# Patient Record
Sex: MALE | Race: WHITE | NOT HISPANIC OR LATINO
[De-identification: names, ages, dates, MRNs, and addresses within clinical notes are randomized per-mention and may not be internally consistent; named-entity substitution may affect disease eponyms.]

---

## 2024-03-23 ENCOUNTER — TRANSCRIPTION ENCOUNTER (OUTPATIENT)
Age: 74
End: 2024-03-23

## 2024-03-24 ENCOUNTER — INPATIENT (INPATIENT)
Facility: HOSPITAL | Age: 74
LOS: 11 days | Discharge: NURSING, REHAB OR LONG-TERM CARE | DRG: 23 | End: 2024-04-05
Attending: PSYCHIATRY & NEUROLOGY | Admitting: NEUROLOGICAL SURGERY
Payer: COMMERCIAL

## 2024-03-24 VITALS
WEIGHT: 147.49 LBS | RESPIRATION RATE: 18 BRPM | HEART RATE: 92 BPM | OXYGEN SATURATION: 98 % | DIASTOLIC BLOOD PRESSURE: 54 MMHG | SYSTOLIC BLOOD PRESSURE: 158 MMHG

## 2024-03-24 DIAGNOSIS — I10 ESSENTIAL (PRIMARY) HYPERTENSION: ICD-10-CM

## 2024-03-24 DIAGNOSIS — E78.5 HYPERLIPIDEMIA, UNSPECIFIED: ICD-10-CM

## 2024-03-24 DIAGNOSIS — I63.9 CEREBRAL INFARCTION, UNSPECIFIED: ICD-10-CM

## 2024-03-24 LAB
ALBUMIN SERPL ELPH-MCNC: 3.8 G/DL — SIGNIFICANT CHANGE UP (ref 3.3–5)
ALBUMIN SERPL ELPH-MCNC: 4.4 G/DL — SIGNIFICANT CHANGE UP (ref 3.3–5)
ALP SERPL-CCNC: 111 U/L — SIGNIFICANT CHANGE UP (ref 40–120)
ALP SERPL-CCNC: 92 U/L — SIGNIFICANT CHANGE UP (ref 40–120)
ALT FLD-CCNC: 10 U/L — SIGNIFICANT CHANGE UP (ref 10–45)
ALT FLD-CCNC: 13 U/L — SIGNIFICANT CHANGE UP (ref 10–45)
ANION GAP SERPL CALC-SCNC: 13 MMOL/L — SIGNIFICANT CHANGE UP (ref 5–17)
ANION GAP SERPL CALC-SCNC: 9 MMOL/L — SIGNIFICANT CHANGE UP (ref 5–17)
APTT BLD: 24.3 SEC — LOW (ref 24.5–35.6)
APTT BLD: 24.9 SEC — SIGNIFICANT CHANGE UP (ref 24.5–35.6)
AST SERPL-CCNC: 17 U/L — SIGNIFICANT CHANGE UP (ref 10–40)
AST SERPL-CCNC: 22 U/L — SIGNIFICANT CHANGE UP (ref 10–40)
BASOPHILS # BLD AUTO: 0.07 K/UL — SIGNIFICANT CHANGE UP (ref 0–0.2)
BASOPHILS NFR BLD AUTO: 1.1 % — SIGNIFICANT CHANGE UP (ref 0–2)
BILIRUB SERPL-MCNC: 0.7 MG/DL — SIGNIFICANT CHANGE UP (ref 0.2–1.2)
BILIRUB SERPL-MCNC: 0.8 MG/DL — SIGNIFICANT CHANGE UP (ref 0.2–1.2)
BUN SERPL-MCNC: 13 MG/DL — SIGNIFICANT CHANGE UP (ref 7–23)
BUN SERPL-MCNC: 9 MG/DL — SIGNIFICANT CHANGE UP (ref 7–23)
CALCIUM SERPL-MCNC: 8.1 MG/DL — LOW (ref 8.4–10.5)
CALCIUM SERPL-MCNC: 9.4 MG/DL — SIGNIFICANT CHANGE UP (ref 8.4–10.5)
CHLORIDE SERPL-SCNC: 105 MMOL/L — SIGNIFICANT CHANGE UP (ref 96–108)
CHLORIDE SERPL-SCNC: 106 MMOL/L — SIGNIFICANT CHANGE UP (ref 96–108)
CO2 SERPL-SCNC: 22 MMOL/L — SIGNIFICANT CHANGE UP (ref 22–31)
CO2 SERPL-SCNC: 22 MMOL/L — SIGNIFICANT CHANGE UP (ref 22–31)
CREAT SERPL-MCNC: 0.65 MG/DL — SIGNIFICANT CHANGE UP (ref 0.5–1.3)
CREAT SERPL-MCNC: 0.79 MG/DL — SIGNIFICANT CHANGE UP (ref 0.5–1.3)
EGFR: 100 ML/MIN/1.73M2 — SIGNIFICANT CHANGE UP
EGFR: 94 ML/MIN/1.73M2 — SIGNIFICANT CHANGE UP
EOSINOPHIL # BLD AUTO: 0.13 K/UL — SIGNIFICANT CHANGE UP (ref 0–0.5)
EOSINOPHIL NFR BLD AUTO: 2.1 % — SIGNIFICANT CHANGE UP (ref 0–6)
GLUCOSE BLDC GLUCOMTR-MCNC: 95 MG/DL — SIGNIFICANT CHANGE UP (ref 70–99)
GLUCOSE SERPL-MCNC: 104 MG/DL — HIGH (ref 70–99)
GLUCOSE SERPL-MCNC: 120 MG/DL — HIGH (ref 70–99)
HCT VFR BLD CALC: 35.9 % — LOW (ref 39–50)
HCT VFR BLD CALC: 41.8 % — SIGNIFICANT CHANGE UP (ref 39–50)
HGB BLD-MCNC: 12.3 G/DL — LOW (ref 13–17)
HGB BLD-MCNC: 14.2 G/DL — SIGNIFICANT CHANGE UP (ref 13–17)
IMM GRANULOCYTES NFR BLD AUTO: 0.3 % — SIGNIFICANT CHANGE UP (ref 0–0.9)
INR BLD: 0.98 — SIGNIFICANT CHANGE UP (ref 0.85–1.18)
INR BLD: 1.01 — SIGNIFICANT CHANGE UP (ref 0.85–1.18)
LYMPHOCYTES # BLD AUTO: 0.7 K/UL — LOW (ref 1–3.3)
LYMPHOCYTES # BLD AUTO: 11.4 % — LOW (ref 13–44)
MAGNESIUM SERPL-MCNC: 1.9 MG/DL — SIGNIFICANT CHANGE UP (ref 1.6–2.6)
MCHC RBC-ENTMCNC: 33.5 PG — SIGNIFICANT CHANGE UP (ref 27–34)
MCHC RBC-ENTMCNC: 34 GM/DL — SIGNIFICANT CHANGE UP (ref 32–36)
MCHC RBC-ENTMCNC: 34 PG — SIGNIFICANT CHANGE UP (ref 27–34)
MCHC RBC-ENTMCNC: 34.3 GM/DL — SIGNIFICANT CHANGE UP (ref 32–36)
MCV RBC AUTO: 98.6 FL — SIGNIFICANT CHANGE UP (ref 80–100)
MCV RBC AUTO: 99.2 FL — SIGNIFICANT CHANGE UP (ref 80–100)
MONOCYTES # BLD AUTO: 0.48 K/UL — SIGNIFICANT CHANGE UP (ref 0–0.9)
MONOCYTES NFR BLD AUTO: 7.8 % — SIGNIFICANT CHANGE UP (ref 2–14)
NEUTROPHILS # BLD AUTO: 4.72 K/UL — SIGNIFICANT CHANGE UP (ref 1.8–7.4)
NEUTROPHILS NFR BLD AUTO: 77.3 % — HIGH (ref 43–77)
NRBC # BLD: 0 /100 WBCS — SIGNIFICANT CHANGE UP (ref 0–0)
NRBC # BLD: 0 /100 WBCS — SIGNIFICANT CHANGE UP (ref 0–0)
PHOSPHATE SERPL-MCNC: 4.1 MG/DL — SIGNIFICANT CHANGE UP (ref 2.5–4.5)
PLATELET # BLD AUTO: 147 K/UL — LOW (ref 150–400)
PLATELET # BLD AUTO: 159 K/UL — SIGNIFICANT CHANGE UP (ref 150–400)
POTASSIUM SERPL-MCNC: 4.1 MMOL/L — SIGNIFICANT CHANGE UP (ref 3.5–5.3)
POTASSIUM SERPL-MCNC: 4.6 MMOL/L — SIGNIFICANT CHANGE UP (ref 3.5–5.3)
POTASSIUM SERPL-SCNC: 4.1 MMOL/L — SIGNIFICANT CHANGE UP (ref 3.5–5.3)
POTASSIUM SERPL-SCNC: 4.6 MMOL/L — SIGNIFICANT CHANGE UP (ref 3.5–5.3)
PROT SERPL-MCNC: 5.6 G/DL — LOW (ref 6–8.3)
PROT SERPL-MCNC: 6.9 G/DL — SIGNIFICANT CHANGE UP (ref 6–8.3)
PROTHROM AB SERPL-ACNC: 11.2 SEC — SIGNIFICANT CHANGE UP (ref 9.5–13)
PROTHROM AB SERPL-ACNC: 11.5 SEC — SIGNIFICANT CHANGE UP (ref 9.5–13)
RBC # BLD: 3.62 M/UL — LOW (ref 4.2–5.8)
RBC # BLD: 4.24 M/UL — SIGNIFICANT CHANGE UP (ref 4.2–5.8)
RBC # FLD: 12.7 % — SIGNIFICANT CHANGE UP (ref 10.3–14.5)
RBC # FLD: 12.7 % — SIGNIFICANT CHANGE UP (ref 10.3–14.5)
SODIUM SERPL-SCNC: 136 MMOL/L — SIGNIFICANT CHANGE UP (ref 135–145)
SODIUM SERPL-SCNC: 141 MMOL/L — SIGNIFICANT CHANGE UP (ref 135–145)
WBC # BLD: 5.8 K/UL — SIGNIFICANT CHANGE UP (ref 3.8–10.5)
WBC # BLD: 6.12 K/UL — SIGNIFICANT CHANGE UP (ref 3.8–10.5)
WBC # FLD AUTO: 5.8 K/UL — SIGNIFICANT CHANGE UP (ref 3.8–10.5)
WBC # FLD AUTO: 6.12 K/UL — SIGNIFICANT CHANGE UP (ref 3.8–10.5)

## 2024-03-24 PROCEDURE — 99291 CRITICAL CARE FIRST HOUR: CPT

## 2024-03-24 PROCEDURE — 70450 CT HEAD/BRAIN W/O DYE: CPT | Mod: 26,MC,59

## 2024-03-24 PROCEDURE — 0042T: CPT | Mod: MC

## 2024-03-24 PROCEDURE — 70496 CT ANGIOGRAPHY HEAD: CPT | Mod: 26,MC

## 2024-03-24 PROCEDURE — 70498 CT ANGIOGRAPHY NECK: CPT | Mod: 26,MC

## 2024-03-24 RX ORDER — POLYETHYLENE GLYCOL 3350 17 G/17G
17 POWDER, FOR SOLUTION ORAL DAILY
Refills: 0 | Status: DISCONTINUED | OUTPATIENT
Start: 2024-03-24 | End: 2024-04-05

## 2024-03-24 RX ORDER — FINASTERIDE 5 MG/1
1 TABLET, FILM COATED ORAL
Refills: 0 | DISCHARGE

## 2024-03-24 RX ORDER — SODIUM CHLORIDE 9 MG/ML
10 INJECTION INTRAMUSCULAR; INTRAVENOUS; SUBCUTANEOUS ONCE
Refills: 0 | Status: COMPLETED | OUTPATIENT
Start: 2024-03-24 | End: 2024-03-24

## 2024-03-24 RX ORDER — HYDROMORPHONE HYDROCHLORIDE 2 MG/ML
0.25 INJECTION INTRAMUSCULAR; INTRAVENOUS; SUBCUTANEOUS ONCE
Refills: 0 | Status: DISCONTINUED | OUTPATIENT
Start: 2024-03-24 | End: 2024-03-24

## 2024-03-24 RX ORDER — TRAMADOL HYDROCHLORIDE 50 MG/1
25 TABLET ORAL EVERY 6 HOURS
Refills: 0 | Status: DISCONTINUED | OUTPATIENT
Start: 2024-03-24 | End: 2024-03-26

## 2024-03-24 RX ORDER — ONDANSETRON 8 MG/1
4 TABLET, FILM COATED ORAL EVERY 8 HOURS
Refills: 0 | Status: DISCONTINUED | OUTPATIENT
Start: 2024-03-24 | End: 2024-04-05

## 2024-03-24 RX ORDER — ACETAMINOPHEN 500 MG
1000 TABLET ORAL ONCE
Refills: 0 | Status: COMPLETED | OUTPATIENT
Start: 2024-03-24 | End: 2024-03-24

## 2024-03-24 RX ORDER — TENECTEPLASE 50 MG
17 KIT INTRAVENOUS ONCE
Refills: 0 | Status: COMPLETED | OUTPATIENT
Start: 2024-03-24 | End: 2024-03-24

## 2024-03-24 RX ORDER — INSULIN LISPRO 100/ML
VIAL (ML) SUBCUTANEOUS
Refills: 0 | Status: DISCONTINUED | OUTPATIENT
Start: 2024-03-24 | End: 2024-03-26

## 2024-03-24 RX ORDER — SODIUM CHLORIDE 9 MG/ML
1000 INJECTION INTRAMUSCULAR; INTRAVENOUS; SUBCUTANEOUS ONCE
Refills: 0 | Status: COMPLETED | OUTPATIENT
Start: 2024-03-24 | End: 2024-03-24

## 2024-03-24 RX ORDER — SODIUM CHLORIDE 9 MG/ML
1000 INJECTION INTRAMUSCULAR; INTRAVENOUS; SUBCUTANEOUS
Refills: 0 | Status: DISCONTINUED | OUTPATIENT
Start: 2024-03-24 | End: 2024-03-25

## 2024-03-24 RX ORDER — TAMSULOSIN HYDROCHLORIDE 0.4 MG/1
0.4 CAPSULE ORAL AT BEDTIME
Refills: 0 | Status: DISCONTINUED | OUTPATIENT
Start: 2024-03-24 | End: 2024-04-05

## 2024-03-24 RX ORDER — CHLORHEXIDINE GLUCONATE 213 G/1000ML
1 SOLUTION TOPICAL DAILY
Refills: 0 | Status: DISCONTINUED | OUTPATIENT
Start: 2024-03-24 | End: 2024-03-25

## 2024-03-24 RX ORDER — TAMSULOSIN HYDROCHLORIDE 0.4 MG/1
1 CAPSULE ORAL
Refills: 0 | DISCHARGE

## 2024-03-24 RX ORDER — ACETAMINOPHEN 500 MG
650 TABLET ORAL EVERY 6 HOURS
Refills: 0 | Status: DISCONTINUED | OUTPATIENT
Start: 2024-03-24 | End: 2024-04-05

## 2024-03-24 RX ORDER — SENNA PLUS 8.6 MG/1
2 TABLET ORAL AT BEDTIME
Refills: 0 | Status: DISCONTINUED | OUTPATIENT
Start: 2024-03-24 | End: 2024-03-31

## 2024-03-24 RX ORDER — FINASTERIDE 5 MG/1
5 TABLET, FILM COATED ORAL DAILY
Refills: 0 | Status: DISCONTINUED | OUTPATIENT
Start: 2024-03-24 | End: 2024-04-05

## 2024-03-24 RX ORDER — ATORVASTATIN CALCIUM 80 MG/1
80 TABLET, FILM COATED ORAL AT BEDTIME
Refills: 0 | Status: DISCONTINUED | OUTPATIENT
Start: 2024-03-24 | End: 2024-03-28

## 2024-03-24 RX ORDER — TRAMADOL HYDROCHLORIDE 50 MG/1
50 TABLET ORAL EVERY 6 HOURS
Refills: 0 | Status: DISCONTINUED | OUTPATIENT
Start: 2024-03-24 | End: 2024-03-26

## 2024-03-24 RX ADMIN — SODIUM CHLORIDE 1000 MILLILITER(S): 9 INJECTION INTRAMUSCULAR; INTRAVENOUS; SUBCUTANEOUS at 20:59

## 2024-03-24 RX ADMIN — ONDANSETRON 4 MILLIGRAM(S): 8 TABLET, FILM COATED ORAL at 22:21

## 2024-03-24 RX ADMIN — TENECTEPLASE 2448 MILLIGRAM(S): KIT at 08:46

## 2024-03-24 RX ADMIN — Medication 1000 MILLIGRAM(S): at 14:16

## 2024-03-24 RX ADMIN — HYDROMORPHONE HYDROCHLORIDE 0.25 MILLIGRAM(S): 2 INJECTION INTRAMUSCULAR; INTRAVENOUS; SUBCUTANEOUS at 16:00

## 2024-03-24 RX ADMIN — HYDROMORPHONE HYDROCHLORIDE 0.25 MILLIGRAM(S): 2 INJECTION INTRAMUSCULAR; INTRAVENOUS; SUBCUTANEOUS at 16:59

## 2024-03-24 RX ADMIN — ATORVASTATIN CALCIUM 80 MILLIGRAM(S): 80 TABLET, FILM COATED ORAL at 21:58

## 2024-03-24 RX ADMIN — TRAMADOL HYDROCHLORIDE 50 MILLIGRAM(S): 50 TABLET ORAL at 21:03

## 2024-03-24 RX ADMIN — HYDROMORPHONE HYDROCHLORIDE 0.25 MILLIGRAM(S): 2 INJECTION INTRAMUSCULAR; INTRAVENOUS; SUBCUTANEOUS at 17:30

## 2024-03-24 RX ADMIN — SENNA PLUS 2 TABLET(S): 8.6 TABLET ORAL at 21:58

## 2024-03-24 RX ADMIN — SODIUM CHLORIDE 10 MILLILITER(S): 9 INJECTION INTRAMUSCULAR; INTRAVENOUS; SUBCUTANEOUS at 08:45

## 2024-03-24 RX ADMIN — SODIUM CHLORIDE 10 MILLILITER(S): 9 INJECTION INTRAMUSCULAR; INTRAVENOUS; SUBCUTANEOUS at 08:46

## 2024-03-24 RX ADMIN — TAMSULOSIN HYDROCHLORIDE 0.4 MILLIGRAM(S): 0.4 CAPSULE ORAL at 21:58

## 2024-03-24 RX ADMIN — Medication 400 MILLIGRAM(S): at 13:28

## 2024-03-24 RX ADMIN — TRAMADOL HYDROCHLORIDE 50 MILLIGRAM(S): 50 TABLET ORAL at 20:59

## 2024-03-24 RX ADMIN — HYDROMORPHONE HYDROCHLORIDE 0.25 MILLIGRAM(S): 2 INJECTION INTRAMUSCULAR; INTRAVENOUS; SUBCUTANEOUS at 15:10

## 2024-03-24 NOTE — PROGRESS NOTE ADULT - SUBJECTIVE AND OBJECTIVE BOX
NEUROSURGERY POST OP NOTE:    POD# 0 S/P cerebral angiogram with thrombectomy of right M1/M2, TICI 0 to 2B    S: Patient reports headache. Denies groin pain, numbness, tingling, nausea, vomiting, chest pain, palpitations, shortness of breath, vision changes.     T(C): 36.5 (03-24-24 @ 14:08), Max: 36.5 (03-24-24 @ 14:08)  HR: 73 (03-24-24 @ 14:00) (71 - 94)  BP: 120/73 (03-24-24 @ 14:00) (115/74 - 158/54)  RR: 22 (03-24-24 @ 14:00) (11 - 22)  SpO2: 100% (03-24-24 @ 14:00) (98% - 100%)    acetaminophen     Tablet .. 650 milliGRAM(s) Oral every 6 hours PRN  atorvastatin 80 milliGRAM(s) Oral at bedtime  chlorhexidine 2% Cloths 1 Application(s) Topical daily  polyethylene glycol 3350 17 Gram(s) Oral daily  senna 2 Tablet(s) Oral at bedtime  sodium chloride 0.9%. 1000 milliLiter(s) IV Continuous <Continuous>    RADIOLOGY:     Exam:  Constitutional: 74 y/o maleawake, alert in no acute distress.  Eyes: Sclera anicteric, conjunctiva noninjected.   ENMT: Oropharyngeal mucosa moist, pink. Tongue midline.    Respiratory: Chest rise symmetric, unlabored breathing. + NC  Cardiovascular: Regular rate and rhythm.   Gastrointestinal: Soft, nontender, nondistended.   Vascular: Extremities warm, no ulcers, no discoloration of skin. DP pulses 1+ b/l (easily to doppler), PT 2+ b/l   Neurological: AA&O x 2. L facial droop, mild left neglect, otherwise CN II-XII grossly intact. SANTANA x 4, RUE 5/5, RLE deferred d/t groin puncture. LUE/LLE 4/5. Sensation intact to light touch throughout. No pronator drift, no dysmetria.  Skin: Warm, dry, no erythema.  Incision: Right groin puncture site c/d/i with gauze, safeguard deflated.     Assessment:   74 y/o male with h/o HTN, BPH, HLD, mitral valve repair presents to ED with left sided weakness, L facial droop, and L gaze preference, s/p TNK (3/24/24). Stroke code imaging demonstrates right M1 occlusion, now s/p thrombectomy R M1/2, TICI 0 to 2B (3/24/24).     NEURO  - Neuro/vitals q 1   - CTH 3/24/24 9 AM   - Bedrest x 8 hrs   - Pain control   - Stroke/neuro following, stroke core measures ordered     CARDIO  - -160   - Echo with bubble   - HTN: hold home Lisinopril 5 mg     PULM  - RA, encourage IS    GI   - ADAT after 3 PM   - Bowel regimen     RENAL   - NS @ 75   - Voiding     ENDO   - ISS  - Follow up A1c, TSH, Lipid panel   - HLD: Increase home losartan to 80 mg QHS i/s/o stroke     HEME   - SCDs, hold SQL in setting of TNK    ID   - afebrile     DISPO  - ICU status, full code   - Hold PT/OT     D/W Dr. Rubio and Dr. Cast

## 2024-03-24 NOTE — BRIEF OPERATIVE NOTE - NSICDXBRIEFPREOP_GEN_ALL_CORE_FT
PRE-OP DIAGNOSIS:  CVA (cerebrovascular accident) 24-Mar-2024 12:40:12 Right MCA Occlusion TICI 0 Sonny Suresh

## 2024-03-24 NOTE — ED ADULT TRIAGE NOTE - CHIEF COMPLAINT QUOTE
Pt to ED notification for slurred speech and left sided weakness. Notification called and stroke code activated. Last known well 7:45.

## 2024-03-24 NOTE — H&P ADULT - NSICDXPASTMEDICALHX_GEN_ALL_CORE_FT
PAST MEDICAL HISTORY:  CVA (cerebrovascular accident)     History of BPH     Hyperlipidemia     Hypertension

## 2024-03-24 NOTE — H&P ADULT - NSHPPHYSICALEXAM_GEN_ALL_CORE
Exam  General: No acute distress, relatively compfortable and cooperative with exam  HEENT: NC/AT, PERR, neck supple, no JVD  Neuro:  -Mental status: Awake, alert, oriented to person, age, and month. Speech is fluent with intact naming,  Follows commands. Attention/concentration intact.   -Cranial nerves:   II: No blink to threat of left visual field  III, IV, VI: Right gaze preference unable to overcome. Pupils equally round and reactive to light  VII: Left facial droop  Motor: Normal bulk and tone. Right side 5/5. Left arm 0/5, Left leg able to lift against gravity but immediately falls, 3/5  Sensation: Left hemineglect and sensation loss  Coordination: No dysmetria on finger-to-nose of right side   CVS: S1-S@, No RRR  Lungs: Grossly CTA  Abd: Soft, ND  Ext: No C/C/E,   Pulses: B/L DP 2+  Skin: No rashes, no lesions noted

## 2024-03-24 NOTE — ED PROVIDER NOTE - OBJECTIVE STATEMENT
73 year old M w h/o HTN presenting with L sided paralysis, R gaze preference, dysarthria, last known well around 7:30 AM. Pt went for a run this AM, came home and felt off in bathroom, leaned towards glass door around 7:30 AM. Slightly hit his head on glass door of shower. Mild headache. Not on AC. ROS as above.

## 2024-03-24 NOTE — H&P ADULT - NSHPLABSRESULTS_GEN_ALL_CORE
14.2   6.12  )-----------( 159      ( 24 Mar 2024 08:22 )             41.8   PT/INR - ( 24 Mar 2024 08:22 )   PT: 11.2 sec;   INR: 0.98          PTT - ( 24 Mar 2024 08:22 )  PTT:24.3 sec  03-24-24 @ 08:22    141  |  106  |  13             --------------------------< 120<H>     4.6  |  22  | 0.79                      Calcium: 9.4 -  AST: 22    ALT: 13  AlkPhos: 111  Protein: 6.9  Albumin: 4.4  TBili: 0.7  < from: CT Angio Neck Stroke Protocol w/ IV Cont (03.24.24 @ 08:55) >    IMPRESSION:    CT PERFUSION: Small core infarct, large ischemic penumbra in the right   MCA territory.    CTA COW: Abrupt right M1 occlusion with M2 reconstitution.    CTA NECK: Patent, ECAs, ICAs, no  hemodynamically significant stenosis at    ICA origins by NASCET criteria.  Bilateral vertebral arteries are patent without flow limiting stenosis.    --- End of Report ---    ESME RODRIGUEZ MD; Attending Radiologist  This document has been electronically signed. Mar 24 2024  9:09AM    < end of copied text >

## 2024-03-24 NOTE — H&P ADULT - PROBLEM SELECTOR PLAN 1
Mechanical Thrombectomy using Penumbra aspiration and stent retriever.  Admit to NSICU  Neuro, Vitals, puncture site and pulses checks  SBP less than 160  Bed rest for no less than 8 hrs  Repeat non contrast HCT 24 hrs post TNK, or sooner if MS change  HOB Flat x 4 hrs, slow Up to 15 degrees if needed  NPO  Stroke core measures

## 2024-03-24 NOTE — ED ADULT NURSE NOTE - NSFALLRISKINTERV_ED_ALL_ED
Assistance OOB with selected safe patient handling equipment if applicable/Assistance with ambulation/Communicate fall risk and risk factors to all staff, patient, and family/Monitor gait and stability/Provide visual cue: yellow wristband, yellow gown, etc/Reinforce activity limits and safety measures with patient and family/Call bell, personal items and telephone in reach/Instruct patient to call for assistance before getting out of bed/chair/stretcher/Non-slip footwear applied when patient is off stretcher/[place] to call system/Physically safe environment - no spills, clutter or unnecessary equipment/Purposeful Proactive Rounding/Room/bathroom lighting operational, light cord in reach

## 2024-03-24 NOTE — ED PROVIDER NOTE - PHYSICAL EXAMINATION
general: Well appearing, in no acute distress  HEENT: Normocephalic, atraumatic, extraocular movements intact  CV: Regular rate  Pulm: No respiratory distress, no tachypnea  Abd: Flat, no gross distension  Ext: warm and well perfused  Skin: No gross rashes or lesions  Neuro: Alert and oriented,  left-sided paralysis, left facial droop, right gaze press preference, dysarthria

## 2024-03-24 NOTE — PROGRESS NOTE ADULT - SUBJECTIVE AND OBJECTIVE BOX
NSCU ATTENDING -- ADDITIONAL PROGRESS NOTE    Nighttime rounds were performed    74 y/o M with HTN, BPH, HLD, coming in with left sided weakness. Pt states that he woke up at 5 am and went for a run. He came back and went to shower. He said he slipped and hit his head on the shower door. Wife states that he heard a thud from the shower around 7:45AM. She went in and noticed his left side was weak and his head was against the shower door. He had not fallen to the ground. She called her daughter who came by, and they also called EMS. Pt states that he slipped and fell, does not think he has any actual weakness. He wonders how his dog is doing. 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 NIHSS 17. Pt states he has a headache, 9/10 but says it isn't that bad and does not need medication for the headache. Case discussed with Dr. Narinder Sherwood who discussed with Dr. Schreiber prior to urgent intervention. Risk and benefits of tenecteplase were discussed with patient/family member including risk of symptomatic ICH/death. Decision was made that benefits outweighed risks and tenecteplase was administered. Thrombectomy was discussed between Neurology and Neuro IR attendings and decision was made to proceed with thrombectomy. Tenecteplase given at 8:46AM.  Pt was seen about 30 minutes after TNK was given, NIHSS improved to 15. Headache similar to how it was prior to tenecteplase administration.  (24 Mar 2024 12:17)      ICU Vital Signs Last 24 Hrs  T(C): 37.7 (24 Mar 2024 18:11), Max: 37.7 (24 Mar 2024 18:11)  T(F): 99.9 (24 Mar 2024 18:11), Max: 99.9 (24 Mar 2024 18:11)  HR: 64 (24 Mar 2024 18:00) (64 - 94)  BP: 116/70 (24 Mar 2024 18:00) (101/63 - 158/54)  BP(mean): 88 (24 Mar 2024 18:00) (76 - 90)  RR: 18 (24 Mar 2024 18:00) (11 - 22)  SpO2: 95% (24 Mar 2024 18:00) (95% - 100%)      PHYSICAL EXAMINATION  NEUROLOGIC EXAMINATION:   Patient is lethargic, easily arousable, oriented x2, dysarthric, pupils 2-3mm equal and briskly reactive to light, no gaze preference  Power:  RUE, RLE 5/5, LUE, LLE 4/5  GENERAL: Calm  EENT: Anicteric  CARDIOVASCULAR: S1/S2, RRR  PULMONARY: Clear to auscultation bilaterally  ABDOMEN: Soft, nontender with normoactive bowel sounds  EXTREMITIES: No edema  SKIN: No rash      MEDICATIONS:  acetaminophen     Tablet .. 650 milliGRAM(s) Oral every 6 hours PRN  atorvastatin 80 milliGRAM(s) Oral at bedtime  chlorhexidine 2% Cloths 1 Application(s) Topical daily  finasteride 5 milliGRAM(s) Oral daily  insulin lispro (ADMELOG) corrective regimen sliding scale   SubCutaneous Before meals and at bedtime  polyethylene glycol 3350 17 Gram(s) Oral daily  senna 2 Tablet(s) Oral at bedtime  sodium chloride 0.9%. 1000 milliLiter(s) (75 mL/Hr) IV Continuous <Continuous>  tamsulosin 0.4 milliGRAM(s) Oral at bedtime  traMADol 25 milliGRAM(s) Oral every 6 hours PRN  traMADol 50 milliGRAM(s) Oral every 6 hours PRN      LABS:                    12.3   5.80  )-----------( 147      ( 24 Mar 2024 16:28 )             35.9     03-24    136  |  105  |  9   ----------------------------<  104<H>  4.1   |  22  |  0.65    Ca    8.1<L>      24 Mar 2024 16:28  Phos  4.1     03-24  Mg     1.9     03-24    TPro  5.6<L>  /  Alb  3.8  /  TBili  0.8  /  DBili  x   /  AST  17  /  ALT  10  /  AlkPhos  92  03-24    LIVER FUNCTIONS - ( 24 Mar 2024 16:28 )  Alb: 3.8 g/dL / Pro: 5.6 g/dL / ALK PHOS: 92 U/L / ALT: 10 U/L / AST: 17 U/L / GGT: x               ASSESSMENT:  74 y/o M with:  Acute CVA R M1 occlusion, s/p TNK, s/p thrombectomy (03/24/2024, Dr. Sage)  Hypertension dyslipidemia  BPH    PLAN:   Neurochecks Q1h, PRN pain meds with acetaminophen  Hold antiplatelets, anticoagulation 24 hours post TNK  CT head 24 hours post TNK  Stroke core measures  Stroke neurology consult  Room air, incentive spirometry  SBP goal 120-160mm Hg, echo with bubble study  Blood sugar goals 140-180 mg/dL, continue insulin sliding scale  Bowel regimen  LBM:  Advance as tolerated after 6 hours post-TNK  IVF while NPO. Continue tamsulosin, finasteride  Hb stable  VTE Prophylaxis: SCDs, no DVT chemoprophylaxis for now as patient is high risk for bleed (post-TNK)  Afebrile, no leukocytosis    Additional 45 minutes of critical care time.     NSCU ATTENDING -- ADDITIONAL PROGRESS NOTE    Nighttime rounds were performed    74 y/o M with HTN, BPH, HLD, coming in with left sided weakness. Pt states that he woke up at 5 am and went for a run. He came back and went to shower. He said he slipped and hit his head on the shower door. Wife states that he heard a thud from the shower around 7:45AM. She went in and noticed his left side was weak and his head was against the shower door. He had not fallen to the ground. She called her daughter who came by, and they also called EMS. Pt states that he slipped and fell, does not think he has any actual weakness. He wonders how his dog is doing. 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 NIHSS 17. Pt states he has a headache, 9/10 but says it isn't that bad and does not need medication for the headache. Case discussed with Dr. Narinder Sherwood who discussed with Dr. Schreiber prior to urgent intervention. Risk and benefits of tenecteplase were discussed with patient/family member including risk of symptomatic ICH/death. Decision was made that benefits outweighed risks and tenecteplase was administered. Thrombectomy was discussed between Neurology and Neuro IR attendings and decision was made to proceed with thrombectomy. Tenecteplase given at 8:46AM.  Pt was seen about 30 minutes after TNK was given, NIHSS improved to 15. Headache similar to how it was prior to tenecteplase administration.  (24 Mar 2024 12:17)      ICU Vital Signs Last 24 Hrs  T(C): 37.7 (24 Mar 2024 18:11), Max: 37.7 (24 Mar 2024 18:11)  T(F): 99.9 (24 Mar 2024 18:11), Max: 99.9 (24 Mar 2024 18:11)  HR: 64 (24 Mar 2024 18:00) (64 - 94)  BP: 116/70 (24 Mar 2024 18:00) (101/63 - 158/54)  BP(mean): 88 (24 Mar 2024 18:00) (76 - 90)  RR: 18 (24 Mar 2024 18:00) (11 - 22)  SpO2: 95% (24 Mar 2024 18:00) (95% - 100%)      PHYSICAL EXAMINATION  NEUROLOGIC EXAMINATION:   Patient is lethargic, easily arousable, oriented x2, dysarthric, pupils 2-3mm equal and briskly reactive to light, no gaze preference  Power:  RUE, RLE 5/5, LUE, LLE 4/5  GENERAL: Calm  EENT: Anicteric  CARDIOVASCULAR: S1/S2, RRR  PULMONARY: Clear to auscultation bilaterally  ABDOMEN: Soft, nontender with normoactive bowel sounds  EXTREMITIES: No edema  SKIN: No rash      MEDICATIONS:  acetaminophen     Tablet .. 650 milliGRAM(s) Oral every 6 hours PRN  atorvastatin 80 milliGRAM(s) Oral at bedtime  chlorhexidine 2% Cloths 1 Application(s) Topical daily  finasteride 5 milliGRAM(s) Oral daily  insulin lispro (ADMELOG) corrective regimen sliding scale   SubCutaneous Before meals and at bedtime  polyethylene glycol 3350 17 Gram(s) Oral daily  senna 2 Tablet(s) Oral at bedtime  sodium chloride 0.9%. 1000 milliLiter(s) (75 mL/Hr) IV Continuous <Continuous>  tamsulosin 0.4 milliGRAM(s) Oral at bedtime  traMADol 25 milliGRAM(s) Oral every 6 hours PRN  traMADol 50 milliGRAM(s) Oral every 6 hours PRN      LABS:                    12.3   5.80  )-----------( 147      ( 24 Mar 2024 16:28 )             35.9     03-24    136  |  105  |  9   ----------------------------<  104<H>  4.1   |  22  |  0.65    Ca    8.1<L>      24 Mar 2024 16:28  Phos  4.1     03-24  Mg     1.9     03-24    TPro  5.6<L>  /  Alb  3.8  /  TBili  0.8  /  DBili  x   /  AST  17  /  ALT  10  /  AlkPhos  92  03-24    LIVER FUNCTIONS - ( 24 Mar 2024 16:28 )  Alb: 3.8 g/dL / Pro: 5.6 g/dL / ALK PHOS: 92 U/L / ALT: 10 U/L / AST: 17 U/L / GGT: x               ASSESSMENT:  74 y/o M with:  Acute CVA R M1 occlusion, s/p TNK, s/p thrombectomy (03/24/2024, Dr. Sage)  Hypertension dyslipidemia  BPH    PLAN:   Neurochecks Q1h, PRN pain meds with acetaminophen  Hold antiplatelets, anticoagulation 24 hours post TNK  CT head 24 hours post TNK  Stroke core measures  Stroke neurology consult  Room air, incentive spirometry  SBP goal 120-160mm Hg, TTE with bubble study  Blood sugar goals 140-180 mg/dL, continue insulin sliding scale  Bowel regimen  LBM: awaiting  Advance as tolerated after 6 hours post-TNK  IVF while NPO. Continue tamsulosin, finasteride  Hb stable  VTE Prophylaxis: SCDs, no DVT chemoprophylaxis for now as patient is high risk for bleed (post-TNK)  Afebrile, no leukocytosis    Additional 45 minutes of critical care time.

## 2024-03-24 NOTE — PROGRESS NOTE ADULT - SUBJECTIVE AND OBJECTIVE BOX
=================================  NEUROCRITICAL CARE ATTENDING NOTE  =================================    CARO CHAVARRIA   MRN-[id number]  Summary:  73y/M with HTN, BPH, HLD, coming in with left sided weakness. Pt states that he woke up at 5 am and went for a run. He came back and went to shower. He said he slipped and hit his head on the shower door. Wife states that he heard a thud from the shower around 7:45AM. She went in and noticed his left side was weak and his head was against the shower door. He had not fallen to the ground. She called her daughter who came by, and they also called EMS. Pt states that he slipped and fell, does not think he has any actual weakness. He wonders how his dog is doing. 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 NIHSS 17. Pt states he has a headache, 9/10 but says it isn't that bad and does not need medication for the headache. Case discussed with Dr. Narinder Sherwood who discussed with Dr. Schreiber prior to urgent intervention. Risk and benefits of tenecteplase were discussed with patient/family member including risk of symptomatic ICH/death. Decision was made that benefits outweighed risks and tenecteplase was administered. Thrombectomy was discussed between Neurology and Neuro IR Attendings and decision was made to proceed with thrombectomy. Tenecteplase given at 8:46AM.  Pt was seen about 30 minutes after TNK was given, NIHSS improved to 15. Headache similar to how it was prior to tenecteplase administration.  (24 Mar 2024 12:17)    COURSE IN THE HOSPITAL:  03/24 admitted to [facility], s/p TNK at ~9 a.m., s/p thrombectomy 6 passes, TICI 0 to 2B    Past Medical History: Hypertension History of BPH CVA (cerebrovascular accident) Hyperlipidemia   Allergies:  Allergy Status Unknown  Home meds:   ·	atorvastatin 40 mg oral tablet: 1 tab(s) orally once a day  ·	finasteride 5 mg oral tablet: 1 tab(s) orally once a day  ·	Flomax 0.4 mg oral capsule: 1 cap(s) orally once a day  ·	lisinopril 5 mg oral tablet: 1 tab(s) orally    PHYSICAL EXAMINATION  T(C): 36.5 (03-24 @ 14:08), Max: 36.5 (03-24 @ 14:08) HR: 81 (03-24 @ 14:45) (71 - 94) BP: 118/74 (03-24 @ 14:45) (115/74 - 158/54) RR: 16 (03-24 @ 14:45) (11 - 22) SpO2: 99% (03-24 @ 14:45) (98% - 100%)  NEUROLOGIC EXAMINATION:  Patient is lethargic, easily rousable, oriented x2, dysarthric, pupils 2-3mm equal and briskly reactive to light, no gaze preference, moving R UE/LE with good strength L UE/LE 4/5  GENERAL: not intubated, not in cardiorespiratory distress  EENT:  anicteric  CARDIOVASCULAR: (+) S1 S2, normal rate and regular rhythm  PULMONARY: clear to auscultation bilaterally  ABDOMEN: soft, nontender with normoactive bowel sounds  EXTREMITIES: no edema  SKIN: no rash    LABS:  CAPILLARY BLOOD GLUCOSE 106 (24 Mar 2024 08:48)               14.2   6.12  )-----------( 159      ( 24 Mar 2024 08:22 )             41.8     141  |  106  |  13  ----------------------------<  120<H>  4.6   |  22  |  0.79    Ca    9.4      24 Mar 2024 08:22    TPro  6.9  /  Alb  4.4  /  TBili  0.7  /  DBili  x   /  AST  22  /  ALT  13  /  AlkPhos  111  03-24    Bacteriology:  CSF studies:  EEG:  Neuroimaging:  Other imaging:    MEDICATIONS: 03-24    ·	polyethylene glycol 3350 17 Oral daily  ·	senna 2 Oral at bedtime  ·	atorvastatin 80 Oral at bedtime  ·	finasteride 5 Oral daily  ·	tamsulosin 0.4 Oral at bedtime  ·	acetaminophen     Tablet .. 650 Oral every 6 hours PRN    IV FLUIDS:  DRIPS:  DIET:  Lines:  Drains:    Wounds:    CODE STATUS:  Full Code                       GOALS OF CARE:  aggressive                      DISPOSITION:  ICU

## 2024-03-24 NOTE — PROGRESS NOTE ADULT - ASSESSMENT
73y/M with  acute CVA R M1 occlusion, s/p TNK, s/p thrombectomy (03/24/2024, Dr. Sage)  Hypertension dyslipidemia  h/o BPH    PLAN:   NEURO: neurochecks q1h, PRN pain meds with acetaminophen  stroke core measures  stroke team consulted  CT at 9 a.m. tomorrow  groin checks  REHAB:  physical therapy evaluation and management    EARLY MOB:  bedrest    PULM:  Room air, incentive spirometry  CARDIO:  SBP goal 120-160mm Hg, echo with bubble study  ENDO:  Blood sugar goals 140-180 mg/dL, continue insulin sliding scale  GI:  bowel regimen  DIET: advance as tolerated after 6 hours post-TNK  RENAL:  IVF while NPO, continue tamsulosin, finasteride  HEM/ONC: Hb stable  VTE Prophylaxis: SCDs, no DVT chemoprophylaxis for now as patient is high risk for bleed (post-TNK)  ID: afebrile, no leukocytosis  Social: daughter / son / wife updated, at bedside    Active issues:  What's keeping patient in the ICU? close neurochecks, groin checks  What is this patient's dispo plan? stepdown to stroke service once neuro stable    ATTENDING ATTESTATION:  I was physically present for the key portions of the evaluation and management (E/M) service provided.  I agree with the above history, physical and plan, which I have reviewed and edited where appropriate.    Patient at high risk for neurological deterioration or death due to:  ICU delirium, aspiration PNA, DVT / PE.  Critical care time:  I have personally provided 60 minutes of critical care time, excluding time spent on separate procedures.      Plan discussed with RN, house staff.

## 2024-03-24 NOTE — ED ADULT NURSE NOTE - OBJECTIVE STATEMENT
73 y.o. male BIBA stroke notification via EMS [id number]. Pt woke up with normal routine at approx 0530 and went for a 10 mile run. Patient went into shower around 0735 and at 0745 wife heard a loud noise in the shower and found him on the ground leaning against the shower door with slurred speech and L side deficits. On arrival patient brought directly to CT scan. arrived on 15L O2 via NRB per EMS "That is our protocol" patient not hypoxic- mentating well O2 sat 100% on RA (o2 discontinued on arrival). c/o headache per wife "forehead was red" possible head strike when fell. Denies CP, SOB, pain other than headache. patient a/ox4, speech slurred. JAIMEE. see stroke flowsheet for full NIHSS. Wife and daughter at bedside updated on plan of care. 73 y.o. male BIBA stroke notification via EMS [id number]. Pt woke up with normal routine at approx 0530 and went for a 10 mile run. Patient went into shower around 0735 and at 0745 wife heard a loud noise in the shower and found him on the ground leaning against the shower door with slurred speech and L side deficits. On arrival patient brought directly to CT scan. arrived on 15L O2 via NRB per EMS "That is our protocol" patient not hypoxic- mentating well O2 sat 100% on RA (o2 discontinued on arrival). c/o headache per wife "forehead was red" possible head strike when fell. Denies CP, SOB, pain other than headache. patient a/ox4, speech slurred, L side deficits, R gaze preference, mild dysarthria present on arrival. JAIMEE. see stroke flowsheet for full NIHSS. Wife and daughter at bedside updated on plan of care.

## 2024-03-24 NOTE — ED PROVIDER NOTE - CLINICAL SUMMARY MEDICAL DECISION MAKING FREE TEXT BOX
73-year-old male presenting with left-sided paralysis, left facial droop, right gaze preference, dysarthria, NIH stroke scale of 17, stroke code activated, CT head negative for intracranial bleed, received TN K by stroke team, will admit to neuro ICU for thrombectomy.

## 2024-03-24 NOTE — BRIEF OPERATIVE NOTE - NSICDXBRIEFPOSTOP_GEN_ALL_CORE_FT
POST-OP DIAGNOSIS:  Cerebrovascular accident (CVA) 24-Mar-2024 12:41:07 Right MCA Mostly recanalized TICI 2B Sonny Suresh

## 2024-03-24 NOTE — ED PROVIDER NOTE - NS ED ROS FT
Fever greater than (need to indicate Fahrenheit or Celsius)/Increased irritability or sluggishness/Unable to urinate/Pain not relieved by Medications/Inability to tolerate liquids or foods/Nausea and vomiting that does not stop/Bleeding that does not stop Constitutional: No fever or chills  Eyes: No discharge or drainage  Ears, Nose, Mouth, Throat: No nasal discharge, no sore throat  Cardiovascular: No chest pain, no palpitations  Respiratory: No shortness of breath, no cough  Gastrointestinal: No nausea or vomiting, no abdominal pain, no diarrhea or constipation  Musculoskeletal: No joint pain, no swelling  Skin: No rashes or lesions  Neurological: No numbness, + weakness, no tingling, + headache

## 2024-03-24 NOTE — PRE-ANESTHESIA EVALUATION ADULT - NSANTHADDINFOFT_GEN_ALL_CORE
ED Provider Note [Charted Location: [facility] ED] [Authored: 24-Mar-2024 08:57]- for Visit: [id number], Complete, Entered, Signed in Full, Available to Patient    HISTORY OF PRESENT ILLNESS:    International Travel:  International Travel within 21 days? No.(1)     Child Abuse Assessment (patients less than 13 yrs):  Chief Complaint: slurred speech.    · Chief Complaint: The patient is a 73y Male complaining of slurred speech.  · HPI Objective Statement: 73 year old M w h/o HTN presenting with L sided paralysis, R gaze preference, dysarthria, last known well around 7:30 AM. Pt went for a run this AM, came home and felt off in bathroom, leaned towards glass door around 7:30 AM. Slightly hit his head on glass door of shower. Mild headache. Not on AC. ROS as above.    HIV:    HIV Test Questions:  · In accordance with [place] State law, we offer every patient who comes to our ED an HIV test. Would you like to be tested today?	Opt out    PAST MEDICAL/SURGICAL/FAMILY/SOCIAL HISTORY:    Tobacco Usage:  · Tobacco Usage	Unknown if ever smoked    ALLERGIES AND HOME MEDICATIONS:   Allergies:        Allergies:  	Allergy Status Unknown:     Home Medications:   * Outpatient Medication Status not yet specified  REVIEW OF SYSTEMS:    Review of Systems:  · Review of Systems: Constitutional: No fever or chills  	Eyes: No discharge or drainage  	Ears, Nose, Mouth, Throat: No nasal discharge, no sore throat  	Cardiovascular: No chest pain, no palpitations  	Respiratory: No shortness of breath, no cough  	Gastrointestinal: No nausea or vomiting, no abdominal pain, no diarrhea or constipation  	Musculoskeletal: No joint pain, no swelling  	Skin: No rashes or lesions  Neurological: No numbness, + weakness, no tingling, + headache    PHYSICAL EXAM:   · Physical Examination: general: Well appearing, in no acute distress  	HEENT: Normocephalic, atraumatic, extraocular movements intact  	CV: Regular rate  	Pulm: No respiratory distress, no tachypnea  	Abd: Flat, no gross distension  	Ext: warm and well perfused  	Skin: No gross rashes or lesions  Neuro: Alert and oriented,  left-sided paralysis, left facial droop, right gaze press preference, dysarthria    CURRENT ORDERS/:   · 	Diet, NPO (ED use only), 24-Mar-2024, Active, Standard  · 	Activity - Bedrest, 24-Mar-2024, Active, Standard  · 	Aspiration Precautions,   Time/Priority:  Routine, 24-Mar-2024, Active, Standard  · 	Blood Glucose Point Of Care Testing,   Frequency:  once, 24-Mar-2024, Active, Standard  · 	Cardiac Monitor Bedside,   Time/Priority:  STAT, 24-Mar-2024, Active, Standard  · 	Fall Risk Protocol,   Time/Priority:  STAT  	  Additional Instructions:  Follow fall risk protocol, 24-Mar-2024, Active, Standard  · 	IV Insert,   Time/Priority:  STAT  	  Additional Instructions:  Peripheral Line: 1, 24-Mar-2024, Active, Standard  · 	National Institutes of Health Stroke Scale Score,   Time/Priority:  Routine  	  Additional Instructions:  Type score here ___, 24-Mar-2024, Active, Standard  · 	Pulse Oximetry,   Frequency: <Continuous>  	  Additional Instructions:  Notify Provider if oxygen saturation is LESS THAN 92%, 24-Mar-2024, Active, Standard  · 	Seizure Precautions,   Time/Priority:  Routine, 24-Mar-2024, Active, Standard  · 	Vital Signs,   Frequency:  every 1 hour, 24-Mar-2024, Active, Standard  · 	Activity - Bedrest Strict,   Additional Instructions:  Strict bed rest for 12 hours post thrombolytic bolus, 24-Mar-2024, Active, Standard  · 	Assess Neurological Status,   Type of Neuro Check:  Stroke  	  Frequency:  See Frequency Below  	  Additional Instructions:  A full NIHSS, pupillary exam, and atypical stroke symptoms (if applicable) should be documented every 15 minutes for two hours, then every 30 minutes for 6 hours and then every hour for the next 16 hours post - thrombolytic administration., 24-Mar-2024, Active, Standard  · 	Assess For,   Additional Instructions:  Hematoma formation when using automatic blood pressure monitoring and pneumatic compression stockings, 24-Mar-2024, Active, Standard  · 	Cardiac Monitor INCLUDING Off Unit Tests,   Time/Priority:  STAT  	  Additional Instructions:  for the first 24 hours post - thrombolytic administration, 24-Mar-2024, Active, Standard  · 	NO Blood Draw,   Additional Instructions:  No blood draw for 2 hours post treatment.  Acceptable with adequate site pressure (may be performed earlier if clinically indicated), 24-Mar-2024, Active, Standard  · 	NO Nasogastric Tube,   Additional Instructions:  No Nasogastric Tube for 24 hours, 24-Mar-2024, Active, Standard  · 	NO Urethral Catheter,   Additional Instructions:  Do not insert indwelling urinary catheter for 2 hours post-thrombolytic infusion, unless clinically indicated, 24-Mar-2024, Active, Standard  · 	Notify Provider For,   Additional Instructions:  Systolic blood pressure GREATER THAN 180mmHg: Diastolic Blood Pressure GREATER THAN 105 mmHg   	Heart Rate GREATER THAN 10, Heart Rate LESS THAN 55, and Respiratory Rate GREATER THAN 25   	Neurological decline or if patient complains headache,   	Bleeding from access sites and hematoma formation, 24-Mar-2024, Active, Standard  · 	Provider to RN,     No antiplatelet or anticoagulant medications for 24 hours AFTER thrombolytic administration, 24-Mar-2024, Active, Standard  · 	Vital Signs,   Frequency:  See Frequency Below  	  Additional Instructions:  Measure blood pressure, heart rate, respiratory rate and pulse oximetry every 15 minutes for two hours, then every 30 minutes for 6 hours and then every hour for the next 16 hours post - thrombolytic administration, 24-Mar-2024, Active, Standard    RESULTS:    Wet Read:  There are no Wet Read(s) to document.    DISPOSITION:    Care Plan - Instructions:  Principal Discharge DX:	Stroke.     Impression:  1.     Principal Discharge Dx Stroke.     Medical Decision Making:  · The following orders were submitted:	Imaging Studies  · Clinical Summary  (MDM): Summarize the clinical encounter	73-year-old male presenting with left-sided paralysis, left facial droop, right gaze preference, dysarthria, NIH stroke scale of 17, stroke code activated, CT head negative for intracranial bleed, received TN K by stroke team, will admit to neuro ICU for thrombectomy.     Disposition:  Disposition: ADMIT.     Division: [facility].     Admitting Service: NEURO.     Bed/Unit Type: ICU.     Palliative Care: NONE.     Isolation: None.     Special Consideration (Choose all that apply): None.     SEPSIS – was this patient treated for sepsis? No.     This patient is expected to require at least two days of inpatient care: Yes.     Present on Admission - Central Line: No.     Present on Admission - Urethral Catheter: No.     Present on Admission - Pressure Ulcer: No.     Case plan discussed with Inpatient Resident/ACP on: 24-Mar-2024 09:01.     Inpatient Resident/ACP sign out complete, Patient endorsed to: stroke pa.    Accepting Physician:   Provider Role	Provider Name  · Accepting Physician	Raudel Rubio    Prescriptions:   * Outpatient Medication Status not yet specified  ATTESTATION STATEMENT:    Attestations Statements:  Attending Statement: I have personally provided the amount of critical care time documented below excluding time spent on separate procedures.     Critical Care Time Spent (min) Must be 30 or more minutes to qualify: 35.     Attending Contribution to Care: see note.    PROVIDER CARE INITIATION:   · Care Initiated by:	Henrry Wheeler(Attending)  · Provider Care Initiated at:	24-Mar-2024 08:21      Electronic Signatures:  Henrry Wheeler)  (Signed 24-Mar-2024 09:04)  	Authored: HISTORY OF PRESENT ILLNESS, HIV, PAST MEDICAL/SURGICAL/FAMILY/SOCIAL HISTORY, ALLERGIES AND HOME MEDICATIONS, REVIEW OF SYSTEMS, PHYSICAL EXAM, CURRENT ORDERS/, RESULTS, DISPOSITION, ATTESTATION STATEMENT, STROKE, PROVIDER CARE INITIATION      Last Updated: 24-Mar-2024 09:04 by Henrry Wheeler)    References:  1.  Data Referenced From "ED ADULT Triage Note" 24-Mar-2024 08:20

## 2024-03-24 NOTE — BRIEF OPERATIVE NOTE - OPERATION/FINDINGS
Patient was brought emergently to neuroangiography suite, was placed on standard anesthesia monitors, sedated, intubated and under general anesthesia, using an 8 fr sheath right CFA.  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  mechanical thrombectomy using aspiration x3 with no recanalization, followed by stent tri ever/aspirtation combination x2 passes for near complete recanalization.    It was noted that an early frontal M2 branch was also occluded, the vessel was microcatheterized and stent tri ever deployed and pulled back with concomitant aspiration   performed for a final TICI of 2B from TICI 0  Full report to follow.  Patient tolerated procedure well, no new neurological deficit, hemodynamically stable.  Right _____/vasc access site: Closed with:_________ and manual compression, hemostasis achieved, no hematoma.  Patient was transferred to Cath lab recovery area and will go home later today.  Above d/w:   Patient was brought emergently to neuroangiography suite, was placed on standard anesthesia monitors, sedated, intubated and under general anesthesia, using an 8 fr sheath right CFA.  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  mechanical thrombectomy using aspiration x3 with no recanalization, followed by stent tri ever/aspirtation combination x2 passes for near complete recanalization.    It was noted that an early frontal M2 branch was also occluded, the vessel was microcatheterized and stent tri ever deployed and pulled back with concomitant aspiration   performed for a final TICI of 2B from TICI 0  Full report to follow.  Patient tolerated procedure well, no new neurological deficit, hemodynamically stable.  Right groin/vasc access site: Closed with: 8 fr angioseal and manual compression, hemostasis achieved, no hematoma.  An X-Per CT was performed and reviewed by Dr. Rubio and Dr. Gomez, there is no large territorial infarct or heme.  Patient was extubated and transferred to NSICU  Above d/w: Dr. Rubio

## 2024-03-24 NOTE — BRIEF OPERATIVE NOTE - SECOND ASSIST PARTICIPATION DETAILS - (COMPONENTS OF THE PROCEDURE THAT ASSISTANT PARTICIPATED IN)
I acted within this role throughout the entirety of the procedure performed by the primary surgeon 9

## 2024-03-24 NOTE — CONSULT NOTE ADULT - SUBJECTIVE AND OBJECTIVE BOX
**STROKE CODE CONSULT NOTE**    Last known well time/Time of onset of symptoms:    HPI: 73y Male with PMHx of     T(C): --  HR: 88 (03-24-24 @ 09:16) (87 - 92)  BP: 149/75 (03-24-24 @ 09:16) (134/77 - 158/54)  RR: 18 (03-24-24 @ 09:16) (16 - 18)  SpO2: 99% (03-24-24 @ 09:16) (98% - 100%)    PAST MEDICAL & SURGICAL HISTORY:      FAMILY HISTORY:      SOCIAL HISTORY:   Patient lives with *** at ***.   Smoking status:  Drinking:  Drug Use:     ROS: ***  Constitutional: No fever, weight loss or fatigue  Eyes: No eye pain, visual disturbances, or discharge  ENMT:  No difficulty hearing, tinnitus; No sinus or throat pain  Neck: No pain or stiffness  Respiratory: No cough, wheezing, chills or hemoptysis  Cardiovascular: No chest pain, palpitations, shortness of breath, or leg swelling  Gastrointestinal: No abdominal pain. No nausea, vomiting or hematemesis; No diarrhea or constipation. Nohematochezia.  Genitourinary: No dysuria, frequency, hematuria or incontinence  Neurological: As per HPI  Skin: No itching, burning, rashes or lesions   Endocrine: No heat or cold intolerance; No hair loss  Musculoskeletal: No joint pain or swelling; No muscle, back or extremity pain  Heme/Lymph: No easy bruising or bleeding gums    MEDICATIONS  (STANDING):    MEDICATIONS  (PRN):    Allergies    Allergy Status Unknown    Intolerances      Vital Signs Last 24 Hrs  T(C): --  T(F): --  HR: 88 (24 Mar 2024 09:16) (87 - 92)  BP: 149/75 (24 Mar 2024 09:16) (134/77 - 158/54)  BP(mean): --  RR: 18 (24 Mar 2024 09:16) (16 - 18)  SpO2: 99% (24 Mar 2024 09:16) (98% - 100%)    Parameters below as of 24 Mar 2024 09:16  Patient On (Oxygen Delivery Method): room air        Physical exam:  Constitutional: No acute distress, conversant  Eyes: Anicteric sclerae, moist conjunctivae, see below for CNs  Neck: trachea midline    Neurologic:  -Mental status: Awake, alert, oriented to person, age, and month. Speech is fluent with intact naming, repetition, and comprehension, moderate dysarthria. Recent and remote memory intact. Follows commands. Attention/concentration intact. Fund of knowledge appropriate.  -Cranial nerves:   II: No blink to threat of left visual field  III, IV, VI: Right gaze preference unable to overcome. Pupils equally round and reactive to light  VII: Left facial droop  Motor: Normal bulk and tone. Right side 5/5. Left arm 0/5, Left leg able to lift against gravity but immediately falls, 3/5  Sensation: Left hemineglect and sensation loss  Coordination: No dysmetria on finger-to-nose of right side     NIHSS: 17    Fingerstick Blood Glucose: CAPILLARY BLOOD GLUCOSE  106 (24 Mar 2024 08:48)      POCT Blood Glucose.: 106 mg/dL (24 Mar 2024 08:21)    LABS:                        14.2   6.12  )-----------( 159      ( 24 Mar 2024 08:22 )             41.8     03-24    141  |  106  |  13  ----------------------------<  120<H>  4.6   |  22  |  0.79    Ca    9.4      24 Mar 2024 08:22    TPro  6.9  /  Alb  4.4  /  TBili  0.7  /  DBili  x   /  AST  22  /  ALT  13  /  AlkPhos  111  03-24    PT/INR - ( 24 Mar 2024 08:22 )   PT: 11.2 sec;   INR: 0.98          PTT - ( 24 Mar 2024 08:22 )  PTT:24.3 sec      Urinalysis Basic - ( 24 Mar 2024 08:22 )    Color: x / Appearance: x / SG: x / pH: x  Gluc: 120 mg/dL / Ketone: x  / Bili: x / Urobili: x   Blood: x / Protein: x / Nitrite: x   Leuk Esterase: x / RBC: x / WBC x   Sq Epi: x / Non Sq Epi: x / Bacteria: x        RADIOLOGY & ADDITIONAL STUDIES:      -----------------------------------------------------------------------------------------------------------------  IV-thrombolytic (Y/N):    ***                              Bolus time:    Tenecteplase Dose Verification w/ RN:  Reason thrombolytic not given: ***    **STROKE CODE CONSULT NOTE**    Last known well time/Time of onset of symptoms:    HPI: 73y Male with PMHx of HTN, BPH, HLD, coming in with left sided weakness. Pt states that he woke up at 5 am and went for a run. He came back and went to shower. He said he slipped and hit his head on the shower door.      T(C): --  HR: 88 (03-24-24 @ 09:16) (87 - 92)  BP: 149/75 (03-24-24 @ 09:16) (134/77 - 158/54)  RR: 18 (03-24-24 @ 09:16) (16 - 18)  SpO2: 99% (03-24-24 @ 09:16) (98% - 100%)    PAST MEDICAL & SURGICAL HISTORY:      FAMILY HISTORY:      SOCIAL HISTORY:   Patient lives with *** at ***.   Smoking status:  Drinking:  Drug Use:     ROS: ***  Constitutional: No fever, weight loss or fatigue  Eyes: No eye pain, visual disturbances, or discharge  ENMT:  No difficulty hearing, tinnitus; No sinus or throat pain  Neck: No pain or stiffness  Respiratory: No cough, wheezing, chills or hemoptysis  Cardiovascular: No chest pain, palpitations, shortness of breath, or leg swelling  Gastrointestinal: No abdominal pain. No nausea, vomiting or hematemesis; No diarrhea or constipation. Nohematochezia.  Genitourinary: No dysuria, frequency, hematuria or incontinence  Neurological: As per HPI  Skin: No itching, burning, rashes or lesions   Endocrine: No heat or cold intolerance; No hair loss  Musculoskeletal: No joint pain or swelling; No muscle, back or extremity pain  Heme/Lymph: No easy bruising or bleeding gums    MEDICATIONS  (STANDING):    MEDICATIONS  (PRN):    Allergies    Allergy Status Unknown    Intolerances      Vital Signs Last 24 Hrs  T(C): --  T(F): --  HR: 88 (24 Mar 2024 09:16) (87 - 92)  BP: 149/75 (24 Mar 2024 09:16) (134/77 - 158/54)  BP(mean): --  RR: 18 (24 Mar 2024 09:16) (16 - 18)  SpO2: 99% (24 Mar 2024 09:16) (98% - 100%)    Parameters below as of 24 Mar 2024 09:16  Patient On (Oxygen Delivery Method): room air        Physical exam:  Constitutional: No acute distress, conversant  Eyes: Anicteric sclerae, moist conjunctivae, see below for CNs  Neck: trachea midline    Neurologic:  -Mental status: Awake, alert, oriented to person, age, and month. Speech is fluent with intact naming, repetition, and comprehension, moderate dysarthria. Recent and remote memory intact. Follows commands. Attention/concentration intact. Fund of knowledge appropriate.  -Cranial nerves:   II: No blink to threat of left visual field  III, IV, VI: Right gaze preference unable to overcome. Pupils equally round and reactive to light  VII: Left facial droop  Motor: Normal bulk and tone. Right side 5/5. Left arm 0/5, Left leg able to lift against gravity but immediately falls, 3/5  Sensation: Left hemineglect and sensation loss  Coordination: No dysmetria on finger-to-nose of right side     NIHSS: 17    Fingerstick Blood Glucose: CAPILLARY BLOOD GLUCOSE  106 (24 Mar 2024 08:48)      POCT Blood Glucose.: 106 mg/dL (24 Mar 2024 08:21)    LABS:                        14.2   6.12  )-----------( 159      ( 24 Mar 2024 08:22 )             41.8     03-24    141  |  106  |  13  ----------------------------<  120<H>  4.6   |  22  |  0.79    Ca    9.4      24 Mar 2024 08:22    TPro  6.9  /  Alb  4.4  /  TBili  0.7  /  DBili  x   /  AST  22  /  ALT  13  /  AlkPhos  111  03-24    PT/INR - ( 24 Mar 2024 08:22 )   PT: 11.2 sec;   INR: 0.98          PTT - ( 24 Mar 2024 08:22 )  PTT:24.3 sec      Urinalysis Basic - ( 24 Mar 2024 08:22 )    Color: x / Appearance: x / SG: x / pH: x  Gluc: 120 mg/dL / Ketone: x  / Bili: x / Urobili: x   Blood: x / Protein: x / Nitrite: x   Leuk Esterase: x / RBC: x / WBC x   Sq Epi: x / Non Sq Epi: x / Bacteria: x        RADIOLOGY & ADDITIONAL STUDIES:      -----------------------------------------------------------------------------------------------------------------  IV-thrombolytic (Y/N):    ***                              Bolus time:    Tenecteplase Dose Verification w/ RN:  Reason thrombolytic not given: ***    **STROKE CODE CONSULT NOTE**    Last known well time/Time of onset of symptoms: 7:45AM    HPI: 73y Male with PMHx of HTN, BPH, HLD, coming in with left sided weakness. Pt states that he woke up at 5 am and went for a run. He came back and went to shower. He said he slipped and hit his head on the shower door.  Wife states that he heard a thud from the shower around 7:45AM. She went in and noticed his left side was weak and his head was against the shower door. He had not fallen to the ground. She called her daughter who came by, and they also called EMS.  Pt states that he slipped and fell, does not think he has any actual weakness. He wonders how his dog is doing. 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 NIHSS 17.     Case discussed with Dr. Narinder Sherwood who discussed with Dr. Schreiber prior to urgent intervention. Risk and benefits of tenecteplase were discussed with patient/family member including risk of symptomatic ICH/death. Decision was made that benefits outweighed risks and tenecteplase was administered. Thrombectomy was discussed between Neurology and Neuro IR Attendings and decision was made to proceed with thrombectomy.    Pt was seen about 30 minutes after TNK was given, NIHSS improved to 15.      T(C): --  HR: 88 (03-24-24 @ 09:16) (87 - 92)  BP: 149/75 (03-24-24 @ 09:16) (134/77 - 158/54)  RR: 18 (03-24-24 @ 09:16) (16 - 18)  SpO2: 99% (03-24-24 @ 09:16) (98% - 100%)    PAST MEDICAL & SURGICAL HISTORY:      FAMILY HISTORY:      SOCIAL HISTORY:   Patient lives with *** at ***.   Smoking status:  Drinking:  Drug Use:     ROS: ***  Constitutional: No fever, weight loss or fatigue  Eyes: No eye pain, visual disturbances, or discharge  ENMT:  No difficulty hearing, tinnitus; No sinus or throat pain  Neck: No pain or stiffness  Respiratory: No cough, wheezing, chills or hemoptysis  Cardiovascular: No chest pain, palpitations, shortness of breath, or leg swelling  Gastrointestinal: No abdominal pain. No nausea, vomiting or hematemesis; No diarrhea or constipation. Nohematochezia.  Genitourinary: No dysuria, frequency, hematuria or incontinence  Neurological: As per HPI  Skin: No itching, burning, rashes or lesions   Endocrine: No heat or cold intolerance; No hair loss  Musculoskeletal: No joint pain or swelling; No muscle, back or extremity pain  Heme/Lymph: No easy bruising or bleeding gums    MEDICATIONS  (STANDING):    MEDICATIONS  (PRN):    Allergies    Allergy Status Unknown    Intolerances      Vital Signs Last 24 Hrs  T(C): --  T(F): --  HR: 88 (24 Mar 2024 09:16) (87 - 92)  BP: 149/75 (24 Mar 2024 09:16) (134/77 - 158/54)  BP(mean): --  RR: 18 (24 Mar 2024 09:16) (16 - 18)  SpO2: 99% (24 Mar 2024 09:16) (98% - 100%)    Parameters below as of 24 Mar 2024 09:16  Patient On (Oxygen Delivery Method): room air        Physical exam:  Constitutional: No acute distress, conversant  Eyes: Anicteric sclerae, moist conjunctivae, see below for CNs  Neck: trachea midline    Neurologic:  -Mental status: Awake, alert, oriented to person, age, and month. Speech is fluent with intact naming, repetition, and comprehension, moderate dysarthria. Recent and remote memory intact. Follows commands. Attention/concentration intact. Fund of knowledge appropriate.  -Cranial nerves:   II: No blink to threat of left visual field  III, IV, VI: Right gaze preference unable to overcome. Pupils equally round and reactive to light  VII: Left facial droop  Motor: Normal bulk and tone. Right side 5/5. Left arm 0/5, Left leg able to lift against gravity but immediately falls, 3/5  Sensation: Left hemineglect and sensation loss  Coordination: No dysmetria on finger-to-nose of right side     NIHSS: 17    Fingerstick Blood Glucose: CAPILLARY BLOOD GLUCOSE  106 (24 Mar 2024 08:48)      POCT Blood Glucose.: 106 mg/dL (24 Mar 2024 08:21)    LABS:                        14.2   6.12  )-----------( 159      ( 24 Mar 2024 08:22 )             41.8     03-24    141  |  106  |  13  ----------------------------<  120<H>  4.6   |  22  |  0.79    Ca    9.4      24 Mar 2024 08:22    TPro  6.9  /  Alb  4.4  /  TBili  0.7  /  DBili  x   /  AST  22  /  ALT  13  /  AlkPhos  111  03-24    PT/INR - ( 24 Mar 2024 08:22 )   PT: 11.2 sec;   INR: 0.98          PTT - ( 24 Mar 2024 08:22 )  PTT:24.3 sec      Urinalysis Basic - ( 24 Mar 2024 08:22 )    Color: x / Appearance: x / SG: x / pH: x  Gluc: 120 mg/dL / Ketone: x  / Bili: x / Urobili: x   Blood: x / Protein: x / Nitrite: x   Leuk Esterase: x / RBC: x / WBC x   Sq Epi: x / Non Sq Epi: x / Bacteria: x        RADIOLOGY & ADDITIONAL STUDIES:      -----------------------------------------------------------------------------------------------------------------  IV-thrombolytic (Y/N):    ***                              Bolus time:    Tenecteplase Dose Verification w/ RN:  Reason thrombolytic not given: ***    **STROKE CODE CONSULT NOTE**    Last known well time/Time of onset of symptoms: 7:45AM    HPI: 73y Male with PMHx of HTN, BPH, HLD, coming in with left sided weakness. Pt states that he woke up at 5 am and went for a run. He came back and went to shower. He said he slipped and hit his head on the shower door.  Wife states that he heard a thud from the shower around 7:45AM. She went in and noticed his left side was weak and his head was against the shower door. He had not fallen to the ground. She called her daughter who came by, and they also called EMS.  Pt states that he slipped and fell, does not think he has any actual weakness. He wonders how his dog is doing. 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 NIHSS 17. Pt states he has a headache, 9/10 but says it isn't that bad and does not need medication for the headache.    Case discussed with Dr. Narinder Sherwood who discussed with Dr. Schreiber prior to urgent intervention. Risk and benefits of tenecteplase were discussed with patient/family member including risk of symptomatic ICH/death. Decision was made that benefits outweighed risks and tenecteplase was administered. Thrombectomy was discussed between Neurology and Neuro IR Attendings and decision was made to proceed with thrombectomy. Tenecteplase given at 8:46AM.     Pt was seen about 30 minutes after TNK was given, NIHSS improved to 15. Headache similar to how it was prior to tenecteplase administration.       T(C): --  HR: 88 (03-24-24 @ 09:16) (87 - 92)  BP: 149/75 (03-24-24 @ 09:16) (134/77 - 158/54)  RR: 18 (03-24-24 @ 09:16) (16 - 18)  SpO2: 99% (03-24-24 @ 09:16) (98% - 100%)    PAST MEDICAL & SURGICAL HISTORY:      FAMILY HISTORY:      SOCIAL HISTORY:   Patient lives with wife    ROS:   Constitutional: No fever, weight loss or fatigue  Eyes: No eye pain, visual disturbances, or discharge  ENMT:  No difficulty hearing, tinnitus; No sinus or throat pain  Neck: No pain or stiffness  Respiratory: No cough, wheezing, chills or hemoptysis  Cardiovascular: No chest pain, palpitations, shortness of breath.  Gastrointestinal: No abdominal pain. No nausea, vomiting or hematemesis; No diarrhea or constipation. Nohematochezia.  Genitourinary: No dysuria, frequency, hematuria or incontinence  Neurological: As per HPI    MEDICATIONS  (STANDING):    MEDICATIONS  (PRN):    Allergies    Allergy Status Unknown    Intolerances      Vital Signs Last 24 Hrs  T(C): --  T(F): --  HR: 88 (24 Mar 2024 09:16) (87 - 92)  BP: 149/75 (24 Mar 2024 09:16) (134/77 - 158/54)  BP(mean): --  RR: 18 (24 Mar 2024 09:16) (16 - 18)  SpO2: 99% (24 Mar 2024 09:16) (98% - 100%)    Parameters below as of 24 Mar 2024 09:16  Patient On (Oxygen Delivery Method): room air        Physical exam:  Constitutional: No acute distress, conversant  Eyes: Anicteric sclerae, moist conjunctivae, see below for CNs  Neck: trachea midline    Neurologic:  -Mental status: Awake, alert, oriented to person, age, and month. Speech is fluent with intact naming, repetition, and comprehension, moderate dysarthria. Recent and remote memory intact. Follows commands. Attention/concentration intact. Fund of knowledge appropriate.  -Cranial nerves:   II: No blink to threat of left visual field  III, IV, VI: Right gaze preference unable to overcome. Pupils equally round and reactive to light  VII: Left facial droop  Motor: Normal bulk and tone. Right side 5/5. Left arm 0/5, Left leg able to lift against gravity but immediately falls, 3/5  Sensation: Left hemineglect and sensation loss  Coordination: No dysmetria on finger-to-nose of right side     NIHSS: 17    Fingerstick Blood Glucose: CAPILLARY BLOOD GLUCOSE  106 (24 Mar 2024 08:48)      POCT Blood Glucose.: 106 mg/dL (24 Mar 2024 08:21)    LABS:                        14.2   6.12  )-----------( 159      ( 24 Mar 2024 08:22 )             41.8     03-24    141  |  106  |  13  ----------------------------<  120<H>  4.6   |  22  |  0.79    Ca    9.4      24 Mar 2024 08:22    TPro  6.9  /  Alb  4.4  /  TBili  0.7  /  DBili  x   /  AST  22  /  ALT  13  /  AlkPhos  111  03-24    PT/INR - ( 24 Mar 2024 08:22 )   PT: 11.2 sec;   INR: 0.98          PTT - ( 24 Mar 2024 08:22 )  PTT:24.3 sec      Urinalysis Basic - ( 24 Mar 2024 08:22 )    Color: x / Appearance: x / SG: x / pH: x  Gluc: 120 mg/dL / Ketone: x  / Bili: x / Urobili: x   Blood: x / Protein: x / Nitrite: x   Leuk Esterase: x / RBC: x / WBC x   Sq Epi: x / Non Sq Epi: x / Bacteria: x        RADIOLOGY & ADDITIONAL STUDIES:      -----------------------------------------------------------------------------------------------------------------  IV-thrombolytic (Y/N):    ***                              Bolus time:    Tenecteplase Dose Verification w/ RN:  Reason thrombolytic not given: ***    **STROKE CODE CONSULT NOTE**    Last known well time/Time of onset of symptoms: 7:45AM    HPI: 73y Male with PMHx of HTN, BPH, HLD, coming in with left sided weakness. Pt states that he woke up at 5 am and went for a run. He came back and went to shower. He said he slipped and hit his head on the shower door.  Wife states that he heard a thud from the shower around 7:45AM. She went in and noticed his left side was weak and his head was against the shower door. He had not fallen to the ground. She called her daughter who came by, and they also called EMS.  Pt states that he slipped and fell, does not think he has any actual weakness. He wonders how his dog is doing. 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 NIHSS 17. Pt states he has a headache, 9/10 but says it isn't that bad and does not need medication for the headache.    Case discussed with Dr. Narinder Sherwodo who discussed with Dr. Schreiber prior to urgent intervention. Risk and benefits of tenecteplase were discussed with patient/family member including risk of symptomatic ICH/death. Decision was made that benefits outweighed risks and tenecteplase was administered. Thrombectomy was discussed between Neurology and Neuro IR Attendings and decision was made to proceed with thrombectomy. Tenecteplase given at 8:46AM.     Pt was seen about 30 minutes after TNK was given, NIHSS improved to 15. Headache similar to how it was prior to tenecteplase administration.       T(C): --  HR: 88 (03-24-24 @ 09:16) (87 - 92)  BP: 149/75 (03-24-24 @ 09:16) (134/77 - 158/54)  RR: 18 (03-24-24 @ 09:16) (16 - 18)  SpO2: 99% (03-24-24 @ 09:16) (98% - 100%)    PAST MEDICAL & SURGICAL HISTORY:      FAMILY HISTORY:      SOCIAL HISTORY:   Patient lives with wife    ROS:   Constitutional: No fever, weight loss or fatigue  Eyes: No eye pain, visual disturbances, or discharge  ENMT:  No difficulty hearing, tinnitus; No sinus or throat pain  Neck: No pain or stiffness  Respiratory: No cough, wheezing, chills or hemoptysis  Cardiovascular: No chest pain, palpitations, shortness of breath.  Gastrointestinal: No abdominal pain. No nausea, vomiting or hematemesis; No diarrhea or constipation. Nohematochezia.  Genitourinary: No dysuria, frequency, hematuria or incontinence  Neurological: As per HPI    MEDICATIONS  (STANDING):    MEDICATIONS  (PRN):    Allergies    Allergy Status Unknown    Intolerances      Vital Signs Last 24 Hrs  T(C): --  T(F): --  HR: 88 (24 Mar 2024 09:16) (87 - 92)  BP: 149/75 (24 Mar 2024 09:16) (134/77 - 158/54)  BP(mean): --  RR: 18 (24 Mar 2024 09:16) (16 - 18)  SpO2: 99% (24 Mar 2024 09:16) (98% - 100%)    Parameters below as of 24 Mar 2024 09:16  Patient On (Oxygen Delivery Method): room air        Physical exam:  Constitutional: No acute distress, conversant  Eyes: Anicteric sclerae, moist conjunctivae, see below for CNs  Neck: trachea midline    Neurologic:  -Mental status: Awake, alert, oriented to person, age, and month. Speech is fluent with intact naming, repetition, and comprehension, moderate dysarthria. Recent and remote memory intact. Follows commands. Attention/concentration intact. Fund of knowledge appropriate.  -Cranial nerves:   II: No blink to threat of left visual field  III, IV, VI: Right gaze preference unable to overcome. Pupils equally round and reactive to light  VII: Left facial droop  Motor: Normal bulk and tone. Right side 5/5. Left arm 0/5, Left leg able to lift against gravity but immediately falls, 3/5  Sensation: Left hemineglect and sensation loss  Coordination: No dysmetria on finger-to-nose of right side     NIHSS: 17    Fingerstick Blood Glucose: CAPILLARY BLOOD GLUCOSE  106 (24 Mar 2024 08:48)      POCT Blood Glucose.: 106 mg/dL (24 Mar 2024 08:21)    LABS:                        14.2   6.12  )-----------( 159      ( 24 Mar 2024 08:22 )             41.8     03-24    141  |  106  |  13  ----------------------------<  120<H>  4.6   |  22  |  0.79    Ca    9.4      24 Mar 2024 08:22    TPro  6.9  /  Alb  4.4  /  TBili  0.7  /  DBili  x   /  AST  22  /  ALT  13  /  AlkPhos  111  03-24    PT/INR - ( 24 Mar 2024 08:22 )   PT: 11.2 sec;   INR: 0.98          PTT - ( 24 Mar 2024 08:22 )  PTT:24.3 sec      Urinalysis Basic - ( 24 Mar 2024 08:22 )    Color: x / Appearance: x / SG: x / pH: x  Gluc: 120 mg/dL / Ketone: x  / Bili: x / Urobili: x   Blood: x / Protein: x / Nitrite: x   Leuk Esterase: x / RBC: x / WBC x   Sq Epi: x / Non Sq Epi: x / Bacteria: x        RADIOLOGY & ADDITIONAL STUDIES:    < from: CT Brain Stroke Protocol (03.24.24 @ 08:30) >  IMPRESSION:  Motion limited study.  No gross acute intracranial hemorrhage or mass effect.  Hyperdense right M1 MCA, concerning for thrombosis.    < end of copied text >      < from: CT Angio Neck Stroke Protocol w/ IV Cont (03.24.24 @ 08:55) >  IMPRESSION:    CT PERFUSION: Small core infarct, large ischemic penumbra in the right   MCA territory.    CTA COW: Abrupt right M1 occlusion with M2 reconstitution.    CTA NECK: Patent, ECAs, ICAs, no  hemodynamically significant stenosis at    ICA origins by NASCET criteria.  Bilateral vertebral arteries are patent without flow limiting stenosis.    < end of copied text >    -----------------------------------------------------------------------------------------------------------------  IV-thrombolytic (Y/N):    Y                              Bolus time:  8:46AM

## 2024-03-24 NOTE — H&P ADULT - ASSESSMENT
74 M with acute right M1 occlusion NIHSS 17, s/p IV TNK with minimal improvement to NIHSS 15, deemed candidate for mechanical thrombectomy, consent obtained and in the chart.  Plan top proceed with ELLEN.  Admit to NSICU post ELLEN

## 2024-03-24 NOTE — H&P ADULT - HISTORY OF PRESENT ILLNESS
HPI: 73y Male with PMHx of HTN, BPH, HLD, coming in with left sided weakness. Pt states that he woke up at 5 am and went for a run. He came back and went to shower. He said he slipped and hit his head on the shower door.  Wife states that he heard a thud from the shower around 7:45AM. She went in and noticed his left side was weak and his head was against the shower door. He had not fallen to the ground. She called her daughter who came by, and they also called EMS.  Pt states that he slipped and fell, does not think he has any actual weakness. He wonders how his dog is doing. 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 NIHSS 17. Pt states he has a headache, 9/10 but says it isn't that bad and does not need medication for the headache.    Case discussed with Dr. Narinder Sherwood who discussed with Dr. Schreiber prior to urgent intervention. Risk and benefits of tenecteplase were discussed with patient/family member including risk of symptomatic ICH/death. Decision was made that benefits outweighed risks and tenecteplase was administered. Thrombectomy was discussed between Neurology and Neuro IR Attendings and decision was made to proceed with thrombectomy. Tenecteplase given at 8:46AM.     Pt was seen about 30 minutes after TNK was given, NIHSS improved to 15. Headache similar to how it was prior to tenecteplase administration.

## 2024-03-25 LAB
A1C WITH ESTIMATED AVERAGE GLUCOSE RESULT: 5.5 % — SIGNIFICANT CHANGE UP (ref 4–5.6)
ANION GAP SERPL CALC-SCNC: 9 MMOL/L — SIGNIFICANT CHANGE UP (ref 5–17)
BUN SERPL-MCNC: 8 MG/DL — SIGNIFICANT CHANGE UP (ref 7–23)
CALCIUM SERPL-MCNC: 8.6 MG/DL — SIGNIFICANT CHANGE UP (ref 8.4–10.5)
CHLORIDE SERPL-SCNC: 106 MMOL/L — SIGNIFICANT CHANGE UP (ref 96–108)
CHOLEST SERPL-MCNC: 137 MG/DL — SIGNIFICANT CHANGE UP
CO2 SERPL-SCNC: 22 MMOL/L — SIGNIFICANT CHANGE UP (ref 22–31)
CREAT SERPL-MCNC: 0.69 MG/DL — SIGNIFICANT CHANGE UP (ref 0.5–1.3)
EGFR: 98 ML/MIN/1.73M2 — SIGNIFICANT CHANGE UP
ESTIMATED AVERAGE GLUCOSE: 111 MG/DL — SIGNIFICANT CHANGE UP (ref 68–114)
GLUCOSE BLDC GLUCOMTR-MCNC: 114 MG/DL — HIGH (ref 70–99)
GLUCOSE BLDC GLUCOMTR-MCNC: 72 MG/DL — SIGNIFICANT CHANGE UP (ref 70–99)
GLUCOSE BLDC GLUCOMTR-MCNC: 77 MG/DL — SIGNIFICANT CHANGE UP (ref 70–99)
GLUCOSE BLDC GLUCOMTR-MCNC: 92 MG/DL — SIGNIFICANT CHANGE UP (ref 70–99)
GLUCOSE SERPL-MCNC: 105 MG/DL — HIGH (ref 70–99)
HCT VFR BLD CALC: 34 % — LOW (ref 39–50)
HCV AB S/CO SERPL IA: 0.04 S/CO — SIGNIFICANT CHANGE UP
HCV AB SERPL-IMP: SIGNIFICANT CHANGE UP
HDLC SERPL-MCNC: 65 MG/DL — SIGNIFICANT CHANGE UP
HGB BLD-MCNC: 11.6 G/DL — LOW (ref 13–17)
LIPID PNL WITH DIRECT LDL SERPL: 59 MG/DL — SIGNIFICANT CHANGE UP
MAGNESIUM SERPL-MCNC: 1.9 MG/DL — SIGNIFICANT CHANGE UP (ref 1.6–2.6)
MCHC RBC-ENTMCNC: 33.1 PG — SIGNIFICANT CHANGE UP (ref 27–34)
MCHC RBC-ENTMCNC: 34.1 GM/DL — SIGNIFICANT CHANGE UP (ref 32–36)
MCV RBC AUTO: 97.1 FL — SIGNIFICANT CHANGE UP (ref 80–100)
NON HDL CHOLESTEROL: 72 MG/DL — SIGNIFICANT CHANGE UP
NRBC # BLD: 0 /100 WBCS — SIGNIFICANT CHANGE UP (ref 0–0)
PHOSPHATE SERPL-MCNC: 3.1 MG/DL — SIGNIFICANT CHANGE UP (ref 2.5–4.5)
PLATELET # BLD AUTO: 122 K/UL — LOW (ref 150–400)
POTASSIUM SERPL-MCNC: 3.8 MMOL/L — SIGNIFICANT CHANGE UP (ref 3.5–5.3)
POTASSIUM SERPL-SCNC: 3.8 MMOL/L — SIGNIFICANT CHANGE UP (ref 3.5–5.3)
RBC # BLD: 3.5 M/UL — LOW (ref 4.2–5.8)
RBC # FLD: 13 % — SIGNIFICANT CHANGE UP (ref 10.3–14.5)
SODIUM SERPL-SCNC: 137 MMOL/L — SIGNIFICANT CHANGE UP (ref 135–145)
TRIGL SERPL-MCNC: 59 MG/DL — SIGNIFICANT CHANGE UP
TSH SERPL-MCNC: 0.6 UIU/ML — SIGNIFICANT CHANGE UP (ref 0.27–4.2)
WBC # BLD: 5.57 K/UL — SIGNIFICANT CHANGE UP (ref 3.8–10.5)
WBC # FLD AUTO: 5.57 K/UL — SIGNIFICANT CHANGE UP (ref 3.8–10.5)

## 2024-03-25 PROCEDURE — 99233 SBSQ HOSP IP/OBS HIGH 50: CPT

## 2024-03-25 PROCEDURE — 70450 CT HEAD/BRAIN W/O DYE: CPT | Mod: 26

## 2024-03-25 PROCEDURE — 71045 X-RAY EXAM CHEST 1 VIEW: CPT | Mod: 26

## 2024-03-25 PROCEDURE — 99291 CRITICAL CARE FIRST HOUR: CPT

## 2024-03-25 RX ORDER — ASPIRIN/CALCIUM CARB/MAGNESIUM 324 MG
81 TABLET ORAL DAILY
Refills: 0 | Status: DISCONTINUED | OUTPATIENT
Start: 2024-03-25 | End: 2024-04-05

## 2024-03-25 RX ORDER — TOPIRAMATE 25 MG
25 TABLET ORAL EVERY 24 HOURS
Refills: 0 | Status: DISCONTINUED | OUTPATIENT
Start: 2024-03-25 | End: 2024-03-26

## 2024-03-25 RX ORDER — CLOPIDOGREL BISULFATE 75 MG/1
75 TABLET, FILM COATED ORAL DAILY
Refills: 0 | Status: DISCONTINUED | OUTPATIENT
Start: 2024-03-25 | End: 2024-03-31

## 2024-03-25 RX ORDER — POTASSIUM CHLORIDE 20 MEQ
20 PACKET (EA) ORAL ONCE
Refills: 0 | Status: DISCONTINUED | OUTPATIENT
Start: 2024-03-25 | End: 2024-03-25

## 2024-03-25 RX ORDER — SODIUM CHLORIDE 9 MG/ML
1000 INJECTION INTRAMUSCULAR; INTRAVENOUS; SUBCUTANEOUS ONCE
Refills: 0 | Status: COMPLETED | OUTPATIENT
Start: 2024-03-25 | End: 2024-03-25

## 2024-03-25 RX ORDER — HYDROMORPHONE HYDROCHLORIDE 2 MG/ML
0.5 INJECTION INTRAMUSCULAR; INTRAVENOUS; SUBCUTANEOUS ONCE
Refills: 0 | Status: DISCONTINUED | OUTPATIENT
Start: 2024-03-25 | End: 2024-03-25

## 2024-03-25 RX ADMIN — Medication 650 MILLIGRAM(S): at 02:06

## 2024-03-25 RX ADMIN — Medication 650 MILLIGRAM(S): at 22:45

## 2024-03-25 RX ADMIN — Medication 81 MILLIGRAM(S): at 16:37

## 2024-03-25 RX ADMIN — Medication 650 MILLIGRAM(S): at 05:25

## 2024-03-25 RX ADMIN — SENNA PLUS 2 TABLET(S): 8.6 TABLET ORAL at 21:38

## 2024-03-25 RX ADMIN — FINASTERIDE 5 MILLIGRAM(S): 5 TABLET, FILM COATED ORAL at 13:12

## 2024-03-25 RX ADMIN — ATORVASTATIN CALCIUM 80 MILLIGRAM(S): 80 TABLET, FILM COATED ORAL at 21:38

## 2024-03-25 RX ADMIN — Medication 25 MILLIGRAM(S): at 22:07

## 2024-03-25 RX ADMIN — SODIUM CHLORIDE 250 MILLILITER(S): 9 INJECTION INTRAMUSCULAR; INTRAVENOUS; SUBCUTANEOUS at 22:53

## 2024-03-25 RX ADMIN — POLYETHYLENE GLYCOL 3350 17 GRAM(S): 17 POWDER, FOR SOLUTION ORAL at 13:12

## 2024-03-25 RX ADMIN — TRAMADOL HYDROCHLORIDE 50 MILLIGRAM(S): 50 TABLET ORAL at 03:58

## 2024-03-25 RX ADMIN — TRAMADOL HYDROCHLORIDE 50 MILLIGRAM(S): 50 TABLET ORAL at 05:25

## 2024-03-25 RX ADMIN — TAMSULOSIN HYDROCHLORIDE 0.4 MILLIGRAM(S): 0.4 CAPSULE ORAL at 21:38

## 2024-03-25 RX ADMIN — Medication 650 MILLIGRAM(S): at 21:57

## 2024-03-25 RX ADMIN — CLOPIDOGREL BISULFATE 75 MILLIGRAM(S): 75 TABLET, FILM COATED ORAL at 16:36
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
## 2024-03-25 NOTE — PHYSICAL THERAPY INITIAL EVALUATION ADULT - ADDITIONAL COMMENTS
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

## 2024-03-25 NOTE — PROGRESS NOTE ADULT - SUBJECTIVE AND OBJECTIVE BOX
Neurology Stroke Progress Note    INTERVAL HPI/OVERNIGHT EVENTS:  Patient seen and examined. Patient was A&Ox3 and was cooperative throughout the exam.     MEDICATIONS  (STANDING):  atorvastatin 80 milliGRAM(s) Oral at bedtime  chlorhexidine 2% Cloths 1 Application(s) Topical daily  finasteride 5 milliGRAM(s) Oral daily  insulin lispro (ADMELOG) corrective regimen sliding scale   SubCutaneous Before meals and at bedtime  polyethylene glycol 3350 17 Gram(s) Oral daily  senna 2 Tablet(s) Oral at bedtime  tamsulosin 0.4 milliGRAM(s) Oral at bedtime  topiramate 25 milliGRAM(s) Oral every 24 hours    MEDICATIONS  (PRN):  acetaminophen     Tablet .. 650 milliGRAM(s) Oral every 6 hours PRN Temp greater or equal to 38C (100.4F), Mild Pain (1 - 3)  ondansetron Injectable 4 milliGRAM(s) IV Push every 8 hours PRN Nausea and/or Vomiting  traMADol 25 milliGRAM(s) Oral every 6 hours PRN Moderate Pain (4 - 6)  traMADol 50 milliGRAM(s) Oral every 6 hours PRN Severe Pain (7 - 10)      Allergies    Allergy Status Unknown    Intolerances        Vital Signs Last 24 Hrs  T(C): 36.5 (25 Mar 2024 09:01), Max: 37.7 (24 Mar 2024 18:11)  T(F): 97.7 (25 Mar 2024 09:01), Max: 99.9 (24 Mar 2024 18:11)  HR: 61 (25 Mar 2024 11:35) (57 - 82)  BP: 131/69 (25 Mar 2024 11:35) (101/63 - 131/69)  BP(mean): 92 (25 Mar 2024 11:35) (76 - 92)  RR: 16 (25 Mar 2024 10:00) (13 - 25)  SpO2: 97% (25 Mar 2024 11:35) (94% - 100%)    Parameters below as of 25 Mar 2024 11:35  Patient On (Oxygen Delivery Method): room air        Physical exam:  General: No acute distress, awake and alert  Eyes: moist conjunctivae, see below for CNs  Neck: FROM  Cardiovascular: Regular rate and rhythm  Pulmonary: No use of accessory muscles  Extremities: No edema    Neurologic:  -Mental status: Awake, alert, oriented to person, place, and time. Speech is fluent with intact naming, repetition, and comprehension, no dysarthria. Recent and remote memory intact. Follows commands. Attention/concentration intact. Fund of knowledge appropriate.  -Cranial nerves:   III, IV, VI: Extraocular movements are intact without nystagmus. Pupils equally round and reactive to light  V:  Facial sensation V1-V3 equal and intact   VII: Left facial droop   Motor: Normal bulk and tone. Left upper extremity 4/5, Left lower extremity 3/5. Right upper and lower extremity 5/5.   Sensation: Intact to light touch bilaterally. No neglect or extinction on double simultaneous testing.  Coordination: No dysmetria on finger-to-nose bilaterally    LABS:                        11.6   5.57  )-----------( 122      ( 25 Mar 2024 05:09 )             34.0     03-25    137  |  106  |  8   ----------------------------<  105<H>  3.8   |  22  |  0.69    Ca    8.6      25 Mar 2024 05:09  Phos  3.1     03-25  Mg     1.9     03-25    TPro  5.6<L>  /  Alb  3.8  /  TBili  0.8  /  DBili  x   /  AST  17  /  ALT  10  /  AlkPhos  92  03-24    PT/INR - ( 24 Mar 2024 16:33 )   PT: 11.5 sec;   INR: 1.01          PTT - ( 24 Mar 2024 16:33 )  PTT:24.9 sec  Urinalysis Basic - ( 25 Mar 2024 05:09 )    Color: x / Appearance: x / SG: x / pH: x  Gluc: 105 mg/dL / Ketone: x  / Bili: x / Urobili: x   Blood: x / Protein: x / Nitrite: x   Leuk Esterase: x / RBC: x / WBC x   Sq Epi: x / Non Sq Epi: x / Bacteria: x        RADIOLOGY & ADDITIONAL TESTS:  Reviewed.   3/25 CTH: Right acute M1 infarct.  Neurology Stroke Progress Note    INTERVAL HPI/OVERNIGHT EVENTS:  Patient seen and examined. Patient was A&Ox3 and was cooperative throughout the exam.     MEDICATIONS  (STANDING):  atorvastatin 80 milliGRAM(s) Oral at bedtime  chlorhexidine 2% Cloths 1 Application(s) Topical daily  finasteride 5 milliGRAM(s) Oral daily  insulin lispro (ADMELOG) corrective regimen sliding scale   SubCutaneous Before meals and at bedtime  polyethylene glycol 3350 17 Gram(s) Oral daily  senna 2 Tablet(s) Oral at bedtime  tamsulosin 0.4 milliGRAM(s) Oral at bedtime  topiramate 25 milliGRAM(s) Oral every 24 hours    MEDICATIONS  (PRN):  acetaminophen     Tablet .. 650 milliGRAM(s) Oral every 6 hours PRN Temp greater or equal to 38C (100.4F), Mild Pain (1 - 3)  ondansetron Injectable 4 milliGRAM(s) IV Push every 8 hours PRN Nausea and/or Vomiting  traMADol 25 milliGRAM(s) Oral every 6 hours PRN Moderate Pain (4 - 6)  traMADol 50 milliGRAM(s) Oral every 6 hours PRN Severe Pain (7 - 10)      Allergies    Allergy Status Unknown    Intolerances        Vital Signs Last 24 Hrs  T(C): 36.5 (25 Mar 2024 09:01), Max: 37.7 (24 Mar 2024 18:11)  T(F): 97.7 (25 Mar 2024 09:01), Max: 99.9 (24 Mar 2024 18:11)  HR: 61 (25 Mar 2024 11:35) (57 - 82)  BP: 131/69 (25 Mar 2024 11:35) (101/63 - 131/69)  BP(mean): 92 (25 Mar 2024 11:35) (76 - 92)  RR: 16 (25 Mar 2024 10:00) (13 - 25)  SpO2: 97% (25 Mar 2024 11:35) (94% - 100%)    Parameters below as of 25 Mar 2024 11:35  Patient On (Oxygen Delivery Method): room air        Physical exam:  General: No acute distress, awake and alert  Eyes: moist conjunctivae, see below for CNs  Neck: FROM  Cardiovascular: Regular rate and rhythm  Pulmonary: No use of accessory muscles  Extremities: No edema    Neurologic:  -Mental status: Awake, alert, oriented to person, place, and time. Speech is fluent with intact naming, repetition, and comprehension, Mild dysarthria. Recent and remote memory intact. Follows commands. Attention/concentration intact. Fund of knowledge appropriate.  -Cranial nerves:   III, IV, VI: Extraocular movements are intact without nystagmus. Pupils equally round and reactive to light  V:  Facial sensation V1-V3 equal and intact   VII: Left facial droop   Motor: Normal bulk and tone. Left upper extremity drift with no pronation observed. Left upper extremity 4/5, Left lower extremity 3/5. Right upper and lower extremity 5/5.   Sensation: Intact to light touch bilaterally. No neglect or extinction on double simultaneous testing.  Coordination: No dysmetria on finger-to-nose bilaterally    LABS:                        11.6   5.57  )-----------( 122      ( 25 Mar 2024 05:09 )             34.0     03-25    137  |  106  |  8   ----------------------------<  105<H>  3.8   |  22  |  0.69    Ca    8.6      25 Mar 2024 05:09  Phos  3.1     03-25  Mg     1.9     03-25    TPro  5.6<L>  /  Alb  3.8  /  TBili  0.8  /  DBili  x   /  AST  17  /  ALT  10  /  AlkPhos  92  03-24    PT/INR - ( 24 Mar 2024 16:33 )   PT: 11.5 sec;   INR: 1.01          PTT - ( 24 Mar 2024 16:33 )  PTT:24.9 sec  Urinalysis Basic - ( 25 Mar 2024 05:09 )    Color: x / Appearance: x / SG: x / pH: x  Gluc: 105 mg/dL / Ketone: x  / Bili: x / Urobili: x   Blood: x / Protein: x / Nitrite: x   Leuk Esterase: x / RBC: x / WBC x   Sq Epi: x / Non Sq Epi: x / Bacteria: x        RADIOLOGY & ADDITIONAL TESTS:  Reviewed.   3/25 CTH: Right acute MCA infarct.  Neurology Stroke Progress Note    INTERVAL HPI/OVERNIGHT EVENTS:  Patient seen and examined. Patient was A&Ox3 and was cooperative throughout the exam. He complains of headache and rates the pain 10/10, consistent with headache that he presented with yesterday but has intensified today.    MEDICATIONS  (STANDING):  atorvastatin 80 milliGRAM(s) Oral at bedtime  chlorhexidine 2% Cloths 1 Application(s) Topical daily  finasteride 5 milliGRAM(s) Oral daily  insulin lispro (ADMELOG) corrective regimen sliding scale   SubCutaneous Before meals and at bedtime  polyethylene glycol 3350 17 Gram(s) Oral daily  senna 2 Tablet(s) Oral at bedtime  tamsulosin 0.4 milliGRAM(s) Oral at bedtime  topiramate 25 milliGRAM(s) Oral every 24 hours    MEDICATIONS  (PRN):  acetaminophen     Tablet .. 650 milliGRAM(s) Oral every 6 hours PRN Temp greater or equal to 38C (100.4F), Mild Pain (1 - 3)  ondansetron Injectable 4 milliGRAM(s) IV Push every 8 hours PRN Nausea and/or Vomiting  traMADol 25 milliGRAM(s) Oral every 6 hours PRN Moderate Pain (4 - 6)  traMADol 50 milliGRAM(s) Oral every 6 hours PRN Severe Pain (7 - 10)      Allergies    Allergy Status Unknown    Intolerances        Vital Signs Last 24 Hrs  T(C): 36.5 (25 Mar 2024 09:01), Max: 37.7 (24 Mar 2024 18:11)  T(F): 97.7 (25 Mar 2024 09:01), Max: 99.9 (24 Mar 2024 18:11)  HR: 61 (25 Mar 2024 11:35) (57 - 82)  BP: 131/69 (25 Mar 2024 11:35) (101/63 - 131/69)  BP(mean): 92 (25 Mar 2024 11:35) (76 - 92)  RR: 16 (25 Mar 2024 10:00) (13 - 25)  SpO2: 97% (25 Mar 2024 11:35) (94% - 100%)    Parameters below as of 25 Mar 2024 11:35  Patient On (Oxygen Delivery Method): room air        Physical exam:  General: No acute distress, awake and alert  Eyes: moist conjunctivae, see below for CNs  Neck: FROM  Cardiovascular: Regular rate and rhythm  Pulmonary: No use of accessory muscles  Extremities: No edema    Neurologic:  -Mental status: Awake, alert, oriented to person, place, and time. Speech is fluent with intact naming, repetition, and comprehension, Mild dysarthria. Recent and remote memory intact. Follows commands. Attention/concentration intact. Fund of knowledge appropriate.  -Cranial nerves:   III, IV, VI: Extraocular movements are intact without nystagmus. Pupils equally round and reactive to light  V:  Facial sensation V1-V3 equal and intact   VII: Left facial droop   Motor: Normal bulk and tone. Left upper extremity drift with no pronation observed. Left upper extremity 4/5, Left lower extremity 3/5. Right upper and lower extremity 5/5.   Sensation: Intact to light touch bilaterally. No neglect or extinction on double simultaneous testing.  Coordination: No dysmetria on finger-to-nose bilaterally    LABS:                        11.6   5.57  )-----------( 122      ( 25 Mar 2024 05:09 )             34.0     03-25    137  |  106  |  8   ----------------------------<  105<H>  3.8   |  22  |  0.69    Ca    8.6      25 Mar 2024 05:09  Phos  3.1     03-25  Mg     1.9     03-25    TPro  5.6<L>  /  Alb  3.8  /  TBili  0.8  /  DBili  x   /  AST  17  /  ALT  10  /  AlkPhos  92  03-24    PT/INR - ( 24 Mar 2024 16:33 )   PT: 11.5 sec;   INR: 1.01          PTT - ( 24 Mar 2024 16:33 )  PTT:24.9 sec  Urinalysis Basic - ( 25 Mar 2024 05:09 )    Color: x / Appearance: x / SG: x / pH: x  Gluc: 105 mg/dL / Ketone: x  / Bili: x / Urobili: x   Blood: x / Protein: x / Nitrite: x   Leuk Esterase: x / RBC: x / WBC x   Sq Epi: x / Non Sq Epi: x / Bacteria: x        RADIOLOGY & ADDITIONAL TESTS:  Reviewed.   3/25 CTH: Right acute MCA infarct with no acute hemorrhage.

## 2024-03-25 NOTE — OCCUPATIONAL THERAPY INITIAL EVALUATION ADULT - GENERAL OBSERVATIONS, REHAB EVAL
PT Allis present. Patient daughter and wife present. Patient A&Ox4, agreeable and tolerated session fairly. Patient received semisupine in bed +tele, +heplock IV, +b/ SCD's, +[place] cath, room air, NAD.

## 2024-03-25 NOTE — PHYSICAL THERAPY INITIAL EVALUATION ADULT - FINE MOTOR COORDINATION, RIGHT HAND THUMB/FINGER OPPOSITION SKILLS, OT EVAL
Platelets 89 on admission, baseline from prior admission 60-70's  - monitor CBC  - Transfuse for plts <50k + bleeding, <20k + fever, or <10k   - Will proceed to hold AC in the setting of possible bone marrow biopsy on Monday normal performance

## 2024-03-25 NOTE — PROGRESS NOTE ADULT - SUBJECTIVE AND OBJECTIVE BOX
HPI:  HPI: 73y Male with PMHx of HTN, BPH, HLD, coming in with left sided weakness. Pt states that he woke up at 5 am and went for a run. He came back and went to shower. He said he slipped and hit his head on the shower door.  Wife states that he heard a thud from the shower around 7:45AM. She went in and noticed his left side was weak and his head was against the shower door. He had not fallen to the ground. She called her daughter who came by, and they also called EMS.  Pt states that he slipped and fell, does not think he has any actual weakness. He wonders how his dog is doing. 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 NIHSS 17. Pt states he has a headache, 9/10 but says it isn't that bad and does not need medication for the headache.    Case discussed with Dr. Narinder Sherwood who discussed with Dr. Schreiber prior to urgent intervention. Risk and benefits of tenecteplase were discussed with patient/family member including risk of symptomatic ICH/death. Decision was made that benefits outweighed risks and tenecteplase was administered. Thrombectomy was discussed between Neurology and Neuro IR Attendings and decision was made to proceed with thrombectomy. Tenecteplase given at 8:46AM.     Pt was seen about 30 minutes after TNK was given, NIHSS improved to 15. Headache similar to how it was prior to tenecteplase administration.  (24 Mar 2024 12:17)    OVERNIGHT EVENTS: [facility]    Hospital Course:   3/24: R M1 occlusion, s/p TNK, s/p thrombectomy TICI 0 to 2B. NIH 17. Improvement of gaze preference and left sided weakness. Right groin.   3/25: POD 1 R M1 thrombectomy. s/p 1 L bolus fo BP augmentation.     Vital Signs Last 24 Hrs  T(C): 36.7 (24 Mar 2024 21:58), Max: 37.7 (24 Mar 2024 18:11)  T(F): 98 (24 Mar 2024 21:58), Max: 99.9 (24 Mar 2024 18:11)  HR: 63 (24 Mar 2024 22:00) (63 - 94)  BP: 109/65 (24 Mar 2024 22:00) (101/63 - 158/54)  BP(mean): 82 (24 Mar 2024 22:00) (76 - 90)  RR: 20 (24 Mar 2024 22:00) (11 - 25)  SpO2: 98% (24 Mar 2024 22:00) (94% - 100%)    Parameters below as of 24 Mar 2024 22:00  Patient On (Oxygen Delivery Method): room air        I&O's Summary    24 Mar 2024 07:01  -  25 Mar 2024 00:01  --------------------------------------------------------  IN: 1675 mL / OUT: 800 mL / NET: 875 mL        PHYSICAL EXAM:  General: patient seen laying supine in bed in NAD  Neuro: AAOx3, FC, OE spontaneously, dysarthric speech, CNII-XI grossly intact, L facial asymmetry, +L pronator drift,    strength 4+/5 LUE, 5/5 RUE and b/l LE, sensation intact to light touch throughout  HEENT: PERRL, EOMI  Neck: supple  Cardiac: RRR, S1S2  Pulmonary: chest rise symmetric  Abdomen: soft, nontender, nondistended  Ext: perfusing well  Skin: warm, dry  Wound: R groin incision c/d/i +steri-strips        LABS:                        12.3   5.80  )-----------( 147      ( 24 Mar 2024 16:28 )             35.9     03-24    136  |  105  |  9   ----------------------------<  104<H>  4.1   |  22  |  0.65    Ca    8.1<L>      24 Mar 2024 16:28  Phos  4.1     03-24  Mg     1.9     03-24    TPro  5.6<L>  /  Alb  3.8  /  TBili  0.8  /  DBili  x   /  AST  17  /  ALT  10  /  AlkPhos  92  03-24    PT/INR - ( 24 Mar 2024 16:33 )   PT: 11.5 sec;   INR: 1.01          PTT - ( 24 Mar 2024 16:33 )  PTT:24.9 sec  Urinalysis Basic - ( 24 Mar 2024 16:28 )    Color: x / Appearance: x / SG: x / pH: x  Gluc: 104 mg/dL / Ketone: x  / Bili: x / Urobili: x   Blood: x / Protein: x / Nitrite: x   Leuk Esterase: x / RBC: x / WBC x   Sq Epi: x / Non Sq Epi: x / Bacteria: x          CAPILLARY BLOOD GLUCOSE  106 (24 Mar 2024 08:48)      POCT Blood Glucose.: 95 mg/dL (24 Mar 2024 21:22)  POCT Blood Glucose.: 106 mg/dL (24 Mar 2024 08:21)      Drug Levels: [] N/A    CSF Analysis: [] N/A      Allergies    Allergy Status Unknown    Intolerances      MEDICATIONS:  Antibiotics:    Neuro:  acetaminophen     Tablet .. 650 milliGRAM(s) Oral every 6 hours PRN  ondansetron Injectable 4 milliGRAM(s) IV Push every 8 hours PRN  traMADol 25 milliGRAM(s) Oral every 6 hours PRN  traMADol 50 milliGRAM(s) Oral every 6 hours PRN    Anticoagulation:    OTHER:  atorvastatin 80 milliGRAM(s) Oral at bedtime  chlorhexidine 2% Cloths 1 Application(s) Topical daily  finasteride 5 milliGRAM(s) Oral daily  insulin lispro (ADMELOG) corrective regimen sliding scale   SubCutaneous Before meals and at bedtime  polyethylene glycol 3350 17 Gram(s) Oral daily  senna 2 Tablet(s) Oral at bedtime  tamsulosin 0.4 milliGRAM(s) Oral at bedtime    IVF:  sodium chloride 0.9%. 1000 milliLiter(s) IV Continuous <Continuous>    CULTURES:    RADIOLOGY & ADDITIONAL TESTS:  < from: CT Brain Stroke Protocol (03.24.24 @ 08:30) >  IMPRESSION:  Motion limited study.  No gross acute intracranial hemorrhage or mass effect.  Hyperdense right M1 MCA, concerning for thrombosis.    Results were discussed with Dr. Wheeler by Dr. Marx at 8:38 AM on   3/24/2024 with read back followed.    --- End of Report ---    < end of copied text >  < from: CT Angio Neck Stroke Protocol w/ IV Cont (03.24.24 @ 08:55) >  IMPRESSION:    CT PERFUSION: Small core infarct, large ischemic penumbra in the right   MCA territory.    CTA COW: Abrupt right M1 occlusion with M2 reconstitution.    CTA NECK: Patent, ECAs, ICAs, no  hemodynamically significant stenosis at    ICA origins by NASCET criteria.  Bilateral vertebral arteries are patent without flow limiting stenosis.    --- End of Report ---    < end of copied text >      ASSESSMENT:  74 y/o male with h/o HTN, BPH, HLD, mitral valve repair presents to ED with left sided weakness, L facial droop, and L gaze preference, s/p TNK (3/24/24). Stroke code imaging demonstrates right M1 occlusion, now s/p thrombectomy R M1/2, TICI 0 to 2B (3/24/24).       STROKE    Handoff    MEWS Score    Hypertension    History of BPH    CVA (cerebrovascular accident)    Hyperlipidemia    Cerebrovascular accident (CVA)    CVA (cerebrovascular accident)    Stroke    CVA (cerebrovascular accident)    Hypertension    Hyperlipidemia    Angiogram, cerebral, with mechanical thrombectomy    AMS    CVA (cerebrovascular accident)    Hypertension    Hyperlipidemia    Room Service Assist    [email or web address]        PLAN:  NEURO  - Neuro/vitals q 1   - CTH 3/24/24 9 AM   - Bedrest x 8 hrs   - Pain control: tylenol, tramadol prn  - Stroke/neuro following, stroke core measures ordered     CARDIO  - -160   - Echo with bubble     PULM  - RA, encourage IS    GI   - Soft and bite sized diet d/t dysarthria   - Bowel regimen     RENAL   - NS @ 75   - BPH: continue home flomax/finasteride, voiding     ENDO   - ISS  - Follow up A1c, TSH, Lipid panel     HEME   - SCDs, hold SQL in setting of TNK    ID   - afebrile     DISPO  - ICU status, full code   - Hold PT/OT     D/W Dr. Rubio and Dr. Cast     Assessment:  Present when checked    []  GCS  E   V  M     Heart Failure: []Acute, [] acute on chronic , []chronic  Heart Failure:  [] Diastolic (HFpEF), [] Systolic (HFrEF), []Combined (HFpEF and HFrEF), [] RHF, [] Pulm HTN, [] Other    [] STACY, [] ATN, [] AIN, [] other  [] CKD1, [] CKD2, [] CKD 3, [] CKD 4, [] CKD 5, []ESRD    Encephalopathy: [] Metabolic, [] Hepatic, [] toxic, [] Neurological, [] Other    Abnormal Nurtitional Status: [] malnurtition (see nutrition note), [ ]underweight: BMI < 19, [] morbid obesity: BMI >40, [] Cachexia    [] Sepsis  [] hypovolemic shock,[] cardiogenic shock, [] hemorrhagic shock, [] neuogenic shock  [] Acute Respiratory Failure  []Cerebral edema, [] Brain compression/ herniation,   [] Functional quadriplegia  [] Acute blood loss anemia

## 2024-03-25 NOTE — OCCUPATIONAL THERAPY INITIAL EVALUATION ADULT - IMPAIRED TRANSFERS: SIT/STAND, REHAB EVAL
ataxic/impaired balance/impaired coordination/decreased flexibility/abnormal muscle tone/impaired postural control/scissoring/decreased strength

## 2024-03-25 NOTE — OCCUPATIONAL THERAPY INITIAL EVALUATION ADULT - DIAGNOSIS, OT EVAL
MRS 4. Patient POD #1 s/p R M1 thrombectomy, presents slightly lethargic, with decreased LUE, L lateral truncal and head/neck lean, L facial droop, L visual deficits, decreased balance, postural control, activity tolerance impacting independence with functional activities and mobility. MRS 4. Patient POD #1 s/p R M1 thrombectomy, presents slightly lethargic, dysarthria, with decreased LUE, L lateral truncal and head/neck lean, L facial droop, L visual deficits, decreased balance, postural control, activity tolerance impacting independence with functional activities and mobility.

## 2024-03-25 NOTE — OCCUPATIONAL THERAPY INITIAL EVALUATION ADULT - SENSORY TESTS
L visual deficits- difficult with full assessment 2/2 patient lethargic requiring max verbal cues for instructions, H and Quad test in R eye intact- absent in L eye, Eye movements are intact without nystagmus, Pupils equally round and reactive to light, facial sensation V1-V3 equal and intact, Left facial droop, tongue protrudes midlines, shoulder shrugs intact, puffing cheeks intact,  hearing bilaterally with rubs intact

## 2024-03-25 NOTE — PHYSICAL THERAPY INITIAL EVALUATION ADULT - GENERAL OBSERVATIONS, REHAB EVAL
Received supine complaints of headache 10/10 +EKG, [place], IV hep, jose. Left as found +RN geraldine aware , call palacio

## 2024-03-25 NOTE — PROGRESS NOTE ADULT - SUBJECTIVE AND OBJECTIVE BOX
***********************************************  ADULT NSICU PROGRESS NOTE  CARO CHAVARRIA [id number] [facility] 08EA [id number]  ***********************************************    24H INTERVAL EVENTS:    ROS: negative except per mentioned above in 24h interval events.      HOSPITAL COURSE CARRIED FORWARD:    VITALS:    ICU Vital Signs Last 24 Hrs  T(C): 36.6 (25 Mar 2024 04:30), Max: 37.7 (24 Mar 2024 18:11)  T(F): 97.9 (25 Mar 2024 04:30), Max: 99.9 (24 Mar 2024 18:11)  HR: 57 (25 Mar 2024 07:00) (57 - 94)  BP: 121/74 (25 Mar 2024 07:00) (101/63 - 158/54)  BP(mean): 90 (25 Mar 2024 07:00) (76 - 91)  ABP: --  ABP(mean): --  RR: 15 (25 Mar 2024 07:00) (11 - 25)  SpO2: 97% (25 Mar 2024 07:00) (94% - 100%)    O2 Parameters below as of 25 Mar 2024 08:00  Patient On (Oxygen Delivery Method): room air                I&O's Summary    24 Mar 2024 07:01  -  25 Mar 2024 07:00  --------------------------------------------------------  IN: 2425 mL / OUT: 1550 mL / NET: 875 mL    25 Mar 2024 07:01  -  25 Mar 2024 07:44  --------------------------------------------------------  IN: 75 mL / OUT: 0 mL / NET: 75 mL        EXAM:     Rienzi Coma Scale:     General: normocephalic, atraumatic, laying in bed, in no distress  Neuro     MS: A/Ox3, cooperative, normal attention, no neglect, comprehension intact, speech with preserved fluency, naming, and repetition    CN: PERRL, VF FTC, EOMI and no ptosis bilaterally, sensation intact to crude touch V1-V3, face symmetric, hearing grossly intact    Mot: bulk normal, tone normal, power 5/5 in bilateral upper and lower proximal extremities    Sens: intact to crude touch in bilateral upper and lower extremities    Reflexes: deferred    Coord: no dysmetria or ataxia on finger to nose/heel to shin, respectively, no focal bradykinetic movements    Gait: deferred  Chest: nonlabored respirations, no adventitious lung sounds bilaterally, heart regular rate/rhythm, present S1/S2, no murmurs or rubs  Abdomen: nondistended, soft and nontender without peritoneal signs, normoactive bowel sounds  Extremities: no clubbing, well-perfused, no edema    LABORATORY DATA:                            11.6   5.57  )-----------( 122      ( 25 Mar 2024 05:09 )             34.0     03-25    137  |  106  |  8   ----------------------------<  105<H>  3.8   |  22  |  0.69    Ca    8.6      25 Mar 2024 05:09  Phos  3.1     03-25  Mg     1.9     03-25    TPro  5.6<L>  /  Alb  3.8  /  TBili  0.8  /  DBili  x   /  AST  17  /  ALT  10  /  AlkPhos  92  03-24    LIVER FUNCTIONS - ( 24 Mar 2024 16:28 )  Alb: 3.8 g/dL / Pro: 5.6 g/dL / ALK PHOS: 92 U/L / ALT: 10 U/L / AST: 17 U/L / GGT: x           PT/INR - ( 24 Mar 2024 16:33 )   PT: 11.5 sec;   INR: 1.01          PTT - ( 24 Mar 2024 16:33 )  PTT:24.9 sec    IMAGING DATA:    CARDIOLOGY DATA:    MICROBIOLOGY DATA:        MEDICATIONS  (STANDING):  atorvastatin 80 milliGRAM(s) Oral at bedtime  chlorhexidine 2% Cloths 1 Application(s) Topical daily  finasteride 5 milliGRAM(s) Oral daily  insulin lispro (ADMELOG) corrective regimen sliding scale   SubCutaneous Before meals and at bedtime  polyethylene glycol 3350 17 Gram(s) Oral daily  senna 2 Tablet(s) Oral at bedtime  sodium chloride 0.9%. 1000 milliLiter(s) (75 mL/Hr) IV Continuous <Continuous>  tamsulosin 0.4 milliGRAM(s) Oral at bedtime    MEDICATIONS  (PRN):  acetaminophen     Tablet .. 650 milliGRAM(s) Oral every 6 hours PRN Temp greater or equal to 38C (100.4F), Mild Pain (1 - 3)  ondansetron Injectable 4 milliGRAM(s) IV Push every 8 hours PRN Nausea and/or Vomiting  traMADol 25 milliGRAM(s) Oral every 6 hours PRN Moderate Pain (4 - 6)  traMADol 50 milliGRAM(s) Oral every 6 hours PRN Severe Pain (7 - 10)      ***********************************************  ASSESSMENT AND PLAN  ***********************************************    This is a case of ***    NEURO  - Admit NSICU, Q1h neuro checks / Q1h vital signs    PULM  - SpO2 goal > 92%, supplemental O2 and pulm toileting as needed    CARDIO  - BP goal:     GI  - Diet:   - Stress ulcer prophylaxis:     /RENAL  - Monitor UOP/volume status, BUN/SCr    HEME  - Maintain Hb > 7.0, PLT > ***    ID  - Monitor for infectious s/s, fever curve, leukocytosis    ENDO    03-25-24 @ 07:44       ***********************************************  ADULT NSICU PROGRESS NOTE  CARO CHAVARRIA [id number] [facility] 08EA [id number]  ***********************************************    24H INTERVAL EVENTS: no acute overnight events    ROS: negative except per mentioned above in 24h interval events.      HOSPITAL COURSE CARRIED FORWARD:    VITALS:    ICU Vital Signs Last 24 Hrs  T(C): 36.6 (25 Mar 2024 04:30), Max: 37.7 (24 Mar 2024 18:11)  T(F): 97.9 (25 Mar 2024 04:30), Max: 99.9 (24 Mar 2024 18:11)  HR: 57 (25 Mar 2024 07:00) (57 - 94)  BP: 121/74 (25 Mar 2024 07:00) (101/63 - 158/54)  BP(mean): 90 (25 Mar 2024 07:00) (76 - 91)  ABP: --  ABP(mean): --  RR: 15 (25 Mar 2024 07:00) (11 - 25)  SpO2: 97% (25 Mar 2024 07:00) (94% - 100%)    O2 Parameters below as of 25 Mar 2024 08:00  Patient On (Oxygen Delivery Method): room air                I&O's Summary    24 Mar 2024 07:01  -  25 Mar 2024 07:00  --------------------------------------------------------  IN: 2425 mL / OUT: 1550 mL / NET: 875 mL    25 Mar 2024 07:01  -  25 Mar 2024 07:44  --------------------------------------------------------  IN: 75 mL / OUT: 0 mL / NET: 75 mL        EXAM:     [place] Coma Scale: 15    General: normocephalic, atraumatic, laying in bed, in no distress  Neuro     MS: A/Ox3, cooperative, normal attention, (+)L. sided neglect, comprehension intact, speech with preserved fluency, naming, and repetition    CN: PERRL, EOMI and no ptosis bilaterally, sensation intact to crude touch V1-V3, face symmetric, hearing grossly intact, (+)dysarthric    Mot: bulk normal, tone normal, power 5/5 in bilateral upper and lower proximal extremities  Chest: nonlabored respirations, no adventitious lung sounds bilaterally, heart regular rate/rhythm, present S1/S2, no murmurs or rubs  Abdomen: nondistended, soft and nontender without peritoneal signs, normoactive bowel sounds  Extremities: no clubbing, well-perfused, no edema    LABORATORY DATA:                            11.6   5.57  )-----------( 122      ( 25 Mar 2024 05:09 )             34.0     03-25    137  |  106  |  8   ----------------------------<  105<H>  3.8   |  22  |  0.69    Ca    8.6      25 Mar 2024 05:09  Phos  3.1     03-25  Mg     1.9     03-25    TPro  5.6<L>  /  Alb  3.8  /  TBili  0.8  /  DBili  x   /  AST  17  /  ALT  10  /  AlkPhos  92  03-24    LIVER FUNCTIONS - ( 24 Mar 2024 16:28 )  Alb: 3.8 g/dL / Pro: 5.6 g/dL / ALK PHOS: 92 U/L / ALT: 10 U/L / AST: 17 U/L / GGT: x           PT/INR - ( 24 Mar 2024 16:33 )   PT: 11.5 sec;   INR: 1.01          PTT - ( 24 Mar 2024 16:33 )  PTT:24.9 sec    IMAGING DATA:    CARDIOLOGY DATA:    MICROBIOLOGY DATA:        MEDICATIONS  (STANDING):  atorvastatin 80 milliGRAM(s) Oral at bedtime  chlorhexidine 2% Cloths 1 Application(s) Topical daily  finasteride 5 milliGRAM(s) Oral daily  insulin lispro (ADMELOG) corrective regimen sliding scale   SubCutaneous Before meals and at bedtime  polyethylene glycol 3350 17 Gram(s) Oral daily  senna 2 Tablet(s) Oral at bedtime  sodium chloride 0.9%. 1000 milliLiter(s) (75 mL/Hr) IV Continuous <Continuous>  tamsulosin 0.4 milliGRAM(s) Oral at bedtime    MEDICATIONS  (PRN):  acetaminophen     Tablet .. 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 M1 thrombectomy, 3/24/24  #History of HTN, BPH, HLD    NEURO - admit NSICU, Q1h neuro checks / Q1h vital signs, 24h stability scan today, MRI brain w/o shabbir, stroke prevention/core metrics pending 24h scan today, PT/OT  PULM - SpO2 goal > 92%, supplemental O2 and pulm toileting as needed  CARDIO - BP goal < 160, TTE w/ bubble  GI - bowel regimen, stool count, diet: bite size diet  /RENAL - monitor UOP/volume status, BUN/SCr  HEME - maintain Hb > 7.0, PLT > 10/20/50/100k depending on clinical scenario, pending VTE chemoppx should 24h scan show no bleeding  ID - monitor for infectious s/s, fever curve, leukocytosis  ENDO - SGlu goal < 200    ICU stepdown Checklist:    [X] hemodynamically stable – VS WNL and stable x 24hours, UO adequate  [X] if  previously on HDA - off pressors x 24h with stable neuro exam   [X] no new symptoms x 24h (i.e. new fever, new-onset nausea/vomiting)  [X] stable labs: (i.e. WBC not rising, sodium not dropping)  [X] patient not at high risk for aspiration, if high risk then:                  [ ] should have definitive plans for trach/PEG (alternative option is to discharge from ICU to facilty)                  [ ] stepdown to bed close to nurse’s station  [X] low suctioning requirements (i.e. q4h or less)  [X] sign-off from primary RN*  [X] drains do not require ICU level of care  [X] if patient previously agitated or with behavioral issues – controlled  [X] pain controlled

## 2024-03-25 NOTE — OCCUPATIONAL THERAPY INITIAL EVALUATION ADULT - PERTINENT HX OF CURRENT PROBLEM, REHAB EVAL
73y Male with PMHx of HTN, BPH, HLD, coming in with left sided weakness. Pt states that he woke up at 5 am and went for a run. He came back and went to shower. He said he slipped and hit his head on the shower door. Wife states that he heard a thud from the shower around 7:45AM. She went in and noticed his left side was weak and his head was against the shower door. He had not fallen to the ground. She called her daughter who came by, and they also called EMS. Pt states that he slipped and fell, does not think he has any actual weakness. He wonders how his dog is doing. 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 NIHSS 17. Pt states he has a headache, 9/10 but says it isn't that bad and does not need medication for the headache.

## 2024-03-25 NOTE — OCCUPATIONAL THERAPY INITIAL EVALUATION ADULT - NS ASR FOLLOW COMMAND OT EVAL
Max verbal/tactile cues for initiation and positioning/100% of the time/able to follow single-step instructions

## 2024-03-25 NOTE — PHYSICAL THERAPY INITIAL EVALUATION ADULT - PERTINENT HX OF CURRENT PROBLEM, REHAB EVAL
73M presents to ED with left sided weakness, L facial droop, and L gaze preference, s/p TNK (3/24/24). Stroke code imaging demonstrates right M1 occlusion, now s/p thrombectomy R M1/2, TICI 0 to 2B (3/24/24).

## 2024-03-25 NOTE — OCCUPATIONAL THERAPY INITIAL EVALUATION ADULT - ADDITIONAL COMMENTS
Patient reports living with his wife in an elevator access apartment building with no MANASA. Patient states he was independent with all ADL's, IADL's and functional mobility with no AD prior to admission. Patient is R hand dominant and wears glasses for reading. Patient owns a walk in shower.

## 2024-03-25 NOTE — PROGRESS NOTE ADULT - ASSESSMENT
Assessment     72 y/o M w/PMHX of HTN, BPH, HLD, mitral valve repair present to ED with left sided weakness, left facial droop, and left gaze preference, s/p TNK (3/24/24). Stroke code imaging demonstrates right M1 occlusion, now s/p thrombectomy Right M1/2, TICI 0 to 2B (3/24/24).     Neuro  #CVA workup  - continue aspirin 81mg and plavix 75mg daily  - continue atorvastatin 80mg daily  - q4hr stroke neuro checks and vitals  - obtain MRI Brain without contrast  - Stroke Code HCT Results:   - Stroke Code CTA Results:  - Stroke education    Cards  #HTN  - permissive hypertension, Goal -180  - hold home blood pressure medication for now  - obtain TTE with bubble  - Stroke Code EKG Results:    #HLD  - high dose statin as above in CVA  - LDL results:     Pulm  - call provider if SPO2 < 94%    GI  #Nutrition/Fluids/Electrolytes   - replete K<4 and Mg <2  - Diet:  - IVF:     Renal  - monitor and trend Cr    Infectious Disease  - Stroke Code CXR results:   - afebrile on admission, no leukocytosis    Endocrine  #DM  - A1C results:   - ISS    - TSH results:    DVT Prophylaxis  - lovenox sq for DVT prophylaxis   - SCDs for DVT prophylaxis       IDR Goals: Goals reviewed at interdisciplinary rounds with case management, social work, physical therapy, occupational therapy, and speech language pathology.   Please see specific therapy  notes for in depth goals.  Dispo: **********(Acute rehab - can tolerate 3 hours of therapy)     Discussed daily hospital plans and goals with patient and family at bedside. (Called and updated family.)    Discussed with Neurology Attending  Assessment     72 y/o M w/PMHX of HTN, BPH, HLD, mitral valve repair present to ED with left sided weakness, left facial droop, and left gaze preference, s/p TNK (3/24/24). Initial NIHSS 17 after TNK was administered, the score was reduced to 15. Stroke code imaging demonstrates right M1 occlusion, now s/p thrombectomy Right M1/2, TICI 0 to 2B (3/24/24). 24-hour CTH post-TNK/thrombectomy demonstrated acute Right MCA infarct, consistent with initial CTH. NIHSS today (3/25/24) has improved to 3.     Neuro  #CVA workup  - Started on aspirin 81mg and plavix 75mg daily  - continue atorvastatin 80mg daily  - q4hr stroke neuro checks and vitals  - obtain MRI Brain without contrast  - Stroke Code HCT Results: No gross acute intracranial hemorrhage or mass effect.  Hyperdense right M1 MCA, concerning for thrombosis.  - Stroke Code CTA Results:   CT PERFUSION: Small core infarct, large ischemic penumbra in the right   MCA territory.  CTA COW: Abrupt right M1 occlusion with M2 reconstitution.  CTA NECK: Patent, ECAs, ICAs, no  hemodynamically significant stenosis at ICA origins by NASCET criteria.  Bilateral vertebral arteries are patent without flow limiting stenosis.  - Stroke education    Cards  #HTN  - permissive hypertension, Goal -180  - hold home blood pressure medication for now  - obtain DEBBIE  - Stroke Code EKG Results: *****    #HLD  - high dose statin as above in CVA  - LDL results: 59 mg/dL    Pulm  - call provider if SPO2 < 94%    GI  #Nutrition/Fluids/Electrolytes   - replete K<4 and Mg <2  - Diet: *****  - IVF: ******    Renal  - monitor and trend Cr    Infectious Disease  - afebrile on admission, no leukocytosis    Endocrine  #DM  - A1C results:5.5  - ISS    - TSH results: 0.603 uIU/mL         DVT Prophylaxis  - lovenox sq for DVT prophylaxis   - SCDs for DVT prophylaxis       IDR Goals: Goals reviewed at interdisciplinary rounds with case management, social work, physical therapy, occupational therapy, and speech language pathology.   Please see specific therapy  notes for in depth goals.  Dispo: Pending      Discussed daily hospital plans and goals with patient and family at bedside. (Called and updated family.)    Discussed with Neurology Attending Dr. Schreiber and Neurology Fellow Dr. Sherwood  Assessment     74 y/o M w/PMHX of HTN, BPH, HLD, mitral valve repair present to ED with left sided weakness, left facial droop, and left gaze preference, s/p TNK (3/24/24). Initial NIHSS 17 after TNK was administered, the score was reduced to 15. Stroke code imaging demonstrates right M1 occlusion, now s/p thrombectomy Right M1/2, TICI 0 to 2B (3/24/24). 24-hour CTH post-TNK/thrombectomy demonstrated acute Right MCA infarct, consistent with initial CTH. NIHSS today (3/25/24) has improved to 3.     Neuro  #CVA workup  - Started on aspirin 81mg and plavix 75mg daily  - continue atorvastatin 80mg daily  - q4hr stroke neuro checks and vitals  - obtain MRI Brain without contrast  - Stroke Code HCT Results: No gross acute intracranial hemorrhage or mass effect.  Hyperdense right M1 MCA, concerning for thrombosis.  - Stroke Code CTA Results:   CT PERFUSION: Small core infarct, large ischemic penumbra in the right   MCA territory.  CTA COW: Abrupt right M1 occlusion with M2 reconstitution.  CTA NECK: Patent, ECAs, ICAs, no  hemodynamically significant stenosis at ICA origins by NASCET criteria.  Bilateral vertebral arteries are patent without flow limiting stenosis.  - Stroke education    Cards  #HTN  - permissive hypertension, Goal -180  - hold home blood pressure medication for now  - obtain DEBBIE  - Stroke Code EKG Results: *****    #HLD  - high dose statin as above in CVA  - LDL results: 59 mg/dL    Pulm  - call provider if SPO2 < 94%    GI  #Nutrition/Fluids/Electrolytes   - replete K<4 and Mg <2  - Diet: Soft and Bite-sized     Renal  - monitor and trend Cr    Infectious Disease  - afebrile on admission, no leukocytosis    Endocrine  #DM  - A1C results:5.5  - ISS    - TSH results: 0.603 uIU/mL         DVT Prophylaxis  - lovenox sq for DVT prophylaxis   - SCDs for DVT prophylaxis       IDR Goals: Goals reviewed at interdisciplinary rounds with case management, social work, physical therapy, occupational therapy, and speech language pathology.   Please see specific therapy  notes for in depth goals.  Dispo: Pending      Discussed daily hospital plans and goals with patient and family at bedside.  Discussed with Neurology Attending Dr. Schrebier and Neurology Fellow Dr. Sherwood  Assessment     74 y/o M w/PMHX of HTN, BPH, HLD, mitral valve repair present to ED with left sided weakness, left facial droop, and left gaze preference, s/p TNK (3/24/24). Initial NIHSS 17 after TNK was administered, the score was reduced to 15. Stroke code imaging demonstrates right M1 occlusion, now s/p thrombectomy Right M1/2, TICI 0 to 2B (3/24/24). 24-hour CTH post-TNK/thrombectomy demonstrated acute Right MCA infarct, consistent with initial CTH and no acute hemorrhage seen. NIHSS today (3/25/24) has improved to 3.     Neuro  #Right MCA infarct status post TNK and thrombectomy with Initial NIHSS 17 improved to 3.   - Started on aspirin 81mg and plavix 75mg daily  - continue atorvastatin 80mg daily  - q4hr stroke neuro checks and vitals  - obtain MRI Brain without contrast  - Stroke Code HCT Results: No gross acute intracranial hemorrhage or mass effect.  Hyperdense right M1 MCA, concerning for thrombosis.  - Stroke Code CTA Results:   CT PERFUSION: Small core infarct, large ischemic penumbra in the right   MCA territory.  CTA COW: Abrupt right M1 occlusion with M2 reconstitution.  CTA NECK: Patent, ECAs, ICAs, no  hemodynamically significant stenosis at ICA origins by NASCET criteria.  Bilateral vertebral arteries are patent without flow limiting stenosis.  - Stroke education    #Headache   -     Cards  #HTN  - permissive hypertension, Goal -180  - hold home blood pressure medication for now  - obtain DEBBIE, placed on NPO after midnight (3/26/24)     #HLD  - high dose statin as above in CVA  - LDL results: 59 mg/dL    Pulm  - call provider if SPO2 < 94%    GI  #Nutrition/Fluids/Electrolytes   - replete K<4 and Mg <2  - Diet: Soft and Bite-sized     Renal  - monitor and trend Cr    Infectious Disease  - afebrile on admission, no leukocytosis    Endocrine  #DM  - A1C results:5.5  - ISS    - TSH results: 0.603 uIU/mL         DVT Prophylaxis  - lovenox sq for DVT prophylaxis   - SCDs for DVT prophylaxis       IDR Goals: Goals reviewed at interdisciplinary rounds with case management, social work, physical therapy, occupational therapy, and speech language pathology.   Please see specific therapy  notes for in depth goals.  Dispo: Pending      Discussed daily hospital plans and goals with patient and family at bedside.  Discussed with Neurology Attending Dr. Schreiber and Neurology Fellow Dr. Sherwood  Assessment     74 y/o M w/PMHX of HTN, BPH, HLD, mitral valve repair present to ED with left sided weakness, left facial droop, and left gaze preference, s/p TNK (3/24/24). Initial NIHSS 17 after TNK was administered, the score was reduced to 15. Stroke code imaging demonstrates right M1 occlusion, now s/p thrombectomy Right M1/2, TICI 0 to 2B (3/24/24). 24-hour CTH post-TNK/thrombectomy demonstrated acute Right MCA infarct, consistent with initial CTH and no acute hemorrhage seen. NIHSS today (3/25/24) has improved to 3.     Neuro  #Right MCA infarct status post TNK and thrombectomy with Initial NIHSS 17 improved to 3.   - Started on aspirin 81mg and plavix 75mg daily  - continue atorvastatin 80mg daily  - q4hr stroke neuro checks and vitals  - obtain MRI Brain without contrast  - Stroke Code HCT Results: No gross acute intracranial hemorrhage or mass effect.  Hyperdense right M1 MCA, concerning for thrombosis.  - Stroke Code CTA Results:   CT PERFUSION: Small core infarct, large ischemic penumbra in the right   MCA territory.  CTA COW: Abrupt right M1 occlusion with M2 reconstitution.  CTA NECK: Patent, ECAs, ICAs, no  hemodynamically significant stenosis at ICA origins by NASCET criteria.  Bilateral vertebral arteries are patent without flow limiting stenosis.  - Stroke education    #Headache   - Topamax 25 mg every 24 hours starting at 21:00  - Tramadol 25 mg q 6hrs for moderate pain prn   - Tramadol 50 mg q 6hrs for severe pain prn      Cards  #HTN  - permissive hypertension, Goal -180  - hold home blood pressure medication for now  - obtain DEBBIE, placed on NPO after midnight (3/26/24)     #HLD  - high dose statin as above in CVA  - LDL results: 59 mg/dL    Pulm  - call provider if SPO2 < 94%    GI  #Nutrition/Fluids/Electrolytes   - replete K<4 and Mg <2  - Diet: Soft and Bite-sized     Renal  - monitor and trend Cr    Infectious Disease  - afebrile on admission, no leukocytosis    Endocrine  #DM  - A1C results:5.5  - ISS    - TSH results: 0.603 uIU/mL         DVT Prophylaxis  - lovenox sq for DVT prophylaxis   - SCDs for DVT prophylaxis       IDR Goals: Goals reviewed at interdisciplinary rounds with case management, social work, physical therapy, occupational therapy, and speech language pathology.   Please see specific therapy  notes for in depth goals.  Dispo: Pending      Discussed daily hospital plans and goals with patient and family at bedside.  Discussed with Neurology Attending Dr. Schreiber and Neurology Fellow Dr. Sherwood  Assessment     74 y/o M w/PMHX of HTN, BPH, HLD, mitral valve repair present to ED with left sided weakness, left facial droop, and left gaze preference, s/p TNK (3/24/24). Initial NIHSS 17 after TNK was administered, the score was reduced to 15. Stroke code imaging demonstrates right M1 occlusion, now s/p thrombectomy Right M1/2, TICI 0 to 2B (3/24/24). 24-hour CTH post-TNK/thrombectomy demonstrated acute Right MCA infarct, consistent with initial CTH and no acute hemorrhage seen. NIHSS today (3/25/24) has improved to 3.     Neuro  #Right MCA infarct status post TNK and thrombectomy with Initial NIHSS 17 improved to 3.   - Started on aspirin 81mg and plavix 75mg daily  - continue atorvastatin 80mg daily  - q4hr stroke neuro checks and vitals  - Stroke Code HCT Results: No gross acute intracranial hemorrhage or mass effect.  Hyperdense right M1 MCA, concerning for thrombosis.  - Stroke Code CTA Results:   CT PERFUSION: Small core infarct, large ischemic penumbra in the right   MCA territory.  CTA COW: Abrupt right M1 occlusion with M2 reconstitution.  CTA NECK: Patent, ECAs, ICAs, no  hemodynamically significant stenosis at ICA origins by NASCET criteria.  Bilateral vertebral arteries are patent without flow limiting stenosis.  - Stroke education    #Headache   - Topamax 25 mg every 24 hours starting at 21:00  - Tramadol 25 mg q 6hrs for moderate pain prn   - Tramadol 50 mg q 6hrs for severe pain prn      Cards  #HTN  - permissive hypertension, Goal -180  - hold home blood pressure medication for now  - obtain DEBBIE, placed on NPO after midnight (3/26/24)     #HLD  - high dose statin as above in CVA  - LDL results: 59 mg/dL    Pulm  - call provider if SPO2 < 94%    GI  #Nutrition/Fluids/Electrolytes   - replete K<4 and Mg <2  - Diet: Soft and Bite-sized     Renal  - monitor and trend Cr    Infectious Disease  - afebrile on admission, no leukocytosis    Endocrine  #DM  - A1C results:5.5  - ISS    - TSH results: 0.603 uIU/mL         DVT Prophylaxis  - lovenox sq for DVT prophylaxis   - SCDs for DVT prophylaxis       IDR Goals: Goals reviewed at interdisciplinary rounds with case management, social work, physical therapy, occupational therapy, and speech language pathology.   Please see specific therapy  notes for in depth goals.  Dispo: Pending      Discussed daily hospital plans and goals with patient and family at bedside.  Discussed with Neurology Attending Dr. Schreiber and Neurology Fellow Dr. Sherwood

## 2024-03-25 NOTE — PHYSICAL THERAPY INITIAL EVALUATION ADULT - IMPAIRMENTS FOUND, PT EVAL
aerobic capacity/endurance/arousal, attention, and cognition/cognitive impairment/decreased midline orientation/gait, locomotion, and balance/muscle strength/neuromotor development and sensory integration/poor safety awareness/posture

## 2024-03-25 NOTE — PHYSICAL THERAPY INITIAL EVALUATION ADULT - MODALITIES TREATMENT COMMENTS
II: L eye visual fields not intact, unable to assess; R eye visual fields full to confrontation; III, IV, VI: Extraocular movements are intact without nystagmus; VII: Face drooped L smile;  XII: Tongue protrudes midline

## 2024-03-25 NOTE — OCCUPATIONAL THERAPY INITIAL EVALUATION ADULT - MODIFIED CLINICAL TEST OF SENSORY INTEGRATION IN BALANCE TEST
Patient functionally side stepped to the R and L x 5 each with b/l hand held assist and trunk support, decreased weight shifting onto weaker LLE, deficits with step length and L lateral lean requiring mod verbal/tactile cues throughout for proper positioning and safety.

## 2024-03-25 NOTE — OCCUPATIONAL THERAPY INITIAL EVALUATION ADULT - MANUAL MUSCLE TESTING RESULTS, REHAB EVAL
LUE shoulder/elbow flexion 3-/5, supination/pronation/wrist flexion 3/5, hand grasp 4/5, RUE/BLE 5/5

## 2024-03-26 ENCOUNTER — RESULT REVIEW (OUTPATIENT)
Age: 74
End: 2024-03-26

## 2024-03-26 ENCOUNTER — TRANSCRIPTION ENCOUNTER (OUTPATIENT)
Age: 74
End: 2024-03-26

## 2024-03-26 DIAGNOSIS — D69.6 THROMBOCYTOPENIA, UNSPECIFIED: ICD-10-CM

## 2024-03-26 DIAGNOSIS — N40.0 BENIGN PROSTATIC HYPERPLASIA WITHOUT LOWER URINARY TRACT SYMPTOMS: ICD-10-CM

## 2024-03-26 DIAGNOSIS — R50.9 FEVER, UNSPECIFIED: ICD-10-CM

## 2024-03-26 DIAGNOSIS — I10 ESSENTIAL (PRIMARY) HYPERTENSION: ICD-10-CM

## 2024-03-26 LAB
ANION GAP SERPL CALC-SCNC: 4 MMOL/L — LOW (ref 5–17)
APPEARANCE UR: CLEAR — SIGNIFICANT CHANGE UP
BASOPHILS # BLD AUTO: 0.02 K/UL — SIGNIFICANT CHANGE UP (ref 0–0.2)
BASOPHILS NFR BLD AUTO: 0.4 % — SIGNIFICANT CHANGE UP (ref 0–2)
BILIRUB UR-MCNC: NEGATIVE — SIGNIFICANT CHANGE UP
BUN SERPL-MCNC: 8 MG/DL — SIGNIFICANT CHANGE UP (ref 7–23)
CALCIUM SERPL-MCNC: 8.8 MG/DL — SIGNIFICANT CHANGE UP (ref 8.4–10.5)
CHLORIDE SERPL-SCNC: 104 MMOL/L — SIGNIFICANT CHANGE UP (ref 96–108)
CO2 SERPL-SCNC: 28 MMOL/L — SIGNIFICANT CHANGE UP (ref 22–31)
COLOR SPEC: YELLOW — SIGNIFICANT CHANGE UP
CREAT SERPL-MCNC: 0.66 MG/DL — SIGNIFICANT CHANGE UP (ref 0.5–1.3)
DIFF PNL FLD: NEGATIVE — SIGNIFICANT CHANGE UP
EGFR: 100 ML/MIN/1.73M2 — SIGNIFICANT CHANGE UP
EOSINOPHIL # BLD AUTO: 0.01 K/UL — SIGNIFICANT CHANGE UP (ref 0–0.5)
EOSINOPHIL NFR BLD AUTO: 0.2 % — SIGNIFICANT CHANGE UP (ref 0–6)
GLUCOSE BLDC GLUCOMTR-MCNC: 107 MG/DL — HIGH (ref 70–99)
GLUCOSE BLDC GLUCOMTR-MCNC: 190 MG/DL — HIGH (ref 70–99)
GLUCOSE BLDC GLUCOMTR-MCNC: 94 MG/DL — SIGNIFICANT CHANGE UP (ref 70–99)
GLUCOSE BLDC GLUCOMTR-MCNC: 96 MG/DL — SIGNIFICANT CHANGE UP (ref 70–99)
GLUCOSE SERPL-MCNC: 91 MG/DL — SIGNIFICANT CHANGE UP (ref 70–99)
GLUCOSE UR QL: NEGATIVE MG/DL — SIGNIFICANT CHANGE UP
HCT VFR BLD CALC: 37.5 % — LOW (ref 39–50)
HGB BLD-MCNC: 12.4 G/DL — LOW (ref 13–17)
IMM GRANULOCYTES NFR BLD AUTO: 0.4 % — SIGNIFICANT CHANGE UP (ref 0–0.9)
INR BLD: 0.95 — SIGNIFICANT CHANGE UP (ref 0.85–1.18)
KETONES UR-MCNC: 40 MG/DL
LEUKOCYTE ESTERASE UR-ACNC: NEGATIVE — SIGNIFICANT CHANGE UP
LYMPHOCYTES # BLD AUTO: 0.98 K/UL — LOW (ref 1–3.3)
LYMPHOCYTES # BLD AUTO: 17.6 % — SIGNIFICANT CHANGE UP (ref 13–44)
MAGNESIUM SERPL-MCNC: 1.8 MG/DL — SIGNIFICANT CHANGE UP (ref 1.6–2.6)
MCHC RBC-ENTMCNC: 33.1 GM/DL — SIGNIFICANT CHANGE UP (ref 32–36)
MCHC RBC-ENTMCNC: 33.7 PG — SIGNIFICANT CHANGE UP (ref 27–34)
MCV RBC AUTO: 101.9 FL — HIGH (ref 80–100)
MONOCYTES # BLD AUTO: 0.64 K/UL — SIGNIFICANT CHANGE UP (ref 0–0.9)
MONOCYTES NFR BLD AUTO: 11.5 % — SIGNIFICANT CHANGE UP (ref 2–14)
NEUTROPHILS # BLD AUTO: 3.89 K/UL — SIGNIFICANT CHANGE UP (ref 1.8–7.4)
NEUTROPHILS NFR BLD AUTO: 69.9 % — SIGNIFICANT CHANGE UP (ref 43–77)
NITRITE UR-MCNC: NEGATIVE — SIGNIFICANT CHANGE UP
NRBC # BLD: 0 /100 WBCS — SIGNIFICANT CHANGE UP (ref 0–0)
PH UR: 6 — SIGNIFICANT CHANGE UP (ref 5–8)
PHOSPHATE SERPL-MCNC: 3.1 MG/DL — SIGNIFICANT CHANGE UP (ref 2.5–4.5)
PLATELET # BLD AUTO: 130 K/UL — LOW (ref 150–400)
POTASSIUM SERPL-MCNC: 3.7 MMOL/L — SIGNIFICANT CHANGE UP (ref 3.5–5.3)
POTASSIUM SERPL-SCNC: 3.7 MMOL/L — SIGNIFICANT CHANGE UP (ref 3.5–5.3)
PROT UR-MCNC: NEGATIVE MG/DL — SIGNIFICANT CHANGE UP
PROTHROM AB SERPL-ACNC: 10.9 SEC — SIGNIFICANT CHANGE UP (ref 9.5–13)
RAPID RVP RESULT: DETECTED
RBC # BLD: 3.68 M/UL — LOW (ref 4.2–5.8)
RBC # FLD: 12.6 % — SIGNIFICANT CHANGE UP (ref 10.3–14.5)
SARS-COV-2 RNA SPEC QL NAA+PROBE: DETECTED
SODIUM SERPL-SCNC: 136 MMOL/L — SIGNIFICANT CHANGE UP (ref 135–145)
SP GR SPEC: 1.01 — SIGNIFICANT CHANGE UP (ref 1–1.03)
UROBILINOGEN FLD QL: 0.2 MG/DL — SIGNIFICANT CHANGE UP (ref 0.2–1)
WBC # BLD: 5.56 K/UL — SIGNIFICANT CHANGE UP (ref 3.8–10.5)
WBC # FLD AUTO: 5.56 K/UL — SIGNIFICANT CHANGE UP (ref 3.8–10.5)

## 2024-03-26 PROCEDURE — 93325 DOPPLER ECHO COLOR FLOW MAPG: CPT | Mod: 26

## 2024-03-26 PROCEDURE — 99222 1ST HOSP IP/OBS MODERATE 55: CPT

## 2024-03-26 PROCEDURE — 93312 ECHO TRANSESOPHAGEAL: CPT | Mod: 26

## 2024-03-26 PROCEDURE — 93306 TTE W/DOPPLER COMPLETE: CPT | Mod: 26

## 2024-03-26 PROCEDURE — 76376 3D RENDER W/INTRP POSTPROCES: CPT | Mod: 26

## 2024-03-26 RX ORDER — SODIUM CHLORIDE 9 MG/ML
100 INJECTION INTRAMUSCULAR; INTRAVENOUS; SUBCUTANEOUS
Refills: 0 | Status: COMPLETED | OUTPATIENT
Start: 2024-03-26 | End: 2024-03-26

## 2024-03-26 RX ORDER — ENOXAPARIN SODIUM 100 MG/ML
40 INJECTION SUBCUTANEOUS EVERY 24 HOURS
Refills: 0 | Status: DISCONTINUED | OUTPATIENT
Start: 2024-03-26 | End: 2024-04-05

## 2024-03-26 RX ORDER — BENZOCAINE AND MENTHOL 5; 1 G/100ML; G/100ML
1 LIQUID ORAL THREE TIMES A DAY
Refills: 0 | Status: DISCONTINUED | OUTPATIENT
Start: 2024-03-26 | End: 2024-04-05

## 2024-03-26 RX ORDER — REMDESIVIR 5 MG/ML
100 INJECTION INTRAVENOUS ONCE
Refills: 0 | Status: COMPLETED | OUTPATIENT
Start: 2024-03-28 | End: 2024-03-28

## 2024-03-26 RX ORDER — REMDESIVIR 5 MG/ML
100 INJECTION INTRAVENOUS ONCE
Refills: 0 | Status: COMPLETED | OUTPATIENT
Start: 2024-03-27 | End: 2024-03-27

## 2024-03-26 RX ORDER — SODIUM CHLORIDE 9 MG/ML
1000 INJECTION INTRAMUSCULAR; INTRAVENOUS; SUBCUTANEOUS
Refills: 0 | Status: DISCONTINUED | OUTPATIENT
Start: 2024-03-26 | End: 2024-03-29

## 2024-03-26 RX ORDER — DEXAMETHASONE 0.5 MG/5ML
1 ELIXIR ORAL EVERY 6 HOURS
Refills: 0 | Status: DISCONTINUED | OUTPATIENT
Start: 2024-03-26 | End: 2024-03-28

## 2024-03-26 RX ORDER — TOPIRAMATE 25 MG
50 TABLET ORAL EVERY 24 HOURS
Refills: 0 | Status: DISCONTINUED | OUTPATIENT
Start: 2024-03-26 | End: 2024-04-05

## 2024-03-26 RX ORDER — LIDOCAINE 4 G/100G
1 CREAM TOPICAL EVERY 24 HOURS
Refills: 0 | Status: DISCONTINUED | OUTPATIENT
Start: 2024-03-26 | End: 2024-04-05

## 2024-03-26 RX ORDER — DEXAMETHASONE 0.5 MG/5ML
1 ELIXIR ORAL EVERY 6 HOURS
Refills: 0 | Status: DISCONTINUED | OUTPATIENT
Start: 2024-03-26 | End: 2024-03-26

## 2024-03-26 RX ORDER — SODIUM CHLORIDE 9 MG/ML
500 INJECTION INTRAMUSCULAR; INTRAVENOUS; SUBCUTANEOUS ONCE
Refills: 0 | Status: COMPLETED | OUTPATIENT
Start: 2024-03-26 | End: 2024-03-26

## 2024-03-26 RX ORDER — MAGNESIUM SULFATE 500 MG/ML
2 VIAL (ML) INJECTION ONCE
Refills: 0 | Status: COMPLETED | OUTPATIENT
Start: 2024-03-26 | End: 2024-03-26

## 2024-03-26 RX ORDER — REMDESIVIR 5 MG/ML
200 INJECTION INTRAVENOUS ONCE
Refills: 0 | Status: COMPLETED | OUTPATIENT
Start: 2024-03-26 | End: 2024-03-26

## 2024-03-26 RX ADMIN — TRAMADOL HYDROCHLORIDE 50 MILLIGRAM(S): 50 TABLET ORAL at 01:15

## 2024-03-26 RX ADMIN — SENNA PLUS 2 TABLET(S): 8.6 TABLET ORAL at 21:00

## 2024-03-26 RX ADMIN — SODIUM CHLORIDE 100 MILLILITER(S): 9 INJECTION INTRAMUSCULAR; INTRAVENOUS; SUBCUTANEOUS at 16:12

## 2024-03-26 RX ADMIN — TRAMADOL HYDROCHLORIDE 50 MILLIGRAM(S): 50 TABLET ORAL at 00:15

## 2024-03-26 RX ADMIN — LIDOCAINE 1 PATCH: 4 CREAM TOPICAL at 18:51

## 2024-03-26 RX ADMIN — Medication 25 GRAM(S): at 07:56

## 2024-03-26 RX ADMIN — Medication 650 MILLIGRAM(S): at 19:47

## 2024-03-26 RX ADMIN — Medication 1 MILLIGRAM(S): at 18:44

## 2024-03-26 RX ADMIN — Medication 1 MILLIGRAM(S): at 23:00

## 2024-03-26 RX ADMIN — ENOXAPARIN SODIUM 40 MILLIGRAM(S): 100 INJECTION SUBCUTANEOUS at 16:18

## 2024-03-26 RX ADMIN — Medication 650 MILLIGRAM(S): at 19:48

## 2024-03-26 RX ADMIN — POLYETHYLENE GLYCOL 3350 17 GRAM(S): 17 POWDER, FOR SOLUTION ORAL at 17:01

## 2024-03-26 RX ADMIN — TRAMADOL HYDROCHLORIDE 50 MILLIGRAM(S): 50 TABLET ORAL at 07:15

## 2024-03-26 RX ADMIN — Medication 100 MILLIGRAM(S): at 21:00

## 2024-03-26 RX ADMIN — Medication 81 MILLIGRAM(S): at 17:01

## 2024-03-26 RX ADMIN — CLOPIDOGREL BISULFATE 75 MILLIGRAM(S): 75 TABLET, FILM COATED ORAL at 17:01

## 2024-03-26 RX ADMIN — Medication 50 MILLIGRAM(S): at 21:00

## 2024-03-26 RX ADMIN — TRAMADOL HYDROCHLORIDE 50 MILLIGRAM(S): 50 TABLET ORAL at 06:33

## 2024-03-26 RX ADMIN — LIDOCAINE 1 PATCH: 4 CREAM TOPICAL at 18:47

## 2024-03-26 RX ADMIN — SODIUM CHLORIDE 500 MILLILITER(S): 9 INJECTION INTRAMUSCULAR; INTRAVENOUS; SUBCUTANEOUS at 16:10

## 2024-03-26 RX ADMIN — Medication 1 MILLIGRAM(S): at 12:43

## 2024-03-26 RX ADMIN — TAMSULOSIN HYDROCHLORIDE 0.4 MILLIGRAM(S): 0.4 CAPSULE ORAL at 21:00

## 2024-03-26 RX ADMIN — ATORVASTATIN CALCIUM 80 MILLIGRAM(S): 80 TABLET, FILM COATED ORAL at 21:00

## 2024-03-26 RX ADMIN — FINASTERIDE 5 MILLIGRAM(S): 5 TABLET, FILM COATED ORAL at 17:02

## 2024-03-26 NOTE — DISCHARGE NOTE PROVIDER - NSDCFUADDAPPT_GEN_ALL_CORE_FT
You will follow up with our stroke NP Alina Lomeli. If you do not receive a call within 1 week to schedule a follow up appointment, please call [phone] to schedule an appointment. Please follow up with your PCP.  You will follow up with our stroke NP Alina Lomeli.     Please follow up with your PCP.

## 2024-03-26 NOTE — DISCHARGE NOTE PROVIDER - NSDCMRMEDTOKEN_GEN_ALL_CORE_FT
atorvastatin 40 mg oral tablet: 1 tab(s) orally once a day  finasteride 5 mg oral tablet: 1 tab(s) orally once a day  Flomax 0.4 mg oral capsule: 1 cap(s) orally once a day  lisinopril 5 mg oral tablet: 1 tab(s) orally   aspirin 81 mg oral delayed release tablet: 1 tab(s) orally once a day  clopidogrel 75 mg oral tablet: 1 tab(s) orally once a day  finasteride 5 mg oral tablet: 1 tab(s) orally once a day  Flomax 0.4 mg oral capsule: 1 cap(s) orally once a day  Lipitor 80 mg oral tablet: 1 tab(s) orally once a day  lisinopril 5 mg oral tablet: 1 tab(s) orally   aspirin 81 mg oral delayed release tablet: 1 tab(s) orally once a day  clopidogrel 75 mg oral tablet: 1 tab(s) orally once a day  finasteride 5 mg oral tablet: 1 tab(s) orally once a day  Flomax 0.4 mg oral capsule: 1 cap(s) orally once a day  lisinopril 5 mg oral tablet: 1 tab(s) orally once a day   aspirin 81 mg oral delayed release tablet: 1 tab(s) orally once a day  benzonatate 100 mg oral capsule: 1 cap(s) orally every 8 hours  clopidogrel 75 mg oral tablet: 1 tab(s) orally once a day  finasteride 5 mg oral tablet: 1 tab(s) orally once a day  Flomax 0.4 mg oral capsule: 1 cap(s) orally once a day  lidocaine 4% topical film: Apply topically to affected area once a day place on affected area  lisinopril 5 mg oral tablet: 1 tab(s) orally once a day  mirtazapine 7.5 mg oral tablet: 1 tab(s) orally once a day (at bedtime)  topiramate 50 mg oral tablet: 1 tab(s) orally every 24 hours

## 2024-03-26 NOTE — CONSULT NOTE ADULT - PROBLEM SELECTOR RECOMMENDATION 9
Pt. c/o L-sided weakness and HA, c/f stroke; Pt. received TNK; CTH reviewed, shows acute R MCA infarct  -- cont. work-up and mgmt per Neuro  -- PT/OT  -- on DAPT + high-intensity statin  -- plan for DEBBIE, possible ILR placement

## 2024-03-26 NOTE — PATIENT PROFILE ADULT - NSPROPOAURINARYCATHETER_GEN_A_NUR
Faculty Supervision of Procedure    I was present on 2023 and supervised the entire circumcision procedure performed on Carol Carl,  2023, by resident physician and patient tolerated procedure well.    Luigi Elam III, MD, FAAFP  [facility] Residency Faculty  23 3:38 PM         CIRCUMCISION PROCEDURE NOTE      Cary Name: Carol Carl  Cary :  2023  Cary MRN:  [id number]    Circumcision performed by Dr Rere Anderson on 2023.    Consent obtained: Yes, phone consent obtained     Timeout completed: YES    PREOPERATIVE DIAGNOSIS:  UNCIRCUMCISED    POSTOPERATIVE DIAGNOSIS:  CIRCUMCISED    The patient was prepped and draped using sterile technique.  Anesthetic used was 1% lidocaine in a dorsal penile nerve block technique.    Circumcision was performed using a Gomco clamp 1.1    TISSUE REMOVED:  Foreskin    POST PROCEDURE STATUS: Routine post circumcision monitoring    COMPLICATIONS: none    EBL: minimal    Rere Anderson MD  [facility] Resident   Pager #: [phone]    Supervising physician Dr. Luigi Elam.    
no

## 2024-03-26 NOTE — DISCHARGE NOTE PROVIDER - NSDCQMSTROKERISK_NEU_ALL_CORE
High blood pressure/High cholesterol/History of a stroke or TIA Blood clotting problems/High blood pressure/High cholesterol/History of a stroke or TIA

## 2024-03-26 NOTE — DISCHARGE NOTE PROVIDER - NSDCCPCAREPLAN_GEN_ALL_CORE_FT
PRINCIPAL DISCHARGE DIAGNOSIS  Diagnosis: Stroke  Assessment and Plan of Treatment: During this hospital admission, you had an ischemic stroke. During an ischemic stroke, blood stops flowing to part of your brain because of a blockage in the blood vessel. This can damage areas in the brain that control other parts of the body.  Thrombectomy was performed in order to remove the clot that was causing the blockage on the Right Middle Cerebral Artery.   Please take your aspirin and plavix for blood thinning and Atorvastatin for cholesterol medication/blood vessel protection as prescribed to prevent further strokes. Do not skip doses and do not run low on your medication. If you run low on your medication, please contact your doctor.  You will follow up outpatient with the stroke Nurse Practitioner as scheduled below.  Doing your regular tasks may be difficult after you've had a stroke, but you can learn new ways to manage your daily activities. In fact, doing daily activities may help you to regain muscle strength. Be patient, give yourself time to adjust, and appreciate the progress you make. For example, when showering or bathing, test the water temperature with a hand or foot that was not affected by the stroke, use grab bars, a shower seat, a hand-held showerhead, etc. It is normal to feel fatigue after a stroke, while some days may be worse than others, you will continue to improve.  Call 911 right away if you have any of the following symptoms of another stroke:  B: Balance: Sudden: Dizziness, loss of balance, or a sense of falling, difficulty with coordinating movement  E: Eyes: Sudden double vision or trouble seeing in one or both eyes  F: Face: Sudden uneven face  A: Arms (Legs): Sudden weakness, tingling, or loss of feeling on one side of your face or body  S: Speech: Sudden trouble talking or slurred speech, sudden difficulty understanding others  T: Time: Please call 911 right away and go to the emergency room  •Sudden, severe headache  •Blackouts or seizures     PRINCIPAL DISCHARGE DIAGNOSIS  Diagnosis: Stroke  Assessment and Plan of Treatment: During this hospital admission, you had an ischemic stroke. During an ischemic stroke, blood stops flowing to part of your brain because of a blockage in the blood vessel. This can damage areas in the brain that control other parts of the body.  Thrombectomy was performed in order to remove the clot that was causing the blockage on the Right Middle Cerebral Artery.   Please take your aspirin and plavix for 11 more days, then STOP plavix and continue daily aspirin 81mg for blood thinning and Atorvastatin for cholesterol medication/blood vessel protection as prescribed to prevent further strokes. Do not skip doses and do not run low on your medication. If you run low on your medication, please contact your doctor.  You will follow up outpatient with the stroke Nurse Practitioner  Doing your regular tasks may be difficult after you've had a stroke, but you can learn new ways to manage your daily activities. In fact, doing daily activities may help you to regain muscle strength. Be patient, give yourself time to adjust, and appreciate the progress you make. For example, when showering or bathing, test the water temperature with a hand or foot that was not affected by the stroke, use grab bars, a shower seat, a hand-held showerhead, etc. It is normal to feel fatigue after a stroke, while some days may be worse than others, you will continue to improve.  Call 911 right away if you have any of the following symptoms of another stroke:  B: Balance: Sudden: Dizziness, loss of balance, or a sense of falling, difficulty with coordinating movement  E: Eyes: Sudden double vision or trouble seeing in one or both eyes  F: Face: Sudden uneven face  A: Arms (Legs): Sudden weakness, tingling, or loss of feeling on one side of your face or body  S: Speech: Sudden trouble talking or slurred speech, sudden difficulty understanding others  T: Time: Please call 911 right away and go to the emergency room  •Sudden, severe headache  •Blackouts or seizures

## 2024-03-26 NOTE — CONSULT NOTE ADULT - SUBJECTIVE AND OBJECTIVE BOX
Patient is a 73y old  Male who presents with a chief complaint of Acute Stroke (26 Mar 2024 11:35)    HPI:  HPI: 73y Male with PMHx of HTN, BPH, HLD, coming in with left sided weakness. Pt states that he woke up at 5 am and went for a run. He came back and went to shower. He said he slipped and hit his head on the shower door.  Wife states that he heard a thud from the shower around 7:45AM. She went in and noticed his left side was weak and his head was against the shower door. He had not fallen to the ground. She called her daughter who came by, and they also called EMS.  Pt states that he slipped and fell, does not think he has any actual weakness. He wonders how his dog is doing. 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 NIHSS 17. Pt states he has a headache, 9/10 but says it isn't that bad and does not need medication for the headache.    Case discussed with Dr. Narinder Sherwood who discussed with Dr. Schreiber prior to urgent intervention. Risk and benefits of tenecteplase were discussed with patient/family member including risk of symptomatic ICH/death. Decision was made that benefits outweighed risks and tenecteplase was administered. Thrombectomy was discussed between Neurology and Neuro IR Attendings and decision was made to proceed with thrombectomy. Tenecteplase given at 8:46AM.     Pt was seen about 30 minutes after TNK was given, NIHSS improved to 15. Headache similar to how it was prior to tenecteplase administration.  (24 Mar 2024 12:17)    Review of Systems: 12 point review of systems otherwise negative    PAST MEDICAL & SURGICAL HISTORY:  Hypertension      History of BPH      CVA (cerebrovascular accident)      Hyperlipidemia        MEDICATIONS  (STANDING):  aspirin enteric coated 81 milliGRAM(s) Oral daily  atorvastatin 80 milliGRAM(s) Oral at bedtime  clopidogrel Tablet 75 milliGRAM(s) Oral daily  dexAMETHasone  Injectable 1 milliGRAM(s) IV Push every 6 hours  finasteride 5 milliGRAM(s) Oral daily  insulin lispro (ADMELOG) corrective regimen sliding scale   SubCutaneous Before meals and at bedtime  polyethylene glycol 3350 17 Gram(s) Oral daily  senna 2 Tablet(s) Oral at bedtime  tamsulosin 0.4 milliGRAM(s) Oral at bedtime  topiramate 50 milliGRAM(s) Oral every 24 hours    MEDICATIONS  (PRN):  acetaminophen     Tablet .. 650 milliGRAM(s) Oral every 6 hours PRN Temp greater or equal to 38C (100.4F), Mild Pain (1 - 3)  ondansetron Injectable 4 milliGRAM(s) IV Push every 8 hours PRN Nausea and/or Vomiting      Allergies    Allergy Status Unknown    Intolerances          Vital Signs Last 24 Hrs  T(C): 36.9 (26 Mar 2024 13:06), Max: 38.7 (25 Mar 2024 21:20)  T(F): 98.4 (26 Mar 2024 13:06), Max: 101.7 (25 Mar 2024 21:20)  HR: 62 (26 Mar 2024 10:25) (56 - 75)  BP: 113/70 (26 Mar 2024 10:25) (106/55 - 130/72)  BP(mean): 86 (26 Mar 2024 10:25) (75 - 94)  RR: 14 (26 Mar 2024 10:25) (14 - 19)  SpO2: 94% (26 Mar 2024 10:25) (94% - 97%)    Parameters below as of 26 Mar 2024 10:25  Patient On (Oxygen Delivery Method): room air      CAPILLARY BLOOD GLUCOSE      POCT Blood Glucose.: 96 mg/dL (26 Mar 2024 11:29)  POCT Blood Glucose.: 94 mg/dL (26 Mar 2024 06:29)  POCT Blood Glucose.: 92 mg/dL (25 Mar 2024 21:35)  POCT Blood Glucose.: 77 mg/dL (25 Mar 2024 16:20)      03-25 @ 07:01  -  03-26 @ 07:00  --------------------------------------------------------  IN: 725 mL / OUT: 900 mL / NET: -175 mL        Physical Exam:  (earlier today)  Daily     Daily   General:  non-toxic appearing in NAD  HEENT:  MMM  CV:  RRR  Lungs:  CTA B/L  Abdomen:  soft NT ND  Extremities:  no edema B/L LE  Skin:  WWP  Neuro:  AAOx3, no dysarthria    LABS:                        12.4   5.56  )-----------( 130      ( 26 Mar 2024 05:30 )             37.5     03-26    136  |  104  |  8   ----------------------------<  91  3.7   |  28  |  0.66    Ca    8.8      26 Mar 2024 05:30  Phos  3.1     03-26  Mg     1.8     03-26    TPro  5.6<L>  /  Alb  3.8  /  TBili  0.8  /  DBili  x   /  AST  17  /  ALT  10  /  AlkPhos  92  03-24    PT/INR - ( 24 Mar 2024 16:33 )   PT: 11.5 sec;   INR: 1.01          PTT - ( 24 Mar 2024 16:33 )  PTT:24.9 sec  Urinalysis Basic - ( 26 Mar 2024 05:30 )    Color: x / Appearance: x / SG: x / pH: x  Gluc: 91 mg/dL / Ketone: x  / Bili: x / Urobili: x   Blood: x / Protein: x / Nitrite: x   Leuk Esterase: x / RBC: x / WBC x   Sq Epi: x / Non Sq Epi: x / Bacteria: x

## 2024-03-26 NOTE — DISCHARGE NOTE PROVIDER - CARE PROVIDERS DIRECT ADDRESSES
[email or web address]

## 2024-03-26 NOTE — DISCHARGE NOTE PROVIDER - CARE PROVIDER_API CALL
Lorne Sage  Interventional Neuroradiology  [address] [place] [address]  Phone: [phone]  Fax: [phone]  Follow Up Time:    Lorne Sage  Interventional Neuroradiology  [address] [place] [address]  Phone: [phone]  Fax: [phone]  Follow Up Time:     Alina Lomeli  NP in Family Health  [address]  [place] [address]  Phone: [phone]  Fax: [phone]  Follow Up Time: 2 weeks   Lorne Sage  Interventional Neuroradiology  [address] [place] [address]  Phone: [phone]  Fax: [phone]  Follow Up Time:     Alian Lomeli  NP in Family Health  [address]  [place] [address]  Phone: [phone]  Fax: [phone]  Scheduled Appointment: 04/17/2024 01:00 PM

## 2024-03-26 NOTE — DISCHARGE NOTE PROVIDER - NSDCFUSCHEDAPPT_GEN_ALL_CORE_FT
[facility] Physician [facility]  [facility] [address]  Scheduled Appointment: 04/18/2024

## 2024-03-26 NOTE — PROGRESS NOTE ADULT - SUBJECTIVE AND OBJECTIVE BOX
Neurology Stroke Progress Note    INTERVAL HPI/OVERNIGHT EVENTS:  Patient seen and examined.  Patient was A&Ox3 and was cooperative throughout the exam. He is still complaining of headache and rates the pain 10/10, consistent with headache that he presented with yesterday.     MEDICATIONS  (STANDING):  aspirin enteric coated 81 milliGRAM(s) Oral daily  atorvastatin 80 milliGRAM(s) Oral at bedtime  clopidogrel Tablet 75 milliGRAM(s) Oral daily  dexAMETHasone  IVPB 1 milliGRAM(s) IV Intermittent every 6 hours  finasteride 5 milliGRAM(s) Oral daily  insulin lispro (ADMELOG) corrective regimen sliding scale   SubCutaneous Before meals and at bedtime  polyethylene glycol 3350 17 Gram(s) Oral daily  senna 2 Tablet(s) Oral at bedtime  tamsulosin 0.4 milliGRAM(s) Oral at bedtime  topiramate 50 milliGRAM(s) Oral every 24 hours    MEDICATIONS  (PRN):  acetaminophen     Tablet .. 650 milliGRAM(s) Oral every 6 hours PRN Temp greater or equal to 38C (100.4F), Mild Pain (1 - 3)  ondansetron Injectable 4 milliGRAM(s) IV Push every 8 hours PRN Nausea and/or Vomiting      Allergies    Allergy Status Unknown    Intolerances        Vital Signs Last 24 Hrs  T(C): 36.6 (26 Mar 2024 10:41), Max: 38.7 (25 Mar 2024 21:20)  T(F): 97.8 (26 Mar 2024 10:41), Max: 101.7 (25 Mar 2024 21:20)  HR: 62 (26 Mar 2024 10:25) (56 - 75)  BP: 113/70 (26 Mar 2024 10:25) (106/55 - 130/72)  BP(mean): 86 (26 Mar 2024 10:25) (75 - 94)  RR: 14 (26 Mar 2024 10:25) (14 - 19)  SpO2: 94% (26 Mar 2024 10:25) (94% - 97%)    Parameters below as of 26 Mar 2024 10:25  Patient On (Oxygen Delivery Method): room air        Physical exam:  General: No acute distress, awake and alert  Eyes: moist conjunctivae, see below for CNs  Neck: FROM  Cardiovascular: Regular rate and rhythm  Pulmonary: No use of accessory muscles  Extremities: no edema    **************Neurologic:  -Mental status: Awake, alert, oriented to person, place, and time. Speech is fluent with intact naming, repetition, and comprehension, Mild dysarthria. Recent and remote memory intact. Follows commands. Attention/concentration intact. Fund of knowledge appropriate.  -Cranial nerves:   III, IV, VI: Extraocular movements are intact without nystagmus. Pupils equally round and reactive to light  V:  Facial sensation V1-V3 equal and intact   VII: Left facial droop   Motor: Normal bulk and tone. Left upper extremity drift with no pronation observed. Left upper extremity 4/5, Left lower extremity 3/5. Right upper and lower extremity 5/5.   Sensation: Intact to light touch bilaterally. No neglect or extinction on double simultaneous testing.  Coordination: No dysmetria on finger-to-nose bilaterally    LABS:                        12.4   5.56  )-----------( 130      ( 26 Mar 2024 05:30 )             37.5     03-26    136  |  104  |  8   ----------------------------<  91  3.7   |  28  |  0.66    Ca    8.8      26 Mar 2024 05:30  Phos  3.1     03-26  Mg     1.8     03-26    TPro  5.6<L>  /  Alb  3.8  /  TBili  0.8  /  DBili  x   /  AST  17  /  ALT  10  /  AlkPhos  92  03-24    PT/INR - ( 24 Mar 2024 16:33 )   PT: 11.5 sec;   INR: 1.01          PTT - ( 24 Mar 2024 16:33 )  PTT:24.9 sec  Urinalysis Basic - ( 26 Mar 2024 05:30 )    Color: x / Appearance: x / SG: x / pH: x  Gluc: 91 mg/dL / Ketone: x  / Bili: x / Urobili: x   Blood: x / Protein: x / Nitrite: x   Leuk Esterase: x / RBC: x / WBC x   Sq Epi: x / Non Sq Epi: x / Bacteria: x        RADIOLOGY & ADDITIONAL TESTS:  Reviewed.  Neurology Stroke Progress Note    INTERVAL HPI/OVERNIGHT EVENTS:  Patient seen and examined.  Patient was A&Ox3 and was cooperative throughout the exam. He is still complaining of headache and rates the pain 10/10, consistent with headache that he presented with yesterday.     MEDICATIONS  (STANDING):  aspirin enteric coated 81 milliGRAM(s) Oral daily  atorvastatin 80 milliGRAM(s) Oral at bedtime  clopidogrel Tablet 75 milliGRAM(s) Oral daily  dexAMETHasone  IVPB 1 milliGRAM(s) IV Intermittent every 6 hours  finasteride 5 milliGRAM(s) Oral daily  insulin lispro (ADMELOG) corrective regimen sliding scale   SubCutaneous Before meals and at bedtime  polyethylene glycol 3350 17 Gram(s) Oral daily  senna 2 Tablet(s) Oral at bedtime  tamsulosin 0.4 milliGRAM(s) Oral at bedtime  topiramate 50 milliGRAM(s) Oral every 24 hours    MEDICATIONS  (PRN):  acetaminophen     Tablet .. 650 milliGRAM(s) Oral every 6 hours PRN Temp greater or equal to 38C (100.4F), Mild Pain (1 - 3)  ondansetron Injectable 4 milliGRAM(s) IV Push every 8 hours PRN Nausea and/or Vomiting      Allergies    Allergy Status Unknown    Intolerances        Vital Signs Last 24 Hrs  T(C): 36.6 (26 Mar 2024 10:41), Max: 38.7 (25 Mar 2024 21:20)  T(F): 97.8 (26 Mar 2024 10:41), Max: 101.7 (25 Mar 2024 21:20)  HR: 62 (26 Mar 2024 10:25) (56 - 75)  BP: 113/70 (26 Mar 2024 10:25) (106/55 - 130/72)  BP(mean): 86 (26 Mar 2024 10:25) (75 - 94)  RR: 14 (26 Mar 2024 10:25) (14 - 19)  SpO2: 94% (26 Mar 2024 10:25) (94% - 97%)    Parameters below as of 26 Mar 2024 10:25  Patient On (Oxygen Delivery Method): room air        Physical exam:  General: No acute distress, awake and alert  Eyes: moist conjunctivae, see below for CNs  Neck: FROM  Cardiovascular: Regular rate and rhythm  Pulmonary: No use of accessory muscles  Extremities: no edema    Neurologic:  -Mental status: Awake, alert, oriented to person, place, and time. Speech is fluent with intact naming, repetition, and comprehension, Mild dysarthria. Recent and remote memory intact. Follows commands. Attention/concentration intact. Fund of knowledge appropriate.  -Cranial nerves:   III, IV, VI: Extraocular movements are intact without nystagmus. Pupils equally round and reactive to light  V:  Facial sensation V1-V3 equal and intact   VII: Left facial droop   Motor: Normal bulk and tone. Left upper extremity drift with no pronation observed. Left upper extremity 4/5, Left lower extremity 3/5. Right upper and lower extremity 5/5.   Sensation: Intact to light touch bilaterally. No neglect or extinction on double simultaneous testing.  Coordination: No dysmetria on finger-to-nose bilaterally    LABS:                        12.4   5.56  )-----------( 130      ( 26 Mar 2024 05:30 )             37.5     03-26    136  |  104  |  8   ----------------------------<  91  3.7   |  28  |  0.66    Ca    8.8      26 Mar 2024 05:30  Phos  3.1     03-26  Mg     1.8     03-26    TPro  5.6<L>  /  Alb  3.8  /  TBili  0.8  /  DBili  x   /  AST  17  /  ALT  10  /  AlkPhos  92  03-24    PT/INR - ( 24 Mar 2024 16:33 )   PT: 11.5 sec;   INR: 1.01          PTT - ( 24 Mar 2024 16:33 )  PTT:24.9 sec  Urinalysis Basic - ( 26 Mar 2024 05:30 )    Color: x / Appearance: x / SG: x / pH: x  Gluc: 91 mg/dL / Ketone: x  / Bili: x / Urobili: x   Blood: x / Protein: x / Nitrite: x   Leuk Esterase: x / RBC: x / WBC x   Sq Epi: x / Non Sq Epi: x / Bacteria: x        RADIOLOGY & ADDITIONAL TESTS:  Reviewed.  Neurology Stroke Progress Note    INTERVAL HPI/OVERNIGHT EVENTS:  Patient seen and examined.  Patient was A&Ox3 and was cooperative throughout the exam. He is still complaining of headache and rates the pain 10/10, consistent with headache that he presented with yesterday.     MEDICATIONS  (STANDING):  aspirin enteric coated 81 milliGRAM(s) Oral daily  atorvastatin 80 milliGRAM(s) Oral at bedtime  clopidogrel Tablet 75 milliGRAM(s) Oral daily  dexAMETHasone  IVPB 1 milliGRAM(s) IV Intermittent every 6 hours  finasteride 5 milliGRAM(s) Oral daily  insulin lispro (ADMELOG) corrective regimen sliding scale   SubCutaneous Before meals and at bedtime  polyethylene glycol 3350 17 Gram(s) Oral daily  senna 2 Tablet(s) Oral at bedtime  tamsulosin 0.4 milliGRAM(s) Oral at bedtime  topiramate 50 milliGRAM(s) Oral every 24 hours    MEDICATIONS  (PRN):  acetaminophen     Tablet .. 650 milliGRAM(s) Oral every 6 hours PRN Temp greater or equal to 38C (100.4F), Mild Pain (1 - 3)  ondansetron Injectable 4 milliGRAM(s) IV Push every 8 hours PRN Nausea and/or Vomiting      Allergies    Allergy Status Unknown    Intolerances        Vital Signs Last 24 Hrs  T(C): 36.6 (26 Mar 2024 10:41), Max: 38.7 (25 Mar 2024 21:20)  T(F): 97.8 (26 Mar 2024 10:41), Max: 101.7 (25 Mar 2024 21:20)  HR: 62 (26 Mar 2024 10:25) (56 - 75)  BP: 113/70 (26 Mar 2024 10:25) (106/55 - 130/72)  BP(mean): 86 (26 Mar 2024 10:25) (75 - 94)  RR: 14 (26 Mar 2024 10:25) (14 - 19)  SpO2: 94% (26 Mar 2024 10:25) (94% - 97%)    Parameters below as of 26 Mar 2024 10:25  Patient On (Oxygen Delivery Method): room air        Physical exam:  General: No acute distress, awake and alert  Eyes: moist conjunctivae, see below for CNs  Neck: FROM  Cardiovascular: Regular rate and rhythm  Pulmonary: No use of accessory muscles  Extremities: no edema    Neurologic:  -Mental status: Awake, lethargic, opens eyes and follows commands easily to voice, oriented to person, place, and time. Speech is fluent with intact naming, repetition, and comprehension, Mild dysarthria. Recent and remote memory intact. Follows commands.    -Cranial nerves:   III, IV, VI: Extraocular movements are intact without nystagmus. Pupils equally round and reactive to light  V:  Facial sensation V1-V3 equal and intact   VII: Left facial droop   Motor: Normal bulk and tone. Left upper extremity drift with no pronation observed. Left upper extremity 4/5, Left lower extremity 3/5. Right upper and lower extremity 5/5.   Sensation: Intact to light touch bilaterally. No neglect or extinction on double simultaneous testing.  Coordination: No dysmetria on finger-to-nose bilaterally    LABS:                        12.4   5.56  )-----------( 130      ( 26 Mar 2024 05:30 )             37.5     03-26    136  |  104  |  8   ----------------------------<  91  3.7   |  28  |  0.66    Ca    8.8      26 Mar 2024 05:30  Phos  3.1     03-26  Mg     1.8     03-26    TPro  5.6<L>  /  Alb  3.8  /  TBili  0.8  /  DBili  x   /  AST  17  /  ALT  10  /  AlkPhos  92  03-24    PT/INR - ( 24 Mar 2024 16:33 )   PT: 11.5 sec;   INR: 1.01          PTT - ( 24 Mar 2024 16:33 )  PTT:24.9 sec  Urinalysis Basic - ( 26 Mar 2024 05:30 )    Color: x / Appearance: x / SG: x / pH: x  Gluc: 91 mg/dL / Ketone: x  / Bili: x / Urobili: x   Blood: x / Protein: x / Nitrite: x   Leuk Esterase: x / RBC: x / WBC x   Sq Epi: x / Non Sq Epi: x / Bacteria: x        RADIOLOGY & ADDITIONAL TESTS:  Reviewed.

## 2024-03-26 NOTE — DISCHARGE NOTE PROVIDER - PROVIDER TOKENS
PROVIDER:[TOKEN:[[id number]]] PROVIDER:[TOKEN:[[id number]]],PROVIDER:[TOKEN:[[id number]],FOLLOWUP:[2 weeks]] PROVIDER:[TOKEN:[[id number]]],PROVIDER:[TOKEN:[[id number]],SCHEDULEDAPPT:[04/17/2024],SCHEDULEDAPPTTIME:[01:00 PM]]

## 2024-03-26 NOTE — PROGRESS NOTE ADULT - ASSESSMENT
Assessment     74 y/o M w/PMHX of HTN, BPH, HLD, mitral valve repair present to ED with left sided weakness, left facial droop, and left gaze preference, s/p TNK (3/24/24). Initial NIHSS 17 after TNK was administered, the score was reduced to 15. Stroke code imaging demonstrates right M1 occlusion, now s/p thrombectomy Right M1/2, TICI 0 to 2B (3/24/24). 24-hour CTH post-TNK/thrombectomy demonstrated acute Right MCA infarct, consistent with initial CTH and no acute hemorrhage seen. NIHSS on 3/25/24 improved to 3.     Neuro  #Right MCA infarct status post TNK and thrombectomy with Initial NIHSS 17 improved to 3.   - continue on aspirin 81mg and plavix 75mg daily  - continue atorvastatin 80mg daily  - q4hr stroke neuro checks and vitals  - obtain MRI Brain without contrast   - Stroke Code HCT Results: No gross acute intracranial hemorrhage or mass effect.  Hyperdense right M1 MCA, concerning for thrombosis.  - Stroke Code CTA Results:   CT PERFUSION: Small core infarct, large ischemic penumbra in the right   MCA territory.  CTA COW: Abrupt right M1 occlusion with M2 reconstitution.  CTA NECK: Patent, ECAs, ICAs, no  hemodynamically significant stenosis at ICA origins by NASCET criteria.  Bilateral vertebral arteries are patent without flow limiting stenosis.  - Stroke education    *********#Headache     Cards  #HTN  - permissive hypertension, Goal -180  - hold home blood pressure medication for now  - Echo w/bubble: Aortic sclerosis without significant stenosis.  An annuloplasty ring is noted in the mitral position. Mean   transvalvular gradient is 2.30 mmHg at a heart rate of 67 bpm.  - Pending DEBBIE       #HLD  - high dose statin as above in CVA  - LDL results: 59 mg/dL    Pulm  - call provider if SPO2 < 94%    GI  #Nutrition/Fluids/Electrolytes   - replete K<4 and Mg <2  - Diet: Soft and Bite-sized     Renal  - monitor and trend Cr    Infectious Disease  - afebrile on admission, no leukocytosis    Endocrine  #DM  - A1C results:5.5  - ISS    - TSH results: 0.603 uIU/mL         DVT Prophylaxis  - lovenox sq for DVT prophylaxis   - SCDs for DVT prophylaxis       IDR Goals: Goals reviewed at interdisciplinary rounds with case management, social work, physical therapy, occupational therapy, and speech language pathology.   Please see specific therapy  notes for in depth goals.  Dispo: Pending      Discussed daily hospital plans and goals with patient and family at bedside.  Discussed with Neurology Attending Dr. Schreiber    Assessment     72 y/o M w/PMHX of HTN, BPH, HLD, mitral valve repair present to ED with left sided weakness, left facial droop, and left gaze preference, s/p TNK (3/24/24). Initial NIHSS 17 after TNK was administered, the score was reduced to 15. Stroke code imaging demonstrates right M1 occlusion, now s/p thrombectomy Right M1/2, TICI 0 to 2B (3/24/24). 24-hour CTH post-TNK/thrombectomy demonstrated acute Right MCA infarct, consistent with initial CTH and no acute hemorrhage seen. NIHSS on 3/25/24 improved to 3.     Neuro  #Right MCA infarct status post TNK and thrombectomy with Initial NIHSS 17 improved to 3.   - continue on aspirin 81mg and plavix 75mg daily  - continue atorvastatin 80mg daily  - q4hr stroke neuro checks and vitals  - obtain MRI Brain without contrast   - Stroke Code HCT Results: No gross acute intracranial hemorrhage or mass effect.  Hyperdense right M1 MCA, concerning for thrombosis.  - Stroke Code CTA Results:   CT PERFUSION: Small core infarct, large ischemic penumbra in the right   MCA territory.  CTA COW: Abrupt right M1 occlusion with M2 reconstitution.  CTA NECK: Patent, ECAs, ICAs, no  hemodynamically significant stenosis at ICA origins by NASCET criteria.  Bilateral vertebral arteries are patent without flow limiting stenosis.  - Stroke education    #Headache  - Decadron 1 mg every 6 hours   - Topiramate 50 mg once daily      Cards  #HTN  - permissive hypertension, Goal -180  - hold home blood pressure medication for now  - Echo w/bubble: Aortic sclerosis without significant stenosis.  An annuloplasty ring is noted in the mitral position. Mean   transvalvular gradient is 2.30 mmHg at a heart rate of 67 bpm.  - Pending DEBBIE       #HLD  - high dose statin as above in CVA  - LDL results: 59 mg/dL    Pulm  - call provider if SPO2 < 94%    GI  #Nutrition/Fluids/Electrolytes   - replete K<4 and Mg <2  - Diet: Soft and Bite-sized     Renal  - monitor and trend Cr    Infectious Disease  - afebrile on admission, no leukocytosis    Endocrine  #DM  - A1C results:5.5  - ISS    - TSH results: 0.603 uIU/mL         DVT Prophylaxis  - lovenox sq for DVT prophylaxis   - SCDs for DVT prophylaxis       IDR Goals: Goals reviewed at interdisciplinary rounds with case management, social work, physical therapy, occupational therapy, and speech language pathology.   Please see specific therapy  notes for in depth goals.  Dispo: Pending      Discussed daily hospital plans and goals with patient and family at bedside.  Discussed with Neurology Attending Dr. Schreiber    Assessment     72 y/o M w/PMHX of HTN, BPH, HLD, mitral valve repair present to ED with left sided weakness, left facial droop, and left gaze preference, s/p TNK (3/24/24). Initial NIHSS 17 after TNK was administered, the score was reduced to 15. Stroke code imaging demonstrates right M1 occlusion, now s/p thrombectomy Right M1/2, TICI 0 to 2B (3/24/24). 24-hour CTH post-TNK/thrombectomy demonstrated acute Right MCA infarct, consistent with initial CTH and no acute hemorrhage seen. NIHSS on 3/25/24 improved to 3.     Neuro  #Right MCA infarct status post TNK and thrombectomy with Initial NIHSS 17 improved to 3.   - continue on aspirin 81mg and plavix 75mg daily  - continue atorvastatin 80mg daily  - q4hr stroke neuro checks and vitals  - obtain MRI Brain without contrast   - Stroke Code HCT Results: No gross acute intracranial hemorrhage or mass effect.  Hyperdense right M1 MCA, concerning for thrombosis.  - Stroke Code CTA Results:   CT PERFUSION: Small core infarct, large ischemic penumbra in the right   MCA territory.  CTA COW: Abrupt right M1 occlusion with M2 reconstitution.  CTA NECK: Patent, ECAs, ICAs, no  hemodynamically significant stenosis at ICA origins by NASCET criteria.  Bilateral vertebral arteries are patent without flow limiting stenosis.  - Stroke education    #Headache  - Decadron 1 mg every 6 hours   - Topiramate 50 mg once daily      Cards  #HTN  - permissive hypertension, Goal -180  - hold home blood pressure medication for now  - Echo w/bubble: Aortic sclerosis without significant stenosis.  An annuloplasty ring is noted in the mitral position. Mean   transvalvular gradient is 2.30 mmHg at a heart rate of 67 bpm.  - Pending DEBBIE       #HLD  - high dose statin as above in CVA  - LDL results: 59 mg/dL    Pulm  - call provider if SPO2 < 94%    GI  #Nutrition/Fluids/Electrolytes   - replete K<4 and Mg <2  - Diet: Soft and Bite-sized     Renal  - monitor and trend Cr    Infectious Disease  - afebrile on admission, no leukocytosis    Endocrine  #DM  - A1C results:5.5  - ISS    - TSH results: 0.603 uIU/mL         DVT Prophylaxis  - lovenox sq for DVT prophylaxis   - SCDs for DVT prophylaxis       IDR Goals: Goals reviewed at interdisciplinary rounds with case management, social work, physical therapy, occupational therapy, and speech language pathology.   Please see specific therapy  notes for in depth goals.  Dispo: Pending      Discussed daily hospital plans and goals with patient and family at bedside.  Discussed with Neurology Attending Dr. Schreiber.    Assessment     72 y/o M w/PMHX of HTN, BPH, HLD, mitral valve repair present to ED with left sided weakness, left facial droop, and left gaze preference, s/p TNK (3/24/24). Initial NIHSS 17 after TNK was administered, the score was reduced to 15. Stroke code imaging demonstrates right M1 occlusion, now s/p thrombectomy Right M1/2, TICI 0 to 2B (3/24/24). 24-hour CTH post-TNK/thrombectomy demonstrated acute Right MCA infarct, consistent with initial CTH and no acute hemorrhage seen. NIHSS on 3/25/24 improved to 3.     Neuro  #Right MCA infarct status post TNK and thrombectomy with Initial NIHSS 17 improved to 3.   - continue on aspirin 81mg and plavix 75mg daily  - continue atorvastatin 80mg daily  - q4hr stroke neuro checks and vitals  - Stroke Code HCT Results: No gross acute intracranial hemorrhage or mass effect.  Hyperdense right M1 MCA, concerning for thrombosis.  - Stroke Code CTA Results:   CT PERFUSION: Small core infarct, large ischemic penumbra in the right   MCA territory.  CTA COW: Abrupt right M1 occlusion with M2 reconstitution.  CTA NECK: Patent, ECAs, ICAs, no  hemodynamically significant stenosis at ICA origins by NASCET criteria.  Bilateral vertebral arteries are patent without flow limiting stenosis.  - Stroke education    #Headache  - Decadron 1 mg every 6 hours   - Topiramate 50 mg once daily      Cards  #HTN  - permissive hypertension, Goal -180  - hold home blood pressure medication for now  - Echo w/bubble: Aortic sclerosis without significant stenosis.  An annuloplasty ring is noted in the mitral position. Mean   transvalvular gradient is 2.30 mmHg at a heart rate of 67 bpm.  - Pending DEBBIE       #HLD  - high dose statin as above in CVA  - LDL results: 59 mg/dL    Pulm  - call provider if SPO2 < 94%    GI  #Nutrition/Fluids/Electrolytes   - replete K<4 and Mg <2  - Diet: Soft and Bite-sized     Renal  - monitor and trend Cr    Infectious Disease  - afebrile on admission, no leukocytosis    Endocrine  #DM  - A1C results:5.5  - ISS    - TSH results: 0.603 uIU/mL         DVT Prophylaxis  - lovenox sq for DVT prophylaxis   - SCDs for DVT prophylaxis       IDR Goals: Goals reviewed at interdisciplinary rounds with case management, social work, physical therapy, occupational therapy, and speech language pathology.   Please see specific therapy  notes for in depth goals.  Dispo: Pending      Discussed daily hospital plans and goals with patient and family at bedside.  Discussed with Neurology Attending Dr. Schreiber.    74 y/o M w/PMHX of HTN, BPH, HLD, mitral valve repair present to ED with left sided weakness, left facial droop, and left gaze preference, s/p TNK (3/24/24). Initial NIHSS 17 after TNK was administered, the score was reduced to 15. Stroke code imaging demonstrates right M1 occlusion, now s/p thrombectomy Right M1/2, TICI 0 to 2B (3/24/24). 24-hour CTH post-TNK/thrombectomy demonstrated acute Right MCA infarct, consistent with initial CTH and no acute hemorrhage seen. NIHSS on 3/25/24 improved to 3.     Neuro  #Right MCA infarct status post TNK and thrombectomy with Initial NIHSS 17 improved to 3.   - continue on aspirin 81mg and plavix 75mg daily  - continue atorvastatin 80mg daily  - q4hr stroke neuro checks and vitals  - Stroke Code HCT Results: No gross acute intracranial hemorrhage or mass effect. Hyperdense right M1 MCA, concerning for thrombosis.  CTA COW: Abrupt right M1 occlusion with M2 reconstitution.  CTA NECK: Patent, ECAs, ICAs, no  hemodynamically significant stenosis at ICA origins by NASCET criteria. Bilateral vertebral arteries are patent without flow limiting stenosis.  - no need for MRI as stroke is seen on CTH  - Stroke education    #Headache  - Decadron 1 mg every 6 hours   - Topiramate increased from 25mg to 50 mg once daily on 3/26   - d/c tramadol 2/2 sedation  - can also receive PRN IV tylenol for pain    Cards  #HTN  - permissive hypertension, Goal -180  - hold home blood pressure medication for now  - Echo w/bubble: Aortic sclerosis without significant stenosis.  An annuloplasty ring is noted in the mitral position. Mean   transvalvular gradient is 2.30 mmHg at a heart rate of 67 bpm.  < from: DEBBIE w/Doppler (03.26.24 @ 14:01) >   1. Normal left and right ventricular size and systolic function.   2. No LA/RA/DANIEL/RAA thrombus seen.   3. No evidence of an intracardiac shunt.   4. Mild aorticregurgitation.   5. An annuloplasty ring is noted in the mitral position. The mean   transvalvular gradient is 2.00 mmHg at a heart rate of 68 bpm. There is   trace mitral regurgitation.   6. No echo evidence of pulmonary HTN.   7. No pericardial effusion.    #HLD  - high dose statin as above in CVA  - LDL results: 59 mg/dL    Pulm  - call provider if SPO2 < 94%    GI  #Nutrition/Fluids/Electrolytes   - replete K<4 and Mg <2  - Diet: Soft and Bite-sized   - IVF: s/p NS 500cc bolus x 1 as SBP dropped to low 100s after standing the patient up    Renal  - monitor and trend Cr    Infectious Disease  - afebrile on admission, no leukocytosis    Endocrine  - A1C results: 5.5    - TSH results: 0.603      Urology  #BPH  - c/w finasteride and flomax    DVT Prophylaxis  - lovenox sq for DVT prophylaxis   - SCDs for DVT prophylaxis       IDR Goals: Goals reviewed at interdisciplinary rounds with case management, social work, physical therapy, occupational therapy, and speech language pathology.   Please see specific therapy  notes for in depth goals.  Dispo: AR - can tolerate 3 hours of therapy     Discussed daily hospital plans and goals with patient and family at bedside.  Discussed with Neurology Attending Dr. Schreibre.    74 y/o M w/PMHX of HTN, BPH, HLD, mitral valve repair present to ED with left sided weakness, left facial droop, and left gaze preference, s/p TNK (3/24/24). Initial NIHSS 17 after TNK was administered, the score was reduced to 15. Stroke code imaging demonstrates right M1 occlusion, now s/p thrombectomy Right M1/2, TICI 0 to 2B (3/24/24). 24-hour CTH post-TNK/thrombectomy demonstrated acute Right MCA infarct, consistent with initial CTH and no acute hemorrhage seen. NIHSS on 3/25/24 improved to 3.     Neuro  #Right MCA infarct status post TNK and thrombectomy with Initial NIHSS 17 improved to 3.   - continue on aspirin 81mg and plavix 75mg daily  - continue atorvastatin 80mg daily  - q4hr stroke neuro checks and vitals  - Stroke Code HCT Results: No gross acute intracranial hemorrhage or mass effect. Hyperdense right M1 MCA, concerning for thrombosis.  CTA COW: Abrupt right M1 occlusion with M2 reconstitution.  CTA NECK: Patent, ECAs, ICAs, no  hemodynamically significant stenosis at ICA origins by NASCET criteria. Bilateral vertebral arteries are patent without flow limiting stenosis.  - no need for MRI as stroke is seen on CTH  - Stroke education  - EP c/s for ILR 3/27    #Headache  - Decadron 1 mg every 6 hours   - Topiramate increased from 25mg to 50 mg once daily on 3/26   - d/c tramadol 2/2 sedation  - can also receive PRN IV tylenol for pain    Cards  #HTN  - permissive hypertension, Goal -180  - hold home blood pressure medication for now  - Echo w/bubble: Aortic sclerosis without significant stenosis.  An annuloplasty ring is noted in the mitral position. Mean   transvalvular gradient is 2.30 mmHg at a heart rate of 67 bpm.  < from: DEBBIE w/Doppler (03.26.24 @ 14:01) >   1. Normal left and right ventricular size and systolic function.   2. No LA/RA/DANIEL/RAA thrombus seen.   3. No evidence of an intracardiac shunt.   4. Mild aorticregurgitation.   5. An annuloplasty ring is noted in the mitral position. The mean   transvalvular gradient is 2.00 mmHg at a heart rate of 68 bpm. There is   trace mitral regurgitation.   6. No echo evidence of pulmonary HTN.   7. No pericardial effusion.    #HLD  - high dose statin as above in CVA  - LDL results: 59 mg/dL    Pulm  - call provider if SPO2 < 94%    GI  #Nutrition/Fluids/Electrolytes   - replete K<4 and Mg <2  - Diet: Soft and Bite-sized   - IVF: s/p NS 500cc bolus x 1 as SBP dropped to low 100s after standing the patient up, started maintenance fluids    Renal  - monitor and trend Cr    Infectious Disease  - afebrile on admission, no leukocytosis    Endocrine  - A1C results: 5.5    - TSH results: 0.603      Urology  #BPH  - c/w finasteride and flomax    DVT Prophylaxis  - lovenox sq for DVT prophylaxis   - SCDs for DVT prophylaxis       IDR Goals: Goals reviewed at interdisciplinary rounds with case management, social work, physical therapy, occupational therapy, and speech language pathology.   Please see specific therapy  notes for in depth goals.  Dispo: AR - can tolerate 3 hours of therapy     Discussed daily hospital plans and goals with patient and family at bedside.  Discussed with Neurology Attending Dr. Schreiber.    74 y/o M w/PMHX of HTN, BPH, HLD, mitral valve repair present to ED with left sided weakness, left facial droop, and left gaze preference, s/p TNK (3/24/24). Initial NIHSS 17 after TNK was administered, the score was reduced to 15. Stroke code imaging demonstrates right M1 occlusion, now s/p thrombectomy Right M1/2, TICI 0 to 2B (3/24/24). 24-hour CTH post-TNK/thrombectomy demonstrated acute Right MCA infarct, consistent with initial CTH and no acute hemorrhage seen. NIHSS on 3/25/24 improved to 3.     Neuro  #Right MCA infarct status post TNK and thrombectomy with Initial NIHSS 17 improved to 3.   - continue on aspirin 81mg and plavix 75mg daily  - continue atorvastatin 80mg daily  - q4hr stroke neuro checks and vitals  - Stroke Code HCT Results: No gross acute intracranial hemorrhage or mass effect. Hyperdense right M1 MCA, concerning for thrombosis.  CTA COW: Abrupt right M1 occlusion with M2 reconstitution.  CTA NECK: Patent, ECAs, ICAs, no  hemodynamically significant stenosis at ICA origins by NASCET criteria. Bilateral vertebral arteries are patent without flow limiting stenosis.  - no need for MRI as stroke is seen on CTH  - Stroke education  - EP c/s for ILR 3/27    #Headache  - Decadron 1 mg every 6 hours   - Topiramate increased from 25mg to 50 mg once daily on 3/26   - d/c tramadol 2/2 sedation  - can also receive PRN IV tylenol for pain    Cards  #HTN  - permissive hypertension, Goal -180  - hold home blood pressure medication for now  - Echo w/bubble: Aortic sclerosis without significant stenosis.  An annuloplasty ring is noted in the mitral position. Mean   transvalvular gradient is 2.30 mmHg at a heart rate of 67 bpm.  < from: DEBBIE w/Doppler (03.26.24 @ 14:01) >   1. Normal left and right ventricular size and systolic function.   2. No LA/RA/DANIEL/RAA thrombus seen.   3. No evidence of an intracardiac shunt.   4. Mild aortic regurgitation.   5. An annuloplasty ring is noted in the mitral position. The mean   transvalvular gradient is 2.00 mmHg at a heart rate of 68 bpm. There is   trace mitral regurgitation.   6. No echo evidence of pulmonary HTN.   7. No pericardial effusion.    #HLD  - high dose statin as above in CVA  - LDL results: 59 mg/dL    Pulm  - call provider if SPO2 < 94%    GI  #Nutrition/Fluids/Electrolytes   - replete K<4 and Mg <2  - Diet: Soft and Bite-sized   - IVF: s/p NS 500cc bolus x 1 as SBP dropped to low 100s after standing the patient up, started maintenance fluids    Renal  - monitor and trend Cr    Infectious Disease  - CXR with Bilateral lower lobe linear opacities that likely represent atelectasis or less likely viral infection such as pelvic.  - f/u RVP    Endocrine  - A1C results: 5.5    - TSH results: 0.603      Urology  #BPH  - c/w finasteride and flomax    DVT Prophylaxis  - lovenox sq for DVT prophylaxis   - SCDs for DVT prophylaxis       IDR Goals: Goals reviewed at interdisciplinary rounds with case management, social work, physical therapy, occupational therapy, and speech language pathology.   Please see specific therapy  notes for in depth goals.  Dispo: AR - can tolerate 3 hours of therapy     Discussed daily hospital plans and goals with patient and family at bedside.  Discussed with Neurology Attending Dr. Schreiber.    72 y/o M w/PMHX of HTN, BPH, HLD, mitral valve repair present to ED with left sided weakness, left facial droop, and left gaze preference, s/p TNK (3/24/24). Initial NIHSS 17 after TNK was administered, the score was reduced to 15. Stroke code imaging demonstrates right M1 occlusion, now s/p thrombectomy Right M1/2, TICI 0 to 2B (3/24/24). 24-hour CTH post-TNK/thrombectomy demonstrated acute Right MCA infarct, consistent with initial CTH and no acute hemorrhage seen. NIHSS on 3/25/24 improved to 3.     Neuro  #Right MCA infarct status post TNK and thrombectomy with Initial NIHSS 17 improved to 3.   - continue on aspirin 81mg and plavix 75mg daily  - continue atorvastatin 80mg daily  - q4hr stroke neuro checks and vitals  - Stroke Code HCT Results: No gross acute intracranial hemorrhage or mass effect. Hyperdense right M1 MCA, concerning for thrombosis.  CTA COW: Abrupt right M1 occlusion with M2 reconstitution.  CTA NECK: Patent, ECAs, ICAs, no  hemodynamically significant stenosis at ICA origins by NASCET criteria. Bilateral vertebral arteries are patent without flow limiting stenosis.  - no need for MRI as stroke is seen on CTH  - Stroke education  - EP c/s for ILR 3/27    #Headache  - Decadron 1 mg every 6 hours, FS bid while on steroids  - Topiramate increased from 25mg to 50 mg once daily on 3/26   - d/c tramadol 2/2 sedation  - can also receive PRN IV tylenol for pain    Cards  #HTN  - permissive hypertension, Goal -180  - hold home blood pressure medication for now  - Echo w/bubble: Aortic sclerosis without significant stenosis.  An annuloplasty ring is noted in the mitral position. Mean   transvalvular gradient is 2.30 mmHg at a heart rate of 67 bpm.  < from: DEBBIE w/Doppler (03.26.24 @ 14:01) >   1. Normal left and right ventricular size and systolic function.   2. No LA/RA/DANIEL/RAA thrombus seen.   3. No evidence of an intracardiac shunt.   4. Mild aortic regurgitation.   5. An annuloplasty ring is noted in the mitral position. The mean   transvalvular gradient is 2.00 mmHg at a heart rate of 68 bpm. There is   trace mitral regurgitation.   6. No echo evidence of pulmonary HTN.   7. No pericardial effusion.    #HLD  - high dose statin as above in CVA  - LDL results: 59 mg/dL    Pulm  - call provider if SPO2 < 94%    GI  #Nutrition/Fluids/Electrolytes   - replete K<4 and Mg <2  - Diet: Soft and Bite-sized   - IVF: s/p NS 500cc bolus x 1 as SBP dropped to low 100s after standing the patient up, started maintenance fluids    Renal  - monitor and trend Cr    Infectious Disease  - CXR with Bilateral lower lobe linear opacities that likely represent atelectasis or less likely viral infection such as pelvic.  - f/u RVP    Endocrine  - A1C results: 5.5    - TSH results: 0.603      Urology  #BPH  - c/w finasteride and flomax    DVT Prophylaxis  - lovenox sq for DVT prophylaxis   - SCDs for DVT prophylaxis       IDR Goals: Goals reviewed at interdisciplinary rounds with case management, social work, physical therapy, occupational therapy, and speech language pathology.   Please see specific therapy  notes for in depth goals.  Dispo: AR - can tolerate 3 hours of therapy     Discussed daily hospital plans and goals with patient and family at bedside.  Discussed with Neurology Attending Dr. Schreiber.

## 2024-03-26 NOTE — PATIENT PROFILE ADULT - FALL HARM RISK - HARM RISK INTERVENTIONS
Assistance with ambulation/Assistance OOB with selected safe patient handling equipment/Communicate Risk of Fall with Harm to all staff/Discuss with provider need for PT consult/Monitor for mental status changes/Monitor gait and stability/Move patient closer to nurses' station/Provide patient with walking aids - walker, cane, crutches/Reinforce activity limits and safety measures with patient and family/Reorient to person, place and time as needed/Tailored Fall Risk Interventions/Toileting schedule using arm’s reach rule for commode and bathroom/Use of alarms - bed, chair and/or voice tab/Visual Cue: Yellow wristband and red socks/Bed in lowest position, wheels locked, appropriate side rails in place/Call bell, personal items and telephone in reach/Instruct patient to call for assistance before getting out of bed or chair/Non-slip footwear when patient is out of bed/[facility] to call system/Physically safe environment - no spills, clutter or unnecessary equipment/Purposeful Proactive Rounding/Room/bathroom lighting operational, light cord in reach

## 2024-03-26 NOTE — CONSULT NOTE ADULT - PROBLEM SELECTOR RECOMMENDATION 5
101.7 (rectal) O/N, no localizing signs or sx of infection; UA negative; CXR shows LLL linear atelectasis (prelim read)  -- monitor off abx for now  -- f/u CXR final read  -- f/u cultures

## 2024-03-27 LAB
ALBUMIN SERPL ELPH-MCNC: 3.5 G/DL — SIGNIFICANT CHANGE UP (ref 3.3–5)
ALP SERPL-CCNC: 76 U/L — SIGNIFICANT CHANGE UP (ref 40–120)
ALT FLD-CCNC: 10 U/L — SIGNIFICANT CHANGE UP (ref 10–45)
ANION GAP SERPL CALC-SCNC: 8 MMOL/L — SIGNIFICANT CHANGE UP (ref 5–17)
ANION GAP SERPL CALC-SCNC: 8 MMOL/L — SIGNIFICANT CHANGE UP (ref 5–17)
APTT BLD: 27.2 SEC — SIGNIFICANT CHANGE UP (ref 24.5–35.6)
AST SERPL-CCNC: 23 U/L — SIGNIFICANT CHANGE UP (ref 10–40)
BILIRUB SERPL-MCNC: 0.6 MG/DL — SIGNIFICANT CHANGE UP (ref 0.2–1.2)
BUN SERPL-MCNC: 8 MG/DL — SIGNIFICANT CHANGE UP (ref 7–23)
BUN SERPL-MCNC: 9 MG/DL — SIGNIFICANT CHANGE UP (ref 7–23)
CALCIUM SERPL-MCNC: 8.8 MG/DL — SIGNIFICANT CHANGE UP (ref 8.4–10.5)
CALCIUM SERPL-MCNC: 9 MG/DL — SIGNIFICANT CHANGE UP (ref 8.4–10.5)
CHLORIDE SERPL-SCNC: 102 MMOL/L — SIGNIFICANT CHANGE UP (ref 96–108)
CHLORIDE SERPL-SCNC: 103 MMOL/L — SIGNIFICANT CHANGE UP (ref 96–108)
CO2 SERPL-SCNC: 24 MMOL/L — SIGNIFICANT CHANGE UP (ref 22–31)
CO2 SERPL-SCNC: 25 MMOL/L — SIGNIFICANT CHANGE UP (ref 22–31)
CREAT SERPL-MCNC: 0.56 MG/DL — SIGNIFICANT CHANGE UP (ref 0.5–1.3)
CREAT SERPL-MCNC: 0.69 MG/DL — SIGNIFICANT CHANGE UP (ref 0.5–1.3)
EGFR: 105 ML/MIN/1.73M2 — SIGNIFICANT CHANGE UP
EGFR: 98 ML/MIN/1.73M2 — SIGNIFICANT CHANGE UP
GLUCOSE BLDC GLUCOMTR-MCNC: 115 MG/DL — HIGH (ref 70–99)
GLUCOSE SERPL-MCNC: 125 MG/DL — HIGH (ref 70–99)
GLUCOSE SERPL-MCNC: 129 MG/DL — HIGH (ref 70–99)
HCT VFR BLD CALC: 41.5 % — SIGNIFICANT CHANGE UP (ref 39–50)
HGB BLD-MCNC: 14 G/DL — SIGNIFICANT CHANGE UP (ref 13–17)
INR BLD: 0.93 — SIGNIFICANT CHANGE UP (ref 0.85–1.18)
MAGNESIUM SERPL-MCNC: 2.1 MG/DL — SIGNIFICANT CHANGE UP (ref 1.6–2.6)
MCHC RBC-ENTMCNC: 33.5 PG — SIGNIFICANT CHANGE UP (ref 27–34)
MCHC RBC-ENTMCNC: 33.7 GM/DL — SIGNIFICANT CHANGE UP (ref 32–36)
MCV RBC AUTO: 99.3 FL — SIGNIFICANT CHANGE UP (ref 80–100)
NRBC # BLD: 0 /100 WBCS — SIGNIFICANT CHANGE UP (ref 0–0)
PHOSPHATE SERPL-MCNC: 3.6 MG/DL — SIGNIFICANT CHANGE UP (ref 2.5–4.5)
PLATELET # BLD AUTO: 155 K/UL — SIGNIFICANT CHANGE UP (ref 150–400)
POTASSIUM SERPL-MCNC: 4 MMOL/L — SIGNIFICANT CHANGE UP (ref 3.5–5.3)
POTASSIUM SERPL-MCNC: 4.5 MMOL/L — SIGNIFICANT CHANGE UP (ref 3.5–5.3)
POTASSIUM SERPL-SCNC: 4 MMOL/L — SIGNIFICANT CHANGE UP (ref 3.5–5.3)
POTASSIUM SERPL-SCNC: 4.5 MMOL/L — SIGNIFICANT CHANGE UP (ref 3.5–5.3)
PROT SERPL-MCNC: 5.7 G/DL — LOW (ref 6–8.3)
PROTHROM AB SERPL-ACNC: 10.6 SEC — SIGNIFICANT CHANGE UP (ref 9.5–13)
RBC # BLD: 4.18 M/UL — LOW (ref 4.2–5.8)
RBC # FLD: 12.3 % — SIGNIFICANT CHANGE UP (ref 10.3–14.5)
SODIUM SERPL-SCNC: 135 MMOL/L — SIGNIFICANT CHANGE UP (ref 135–145)
SODIUM SERPL-SCNC: 135 MMOL/L — SIGNIFICANT CHANGE UP (ref 135–145)
WBC # BLD: 7.46 K/UL — SIGNIFICANT CHANGE UP (ref 3.8–10.5)
WBC # FLD AUTO: 7.46 K/UL — SIGNIFICANT CHANGE UP (ref 3.8–10.5)

## 2024-03-27 PROCEDURE — 99233 SBSQ HOSP IP/OBS HIGH 50: CPT

## 2024-03-27 RX ADMIN — FINASTERIDE 5 MILLIGRAM(S): 5 TABLET, FILM COATED ORAL at 09:56

## 2024-03-27 RX ADMIN — ATORVASTATIN CALCIUM 80 MILLIGRAM(S): 80 TABLET, FILM COATED ORAL at 21:09

## 2024-03-27 RX ADMIN — Medication 100 MILLIGRAM(S): at 05:26

## 2024-03-27 RX ADMIN — TAMSULOSIN HYDROCHLORIDE 0.4 MILLIGRAM(S): 0.4 CAPSULE ORAL at 21:09

## 2024-03-27 RX ADMIN — Medication 100 MILLIGRAM(S): at 14:44

## 2024-03-27 RX ADMIN — Medication 1 MILLIGRAM(S): at 05:27

## 2024-03-27 RX ADMIN — SODIUM CHLORIDE 310 MILLILITER(S): 9 INJECTION INTRAMUSCULAR; INTRAVENOUS; SUBCUTANEOUS at 01:00

## 2024-03-27 RX ADMIN — SENNA PLUS 2 TABLET(S): 8.6 TABLET ORAL at 21:10

## 2024-03-27 RX ADMIN — CLOPIDOGREL BISULFATE 75 MILLIGRAM(S): 75 TABLET, FILM COATED ORAL at 09:55

## 2024-03-27 RX ADMIN — Medication 100 MILLIGRAM(S): at 21:09

## 2024-03-27 RX ADMIN — Medication 1 MILLIGRAM(S): at 21:17

## 2024-03-27 RX ADMIN — Medication 81 MILLIGRAM(S): at 09:55

## 2024-03-27 RX ADMIN — Medication 50 MILLIGRAM(S): at 21:10

## 2024-03-27 RX ADMIN — LIDOCAINE 1 PATCH: 4 CREAM TOPICAL at 18:58

## 2024-03-27 RX ADMIN — ENOXAPARIN SODIUM 40 MILLIGRAM(S): 100 INJECTION SUBCUTANEOUS at 15:54

## 2024-03-27 RX ADMIN — Medication 1 MILLIGRAM(S): at 18:09

## 2024-03-27 RX ADMIN — REMDESIVIR 200 MILLIGRAM(S): 5 INJECTION INTRAVENOUS at 00:33

## 2024-03-27 RX ADMIN — POLYETHYLENE GLYCOL 3350 17 GRAM(S): 17 POWDER, FOR SOLUTION ORAL at 09:55

## 2024-03-27 RX ADMIN — SODIUM CHLORIDE 100 MILLILITER(S): 9 INJECTION INTRAMUSCULAR; INTRAVENOUS; SUBCUTANEOUS at 05:17

## 2024-03-27 RX ADMIN — REMDESIVIR 200 MILLIGRAM(S): 5 INJECTION INTRAVENOUS at 21:17

## 2024-03-27 RX ADMIN — LIDOCAINE 1 PATCH: 4 CREAM TOPICAL at 06:00

## 2024-03-27 RX ADMIN — Medication 1 MILLIGRAM(S): at 12:09

## 2024-03-27 RX ADMIN — LIDOCAINE 1 PATCH: 4 CREAM TOPICAL at 18:09

## 2024-03-27 NOTE — PROGRESS NOTE ADULT - SUBJECTIVE AND OBJECTIVE BOX
Patient is a 73y old  Male who presents with a chief complaint of Acute Stroke (26 Mar 2024 13:38)    INTERVAL EVENTS:    SUBJECTIVE:  Patient was seen and examined at bedside. Feeling overall improved, no hypoxia. Started on Remdesivir. No other complaints or events reported. Telemetry reviewed     Review of systems: No fever, chills, dizziness, HA, Changes in vision, CP, dyspnea, nausea or vomiting, dysuria, changes in bowel movements, LE edema. Rest of 12 point Review of systems negative unless otherwise documented elsewhere in note.         MEDICATIONS:  MEDICATIONS  (STANDING):  aspirin enteric coated 81 milliGRAM(s) Oral daily  atorvastatin 80 milliGRAM(s) Oral at bedtime  benzonatate 100 milliGRAM(s) Oral every 8 hours  clopidogrel Tablet 75 milliGRAM(s) Oral daily  dexAMETHasone  Injectable 1 milliGRAM(s) IV Push every 6 hours  enoxaparin Injectable 40 milliGRAM(s) SubCutaneous every 24 hours  finasteride 5 milliGRAM(s) Oral daily  lidocaine   4% Patch 1 Patch Transdermal every 24 hours  polyethylene glycol 3350 17 Gram(s) Oral daily  remdesivir  IVPB 100 milliGRAM(s) IV Intermittent once  senna 2 Tablet(s) Oral at bedtime  sodium chloride 0.9%. 1000 milliLiter(s) (100 mL/Hr) IV Continuous <Continuous>  tamsulosin 0.4 milliGRAM(s) Oral at bedtime  topiramate 50 milliGRAM(s) Oral every 24 hours    MEDICATIONS  (PRN):  acetaminophen     Tablet .. 650 milliGRAM(s) Oral every 6 hours PRN Temp greater or equal to 38C (100.4F), Mild Pain (1 - 3)  benzocaine/menthol Lozenge 1 Lozenge Oral three times a day PRN Sore Throat  ondansetron Injectable 4 milliGRAM(s) IV Push every 8 hours PRN Nausea and/or Vomiting      Allergies    Allergy Status Unknown    Intolerances        OBJECTIVE:  Vital Signs Last 24 Hrs  T(C): 36.6 (27 Mar 2024 09:17), Max: 36.8 (26 Mar 2024 22:43)  T(F): 97.8 (27 Mar 2024 09:17), Max: 98.2 (26 Mar 2024 22:43)  HR: 89 (27 Mar 2024 10:05) (54 - 89)  BP: 118/65 (27 Mar 2024 10:05) (118/65 - 133/78)  BP(mean): 83 (27 Mar 2024 10:05) (83 - 102)  RR: 14 (27 Mar 2024 10:05) (14 - 22)  SpO2: 97% (27 Mar 2024 10:05) (96% - 97%)    Parameters below as of 27 Mar 2024 10:05  Patient On (Oxygen Delivery Method): room air      I&O's Summary    26 Mar 2024 07:01  -  27 Mar 2024 07:00  --------------------------------------------------------  IN: 1525 mL / OUT: 220 mL / NET: 1305 mL    27 Mar 2024 07:01  -  27 Mar 2024 13:09  --------------------------------------------------------  IN: 500 mL / OUT: 0 mL / NET: 500 mL        PHYSICAL EXAM:  General: AO, NAD, sitting in bed, no labored breathing, on RA  HEENT: AT/NC  Lungs: good air entry, no crackles, no wheezes  Heart: RRR  Abdomen: soft, no tenderess  Extremities:  warm, no edema, no tenderness     LABS:                        14.0   7.46  )-----------( 155      ( 27 Mar 2024 06:25 )             41.5     03-27    135  |  102  |  9   ----------------------------<  129<H>  4.5   |  25  |  0.69    Ca    9.0      27 Mar 2024 06:25  Phos  3.6     03-27  Mg     2.1     03-27    TPro  5.7<L>  /  Alb  3.5  /  TBili  0.6  /  DBili  x   /  AST  23  /  ALT  10  /  AlkPhos  76  03-26    LIVER FUNCTIONS - ( 26 Mar 2024 23:18 )  Alb: 3.5 g/dL / Pro: 5.7 g/dL / ALK PHOS: 76 U/L / ALT: 10 U/L / AST: 23 U/L / GGT: x           PT/INR - ( 27 Mar 2024 06:25 )   PT: 10.6 sec;   INR: 0.93          PTT - ( 27 Mar 2024 06:25 )  PTT:27.2 sec  CAPILLARY BLOOD GLUCOSE      POCT Blood Glucose.: 107 mg/dL (26 Mar 2024 21:11)  POCT Blood Glucose.: 190 mg/dL (26 Mar 2024 16:24)    Urinalysis Basic - ( 27 Mar 2024 06:25 )    Color: x / Appearance: x / SG: x / pH: x  Gluc: 129 mg/dL / Ketone: x  / Bili: x / Urobili: x   Blood: x / Protein: x / Nitrite: x   Leuk Esterase: x / RBC: x / WBC x   Sq Epi: x / Non Sq Epi: x / Bacteria: x        MICRODATA:    Urinalysis with Rflx Culture (collected 25 Mar 2024 21:58)        RADIOLOGY/OTHER STUDIES:

## 2024-03-27 NOTE — SPEECH LANGUAGE PATHOLOGY EVALUATION - SLP DIAGNOSIS
Per the [facility], severe cognitive linguistic deficits in the areas of mental flexibility, delayed recall, problem solving/math problems, and executive functioning. Patient would benefit from additional work up assessment of cognitive linguistic ability. Presentation c/w right hemisphere involvement. Anticipate improvement in cognitive linguistic deficits with ongoing neuro recovery and speech/lang intervention to maximize functional outcomes. Per the [facility], severe cognitive linguistic deficits in the areas of mental flexibility, delayed recall, problem solving/math problems, and executive functioning. Patient benefited from segmentation of information and repetition. Patient would benefit from additional work up assessment of cognitive linguistic ability. Presentation c/w right hemisphere involvement. Anticipate improvement in cognitive linguistic deficits with ongoing neuro recovery and speech/lang intervention to maximize functional outcomes.

## 2024-03-27 NOTE — SPEECH LANGUAGE PATHOLOGY EVALUATION - COMMENTS
The 3 oz water challenge is a swallow screen which if passed has a predictive rate of 96% sensitivity for identifying individuals safe to swallow (Leif et al 2008): PASSED.   Patient is currently on a soft bite sized and thin liquid diet. Patient requested a regular consistency diet. Neuro PA made aware. The [employer] Mental Status ([facility]) Examination The [employer] [place] [employer] Mental Status (UMS) Examination (2006) was administered. The UMS is designed to assess the presence of cognitive dysfunction. It assesses cognitive domains including: orientation, attention, short term and working memory, recognition, calculations, visuospatial skills, and executive function.    Attention, immediate recall, orientation (Questions 1-3): 3/3  Numeric calculations and registration (Question 5): 0/3  Memory (Question 6): 2/3  Delayed recall with inference (Questions 4 and 7):  3/5  Registration (Question 8): 1/2  Visuospatial (Question 9 -10) 2/6  Executive function (Question 11): 6/8    Total Score 17/30 DNT LT: Patient will communicate wants/needs.  ST: 1) Patient will complete functional short term memory tasks with 80% accuracy with moderate cues.   2) Patient will answer simple open ended questions to a short story with 80% accuracy with moderate cues.  3) Patient will complete mental manipulation/mental flexibility tasks with 80% accuracy with moderate cues.   4) Patient will complete simple mathematical/word problem solving tasks with 80% accuracy with moderate cues. No dysarthria noted in conversation The 3 oz water challenge is a swallow screen which if passed has a predictive rate of 96% sensitivity for identifying individuals safe to swallow (Leif et al 2008): PASSED.   Patient is currently on a soft bite sized and thin liquid diet. Patient requested a regular consistency diet. Neuro PA made aware.    PLOF: Patient is a physician of transfusion medicine. Does not actively drive. He reported independence with money and medication management. The [employer] Mental Status ([facility]) Examination The [employer] [place] [employer] Mental Status ([facility]) Examination (2006) was administered. The UMS is designed to assess the presence of cognitive dysfunction. It assesses cognitive domains including: orientation, attention, short term and working memory, recognition, calculations, visuospatial skills, and executive function.    Attention, immediate recall, orientation (Questions 1-3): 3/3  Numeric calculations and registration (Question 5): 0/3  Memory (Question 6): 2/3  Delayed recall with inference (Questions 4 and 7):  3/5  Registration (Question 8): 1/2  Visuospatial (Question 9 -10) 2/6  Executive function (Question 11): 6/8    Total Score 17/30  Range        High School Education or greater    Less than High School Education  Normal                  27-30                                      20-30  Mild cog def          20-26                      14-19  Sev cog def           1-19                                         1-14 The [employer] Mental Status ([facility]) Examination The [employer] [place] [employer] Mental Status ([facility]) Examination (2006) was administered. The UMS is designed to assess the presence of cognitive dysfunction. It assesses cognitive domains including: orientation, attention, short term and working memory, recognition, calculations, visuospatial skills, and executive function.    Attention, immediate recall, orientation (Questions 1-3): 3/3  Numeric calculations and registration (Question 5): 0/3  Memory (Question 6): 2/3  Delayed recall with inference (Questions 4 and 7):  3/5  Registration (Question 8): 1/2  Visuospatial (Question 9 -10) 2/6  Executive function (Question 11): 6/8    Total Score 17/30  Range        High School Education or greater    Less than High School Education  Normal       27-30                            20-30  Mild cog def    20-26                      14-19  Sev cog def     1-19                          1-14

## 2024-03-27 NOTE — PROGRESS NOTE ADULT - ASSESSMENT
74 y/o M w/PMHX of HTN, BPH, HLD, mitral valve repair present to ED with left sided weakness, left facial droop, and left gaze preference, s/p TNK (3/24/24). Initial NIHSS 17 after TNK was administered, the score was reduced to 15. Stroke code imaging demonstrates right M1 occlusion, now s/p thrombectomy Right M1/2, TICI 0 to 2B (3/24/24). 24-hour CTH post-TNK/thrombectomy demonstrated acute Right MCA infarct, consistent with initial CTH and no acute hemorrhage seen. NIHSS on 3/25/24 improved to 3.     Neuro  #Right MCA infarct status post TNK and thrombectomy with Initial NIHSS 17 improved to 3.   - continue on aspirin 81mg and plavix 75mg daily  - continue atorvastatin 80mg daily  - q4hr stroke neuro checks and vitals  - Stroke Code HCT Results: No gross acute intracranial hemorrhage or mass effect. Hyperdense right M1 MCA, concerning for thrombosis.  CTA COW: Abrupt right M1 occlusion with M2 reconstitution.  CTA NECK: Patent, ECAs, ICAs, no  hemodynamically significant stenosis at ICA origins by NASCET criteria. Bilateral vertebral arteries are patent without flow limiting stenosis.  - no need for MRI as stroke is seen on CTH  - Stroke education  - EP c/s for ILR placement - pending.     #Headache  - Decadron 1 mg every 6 hours, FS bid while on steroids  - Topiramate increased from 25mg to 50 mg once daily on 3/26   - d/c tramadol 2/2 sedation  - can also receive PRN IV tylenol for pain    Cards  #HTN  - permissive hypertension, Goal -180  - hold home blood pressure medication for now  - Echo w/bubble: Aortic sclerosis without significant stenosis.  An annuloplasty ring is noted in the mitral position. Mean   transvalvular gradient is 2.30 mmHg at a heart rate of 67 bpm.  < from: DEBBIE w/Doppler (03.26.24 @ 14:01) >   1. Normal left and right ventricular size and systolic function.   2. No LA/RA/DANIEL/RAA thrombus seen.   3. No evidence of an intracardiac shunt.   4. Mild aortic regurgitation.   5. An annuloplasty ring is noted in the mitral position. The mean   transvalvular gradient is 2.00 mmHg at a heart rate of 68 bpm. There is   trace mitral regurgitation.   6. No echo evidence of pulmonary HTN.   7. No pericardial effusion.    #HLD  - high dose statin as above in CVA  - LDL results: 59 mg/dL    Pulm  - call provider if SPO2 < 94%    GI  #Nutrition/Fluids/Electrolytes   - replete K<4 and Mg <2  - Diet: Soft and Bite-sized   - IVF: s/p NS 500cc bolus x 1 as SBP dropped to low 100s after standing the patient up, started maintenance fluids    Renal  - monitor and trend Cr    Infectious Disease  - CXR with Bilateral lower lobe linear opacities that likely represent atelectasis or less likely viral infection such as pelvic.    #COVID   - Remdesevir x 3 days     Endocrine  - A1C results: 5.5    - TSH results: 0.603      Urology  #BPH  - c/w finasteride and flomax    DVT Prophylaxis  - lovenox sq for DVT prophylaxis   - SCDs for DVT prophylaxis       IDR Goals: Goals reviewed at interdisciplinary rounds with case management, social work, physical therapy, occupational therapy, and speech language pathology.   Please see specific therapy  notes for in depth goals.  Dispo: AR - can tolerate 3 hours of therapy     Discussed daily hospital plans and goals with patient and family at bedside.  Discussed with Neurology Attending Dr. Schreiber and Neurology Fellow Dr. Sherwood.    72 y/o M w/PMHX of HTN, BPH, HLD, mitral valve repair present to ED with left sided weakness, left facial droop, and left gaze preference, s/p TNK (3/24/24). Initial NIHSS 17 after TNK was administered, the score was reduced to 15. Stroke code imaging demonstrates right M1 occlusion, now s/p thrombectomy Right M1/2, TICI 0 to 2B (3/24/24). 24-hour CTH post-TNK/thrombectomy demonstrated acute Right MCA infarct, consistent with initial CTH and no acute hemorrhage seen. NIHSS on 3/25/24 improved to 3.     Neuro  #Right MCA infarct status post TNK and thrombectomy with Initial NIHSS 17 improved to 3.   - continue on aspirin 81mg and plavix 75mg daily  - continue atorvastatin 80mg daily  - q4hr stroke neuro checks and vitals  - Stroke Code HCT Results: No gross acute intracranial hemorrhage or mass effect. Hyperdense right M1 MCA, concerning for thrombosis.  CTA COW: Abrupt right M1 occlusion with M2 reconstitution.  CTA NECK: Patent, ECAs, ICAs, no  hemodynamically significant stenosis at ICA origins by NASCET criteria. Bilateral vertebral arteries are patent without flow limiting stenosis.  - no need for MRI as stroke is seen on CTH  - Stroke education  - EP c/s for ILR placement - pending.     #Headache  - Decadron 1 mg every 6 hours, FS bid while on steroids  - Topiramate increased from 25mg to 50 mg once daily on 3/26   - d/c tramadol 2/2 sedation  - can also receive PRN IV tylenol for pain    Cards  #HTN  - permissive hypertension, Goal -180  - hold home blood pressure medication for now  - Echo w/bubble: Aortic sclerosis without significant stenosis.  An annuloplasty ring is noted in the mitral position. Mean   transvalvular gradient is 2.30 mmHg at a heart rate of 67 bpm.  < from: DEBBIE w/Doppler (03.26.24 @ 14:01) >   1. Normal left and right ventricular size and systolic function.   2. No LA/RA/DANIEL/RAA thrombus seen.   3. No evidence of an intracardiac shunt.   4. Mild aortic regurgitation.   5. An annuloplasty ring is noted in the mitral position. The mean   transvalvular gradient is 2.00 mmHg at a heart rate of 68 bpm. There is   trace mitral regurgitation.   6. No echo evidence of pulmonary HTN.   7. No pericardial effusion.    #HLD  - high dose statin as above in CVA  - LDL results: 59 mg/dL    Pulm  - call provider if SPO2 < 94%    GI  #Nutrition/Fluids/Electrolytes   - replete K<4 and Mg <2  - Diet: Soft and Bite-sized   - IVF: s/p NS 500cc bolus x 1 as SBP dropped to low 100s after standing the patient up, started maintenance fluids    Renal  - monitor and trend Cr    Infectious Disease  - CXR with Bilateral lower lobe linear opacities that likely represent atelectasis or less likely viral infection.    #COVID   - Remdesevir x 3 days     Endocrine  - A1C results: 5.5    - TSH results: 0.603      Urology  #BPH  - c/w finasteride and flomax    DVT Prophylaxis  - lovenox sq for DVT prophylaxis   - SCDs for DVT prophylaxis       IDR Goals: Goals reviewed at interdisciplinary rounds with case management, social work, physical therapy, occupational therapy, and speech language pathology.   Please see specific therapy  notes for in depth goals.  Dispo: AR - can tolerate 3 hours of therapy     Discussed daily hospital plans and goals with patient and family at bedside.  Discussed with Neurology Attending Dr. Schreiber and Neurology Fellow Dr. Sherwood.

## 2024-03-27 NOTE — SPEECH LANGUAGE PATHOLOGY EVALUATION - SLP PERTINENT HISTORY OF CURRENT PROBLEM
PMHX of HTN, BPH, HLD, mitral valve repair who presented to the ED on 3/24/24 with left sided weakness, left facial droop, and left gaze preference, s/p TNK (3/24/24). Initial NIHSS 17 after TNK was administered, the score was reduced to 15. Stroke code imaging demonstrates right M1 occlusion, now s/p thrombectomy Right M1/2, TICI 0 to 2B (3/24/24). 24-hour CTH post-TNK/thrombectomy demonstrated acute Right MCA infarct, consistent with initial CTH and no acute hemorrhage seen. NIHSS on 3/25/24 improved to 3.

## 2024-03-27 NOTE — PROGRESS NOTE ADULT - ASSESSMENT
72 y/o M w/       Problem/Recommendation - 1:  ·  Problem: CVA (cerebrovascular accident).   ·  Recommendation: Pt. c/o L-sided weakness and HA, c/f stroke; Pt. received TNK; CTH reviewed, shows acute R MCA infarct  -- cont. work-up and mgmt per Neuro  -- PT/OT  -- on DAPT + high-intensity statin  -- s/p DEBBIE, possible ILR placement.     Problem/Recommendation - 2:  ·  Problem: Essential hypertension.   ·  Recommendation: BP goal per Neuro.     Problem/Recommendation - 3:  ·  Problem: Hyperlipidemia.   ·  Recommendation: LDL 59  -- cont. statin.     Problem/Recommendation - 4:  ·  Problem: BPH (benign prostatic hyperplasia).   ·  Recommendation: chronic  -- cont. Flomax + Proscar.     Problem/Recommendation - 5:  ·  Problem: COVID infection .   ·  Recommendation: Started on Remdesivir, no hypoxia. Continue to monitor 02 requirement, if hypoxia, would start on Dexamethasone.  AC per primary team      Problem/Recommendation -6 RESOLVED   ·  Problem: Thrombocytopenia.   ·  Recommendation: unclear etiology; HCV Ab negative  -- m    DVT ppx: SQH  Discussed with primary team    74 y/o M w/       Problem/Recommendation - 1:  ·  Problem: CVA (cerebrovascular accident).   ·  Recommendation: Pt. c/o L-sided weakness and HA, c/f stroke; Pt. received TNK; CTH reviewed, shows acute R MCA infarct  -- cont. work-up and mgmt per Neuro  -- PT/OT  -- on DAPT + high-intensity statin  -- s/p DEBBIE, possible ILR placement.     Problem/Recommendation - 2:  ·  Problem: Essential hypertension.   ·  Recommendation: BP goal per Neuro.     Problem/Recommendation - 3:  ·  Problem: Hyperlipidemia.   ·  Recommendation: LDL 59  -- cont. statin.     Problem/Recommendation - 4:  ·  Problem: BPH (benign prostatic hyperplasia).   ·  Recommendation: chronic  -- cont. Flomax + Proscar.     Problem/Recommendation - 5:  ·  Problem: COVID infection .   ·  Recommendation: Started on Remdesivir, no hypoxia. Continue to monitor 02 requirement, if hypoxia will start Dexamethasone 6 mg daily. Monitro LFTs and creatinine while on Remdesivir   AC per primary team      Problem/Recommendation -6 RESOLVED   ·  Problem: Thrombocytopenia.   ·  Recommendation: unclear etiology; HCV Ab negative  -- m    DVT ppx: SQH  Discussed with primary team

## 2024-03-27 NOTE — PROGRESS NOTE ADULT - SUBJECTIVE AND OBJECTIVE BOX
INTERVAL HPI/OVERNIGHT EVENTS:  Patient seen and examined.  Patient was A&Ox3 and was cooperative throughout the exam. He no longer complains of a headache and is much more alert today compared to yesterday.     MEDICATIONS  (STANDING):  aspirin enteric coated 81 milliGRAM(s) Oral daily  atorvastatin 80 milliGRAM(s) Oral at bedtime  clopidogrel Tablet 75 milliGRAM(s) Oral daily  dexAMETHasone  IVPB 1 milliGRAM(s) IV Intermittent every 6 hours  finasteride 5 milliGRAM(s) Oral daily  insulin lispro (ADMELOG) corrective regimen sliding scale   SubCutaneous Before meals and at bedtime  polyethylene glycol 3350 17 Gram(s) Oral daily  senna 2 Tablet(s) Oral at bedtime  tamsulosin 0.4 milliGRAM(s) Oral at bedtime  topiramate 50 milliGRAM(s) Oral every 24 hours    MEDICATIONS  (PRN):  acetaminophen     Tablet .. 650 milliGRAM(s) Oral every 6 hours PRN Temp greater or equal to 38C (100.4F), Mild Pain (1 - 3)  ondansetron Injectable 4 milliGRAM(s) IV Push every 8 hours PRN Nausea and/or Vomiting      Allergies    Allergy Status Unknown    Intolerances        Vital Signs Last 24 Hrs  T(C): 36.6 (26 Mar 2024 10:41), Max: 38.7 (25 Mar 2024 21:20)  T(F): 97.8 (26 Mar 2024 10:41), Max: 101.7 (25 Mar 2024 21:20)  HR: 62 (26 Mar 2024 10:25) (56 - 75)  BP: 113/70 (26 Mar 2024 10:25) (106/55 - 130/72)  BP(mean): 86 (26 Mar 2024 10:25) (75 - 94)  RR: 14 (26 Mar 2024 10:25) (14 - 19)  SpO2: 94% (26 Mar 2024 10:25) (94% - 97%)    Parameters below as of 26 Mar 2024 10:25  Patient On (Oxygen Delivery Method): room air        Physical exam:  General: No acute distress, awake and alert  Eyes: moist conjunctivae, see below for CNs  Neck: FROM  Cardiovascular: Regular rate and rhythm  Pulmonary: No use of accessory muscles  Extremities: no edema    Neurologic:  -Mental status: Awake, opens eyes and follows commands easily to voice, oriented to person, place, and time. Speech is fluent with intact naming, repetition, and comprehension, Mild dysarthria. Recent and remote memory intact. Follows commands.    -Cranial nerves:   III, IV, VI: Extraocular movements are intact without nystagmus. Pupils equally round and reactive to light  V:  Facial sensation V1-V3 equal and intact   VII: Left facial droop   Motor: Normal bulk and tone. Left upper extremity drift with no pronation observed. Left upper extremity 4/5, Left lower extremity 3/5. Right upper and lower extremity 5/5.   Sensation: Intact to light touch bilaterally. No neglect or extinction on double simultaneous testing.  Coordination: No dysmetria on finger-to-nose bilaterally    LABS:                        12.4   5.56  )-----------( 130      ( 26 Mar 2024 05:30 )             37.5     03-26    136  |  104  |  8   ----------------------------<  91  3.7   |  28  |  0.66    Ca    8.8      26 Mar 2024 05:30  Phos  3.1     03-26  Mg     1.8     03-26    TPro  5.6<L>  /  Alb  3.8  /  TBili  0.8  /  DBili  x   /  AST  17  /  ALT  10  /  AlkPhos  92  03-24    PT/INR - ( 24 Mar 2024 16:33 )   PT: 11.5 sec;   INR: 1.01          PTT - ( 24 Mar 2024 16:33 )  PTT:24.9 sec  Urinalysis Basic - ( 26 Mar 2024 05:30 )    Color: x / Appearance: x / SG: x / pH: x  Gluc: 91 mg/dL / Ketone: x  / Bili: x / Urobili: x   Blood: x / Protein: x / Nitrite: x   Leuk Esterase: x / RBC: x / WBC x   Sq Epi: x / Non Sq Epi: x / Bacteria: x        RADIOLOGY & ADDITIONAL TESTS:  Reviewed.

## 2024-03-27 NOTE — SPEECH LANGUAGE PATHOLOGY EVALUATION - SLP GENERAL OBSERVATIONS
Patient was seen fully awake and alert, HOB fully elevated, on room air. A&Ox3, followed simple directives, and communicated wants/needs. Mild dysphonia marked by weak, breathy, and rough vocal quality.

## 2024-03-28 LAB
ANION GAP SERPL CALC-SCNC: 6 MMOL/L — SIGNIFICANT CHANGE UP (ref 5–17)
B2 GLYCOPROT1 AB SER QL: NEGATIVE — SIGNIFICANT CHANGE UP
BUN SERPL-MCNC: 14 MG/DL — SIGNIFICANT CHANGE UP (ref 7–23)
CALCIUM SERPL-MCNC: 9.1 MG/DL — SIGNIFICANT CHANGE UP (ref 8.4–10.5)
CARDIOLIPIN AB SER-ACNC: NEGATIVE — SIGNIFICANT CHANGE UP
CHLORIDE SERPL-SCNC: 105 MMOL/L — SIGNIFICANT CHANGE UP (ref 96–108)
CO2 SERPL-SCNC: 25 MMOL/L — SIGNIFICANT CHANGE UP (ref 22–31)
CONFIRM APTT STACLOT: NEGATIVE — SIGNIFICANT CHANGE UP
CREAT SERPL-MCNC: 0.78 MG/DL — SIGNIFICANT CHANGE UP (ref 0.5–1.3)
DRVVT RATIO: 0.81 RATIO — SIGNIFICANT CHANGE UP (ref 0–1.03)
DRVVT SCREEN TO CONFIRM RATIO: SIGNIFICANT CHANGE UP
EGFR: 95 ML/MIN/1.73M2 — SIGNIFICANT CHANGE UP
GLUCOSE SERPL-MCNC: 180 MG/DL — HIGH (ref 70–99)
HCT VFR BLD CALC: 39.1 % — SIGNIFICANT CHANGE UP (ref 39–50)
HGB BLD-MCNC: 13.2 G/DL — SIGNIFICANT CHANGE UP (ref 13–17)
MAGNESIUM SERPL-MCNC: 2.1 MG/DL — SIGNIFICANT CHANGE UP (ref 1.6–2.6)
MCHC RBC-ENTMCNC: 33.1 PG — SIGNIFICANT CHANGE UP (ref 27–34)
MCHC RBC-ENTMCNC: 33.8 GM/DL — SIGNIFICANT CHANGE UP (ref 32–36)
MCV RBC AUTO: 98 FL — SIGNIFICANT CHANGE UP (ref 80–100)
NRBC # BLD: 0 /100 WBCS — SIGNIFICANT CHANGE UP (ref 0–0)
PHOSPHATE SERPL-MCNC: 2.5 MG/DL — SIGNIFICANT CHANGE UP (ref 2.5–4.5)
PLATELET # BLD AUTO: 156 K/UL — SIGNIFICANT CHANGE UP (ref 150–400)
POTASSIUM SERPL-MCNC: 3.5 MMOL/L — SIGNIFICANT CHANGE UP (ref 3.5–5.3)
POTASSIUM SERPL-SCNC: 3.5 MMOL/L — SIGNIFICANT CHANGE UP (ref 3.5–5.3)
RBC # BLD: 3.99 M/UL — LOW (ref 4.2–5.8)
RBC # FLD: 12.3 % — SIGNIFICANT CHANGE UP (ref 10.3–14.5)
SODIUM SERPL-SCNC: 136 MMOL/L — SIGNIFICANT CHANGE UP (ref 135–145)
WBC # BLD: 8.59 K/UL — SIGNIFICANT CHANGE UP (ref 3.8–10.5)
WBC # FLD AUTO: 8.59 K/UL — SIGNIFICANT CHANGE UP (ref 3.8–10.5)

## 2024-03-28 PROCEDURE — 99233 SBSQ HOSP IP/OBS HIGH 50: CPT

## 2024-03-28 RX ORDER — LANOLIN ALCOHOL/MO/W.PET/CERES
3 CREAM (GRAM) TOPICAL AT BEDTIME
Refills: 0 | Status: DISCONTINUED | OUTPATIENT
Start: 2024-03-28 | End: 2024-04-05

## 2024-03-28 RX ORDER — ACETAMINOPHEN 500 MG
1000 TABLET ORAL ONCE
Refills: 0 | Status: COMPLETED | OUTPATIENT
Start: 2024-03-28 | End: 2024-03-28

## 2024-03-28 RX ORDER — DEXAMETHASONE 0.5 MG/5ML
1 ELIXIR ORAL EVERY 6 HOURS
Refills: 0 | Status: DISCONTINUED | OUTPATIENT
Start: 2024-03-28 | End: 2024-03-28

## 2024-03-28 RX ORDER — ATORVASTATIN CALCIUM 80 MG/1
40 TABLET, FILM COATED ORAL AT BEDTIME
Refills: 0 | Status: DISCONTINUED | OUTPATIENT
Start: 2024-03-28 | End: 2024-04-05

## 2024-03-28 RX ORDER — LIDOCAINE 4 G/100G
1 CREAM TOPICAL DAILY
Refills: 0 | Status: DISCONTINUED | OUTPATIENT
Start: 2024-03-28 | End: 2024-04-05

## 2024-03-28 RX ADMIN — Medication 100 MILLIGRAM(S): at 06:01

## 2024-03-28 RX ADMIN — ATORVASTATIN CALCIUM 40 MILLIGRAM(S): 80 TABLET, FILM COATED ORAL at 21:09

## 2024-03-28 RX ADMIN — CLOPIDOGREL BISULFATE 75 MILLIGRAM(S): 75 TABLET, FILM COATED ORAL at 12:37

## 2024-03-28 RX ADMIN — Medication 100 MILLIGRAM(S): at 16:05

## 2024-03-28 RX ADMIN — REMDESIVIR 200 MILLIGRAM(S): 5 INJECTION INTRAVENOUS at 21:54

## 2024-03-28 RX ADMIN — SENNA PLUS 2 TABLET(S): 8.6 TABLET ORAL at 21:09

## 2024-03-28 RX ADMIN — ENOXAPARIN SODIUM 40 MILLIGRAM(S): 100 INJECTION SUBCUTANEOUS at 16:04

## 2024-03-28 RX ADMIN — Medication 400 MILLIGRAM(S): at 21:09

## 2024-03-28 RX ADMIN — Medication 1000 MILLIGRAM(S): at 21:24

## 2024-03-28 RX ADMIN — Medication 50 MILLIGRAM(S): at 21:09

## 2024-03-28 RX ADMIN — LIDOCAINE 1 PATCH: 4 CREAM TOPICAL at 12:37

## 2024-03-28 RX ADMIN — TAMSULOSIN HYDROCHLORIDE 0.4 MILLIGRAM(S): 0.4 CAPSULE ORAL at 21:09

## 2024-03-28 RX ADMIN — Medication 1 MILLIGRAM(S): at 17:15

## 2024-03-28 RX ADMIN — Medication 81 MILLIGRAM(S): at 12:36

## 2024-03-28 RX ADMIN — FINASTERIDE 5 MILLIGRAM(S): 5 TABLET, FILM COATED ORAL at 12:37

## 2024-03-28 RX ADMIN — LIDOCAINE 1 PATCH: 4 CREAM TOPICAL at 04:00

## 2024-03-28 RX ADMIN — Medication 1 MILLIGRAM(S): at 06:03

## 2024-03-28 RX ADMIN — Medication 3 MILLIGRAM(S): at 21:39

## 2024-03-28 RX ADMIN — SODIUM CHLORIDE 100 MILLILITER(S): 9 INJECTION INTRAMUSCULAR; INTRAVENOUS; SUBCUTANEOUS at 09:48

## 2024-03-28 RX ADMIN — Medication 1 MILLIGRAM(S): at 12:37

## 2024-03-28 RX ADMIN — LIDOCAINE 1 PATCH: 4 CREAM TOPICAL at 19:37

## 2024-03-28 NOTE — PROGRESS NOTE ADULT - SUBJECTIVE AND OBJECTIVE BOX
Patient seen and examined.  Patient was A&Ox3 and was cooperative throughout the exam.     MEDICATIONS  (STANDING):  aspirin enteric coated 81 milliGRAM(s) Oral daily  atorvastatin 80 milliGRAM(s) Oral at bedtime  clopidogrel Tablet 75 milliGRAM(s) Oral daily  dexAMETHasone  IVPB 1 milliGRAM(s) IV Intermittent every 6 hours  finasteride 5 milliGRAM(s) Oral daily  insulin lispro (ADMELOG) corrective regimen sliding scale   SubCutaneous Before meals and at bedtime  polyethylene glycol 3350 17 Gram(s) Oral daily  senna 2 Tablet(s) Oral at bedtime  tamsulosin 0.4 milliGRAM(s) Oral at bedtime  topiramate 50 milliGRAM(s) Oral every 24 hours    MEDICATIONS  (PRN):  acetaminophen     Tablet .. 650 milliGRAM(s) Oral every 6 hours PRN Temp greater or equal to 38C (100.4F), Mild Pain (1 - 3)  ondansetron Injectable 4 milliGRAM(s) IV Push every 8 hours PRN Nausea and/or Vomiting      Allergies    Allergy Status Unknown    Intolerances        Vital Signs Last 24 Hrs  T(C): 36.6 (26 Mar 2024 10:41), Max: 38.7 (25 Mar 2024 21:20)  T(F): 97.8 (26 Mar 2024 10:41), Max: 101.7 (25 Mar 2024 21:20)  HR: 62 (26 Mar 2024 10:25) (56 - 75)  BP: 113/70 (26 Mar 2024 10:25) (106/55 - 130/72)  BP(mean): 86 (26 Mar 2024 10:25) (75 - 94)  RR: 14 (26 Mar 2024 10:25) (14 - 19)  SpO2: 94% (26 Mar 2024 10:25) (94% - 97%)    Parameters below as of 26 Mar 2024 10:25  Patient On (Oxygen Delivery Method): room air        Physical exam:  General: No acute distress, awake and alert  Eyes: moist conjunctivae, see below for CNs  Neck: FROM  Cardiovascular: Regular rate and rhythm  Pulmonary: No use of accessory muscles  Extremities: no edema    Neurologic:  -Mental status: Awake, opens eyes and follows commands easily to voice, oriented to person, place, and time. Speech is fluent with intact naming, repetition, and comprehension, Mild dysarthria. Recent and remote memory intact. Follows commands.    -Cranial nerves:   III, IV, VI: Extraocular movements are intact without nystagmus. Pupils equally round and reactive to light  V:  Facial sensation V1-V3 equal and intact   VII: Mild left nasolabial fold flattening.   Motor: Normal bulk and tone. Left upper extremity drift with no pronation observed. Left upper and lower extremity 4/5. Right upper and lower extremity 5/5.   Sensation: Intact to light touch bilaterally. No neglect or extinction on double simultaneous testing.  Coordination: No dysmetria on finger-to-nose bilaterally    LABS:                        12.4   5.56  )-----------( 130      ( 26 Mar 2024 05:30 )             37.5     03-26    136  |  104  |  8   ----------------------------<  91  3.7   |  28  |  0.66    Ca    8.8      26 Mar 2024 05:30  Phos  3.1     03-26  Mg     1.8     03-26    TPro  5.6<L>  /  Alb  3.8  /  TBili  0.8  /  DBili  x   /  AST  17  /  ALT  10  /  AlkPhos  92  03-24    PT/INR - ( 24 Mar 2024 16:33 )   PT: 11.5 sec;   INR: 1.01          PTT - ( 24 Mar 2024 16:33 )  PTT:24.9 sec  Urinalysis Basic - ( 26 Mar 2024 05:30 )    Color: x / Appearance: x / SG: x / pH: x  Gluc: 91 mg/dL / Ketone: x  / Bili: x / Urobili: x   Blood: x / Protein: x / Nitrite: x   Leuk Esterase: x / RBC: x / WBC x   Sq Epi: x / Non Sq Epi: x / Bacteria: x        RADIOLOGY & ADDITIONAL TESTS:  Reviewed.

## 2024-03-28 NOTE — DIETITIAN INITIAL EVALUATION ADULT - PERTINENT MEDS FT
MEDICATIONS  (STANDING):  aspirin enteric coated 81 milliGRAM(s) Oral daily  atorvastatin 40 milliGRAM(s) Oral at bedtime  benzonatate 100 milliGRAM(s) Oral every 8 hours  clopidogrel Tablet 75 milliGRAM(s) Oral daily  dexAMETHasone     Tablet 1 milliGRAM(s) Oral every 6 hours  enoxaparin Injectable 40 milliGRAM(s) SubCutaneous every 24 hours  finasteride 5 milliGRAM(s) Oral daily  lidocaine   4% Patch 1 Patch Transdermal every 24 hours  lidocaine   4% Patch 1 Patch Transdermal daily  polyethylene glycol 3350 17 Gram(s) Oral daily  remdesivir  IVPB 100 milliGRAM(s) IV Intermittent once  senna 2 Tablet(s) Oral at bedtime  sodium chloride 0.9%. 1000 milliLiter(s) (100 mL/Hr) IV Continuous <Continuous>  tamsulosin 0.4 milliGRAM(s) Oral at bedtime  topiramate 50 milliGRAM(s) Oral every 24 hours    MEDICATIONS  (PRN):  acetaminophen     Tablet .. 650 milliGRAM(s) Oral every 6 hours PRN Temp greater or equal to 38C (100.4F), Mild Pain (1 - 3)  benzocaine/menthol Lozenge 1 Lozenge Oral three times a day PRN Sore Throat  ondansetron Injectable 4 milliGRAM(s) IV Push every 8 hours PRN Nausea and/or Vomiting

## 2024-03-28 NOTE — DIETITIAN INITIAL EVALUATION ADULT - ADD RECOMMEND
1. c/w current diet order  - monitor intake & tolerance  - small, frequent meals encouraged  2. Additional snacks throughout day   3. Ongoing diet educaiton prn  4. Monitor chemistry, GI function, skin integrity  5. Pain & bowel regimen per team

## 2024-03-28 NOTE — DIETITIAN INITIAL EVALUATION ADULT - PERTINENT LABORATORY DATA
03-28    136  |  105  |  14  ----------------------------<  180<H>  3.5   |  25  |  0.78    Ca    9.1      28 Mar 2024 10:08  Phos  2.5     03-28  Mg     2.1     03-28    TPro  5.7<L>  /  Alb  3.5  /  TBili  0.6  /  DBili  x   /  AST  23  /  ALT  10  /  AlkPhos  76  03-26  POCT Blood Glucose.: 115 mg/dL (03-27-24 @ 16:28)  A1C with Estimated Average Glucose Result: 5.5 % (03-25-24 @ 05:09)

## 2024-03-28 NOTE — DIETITIAN INITIAL EVALUATION ADULT - OTHER CALCULATIONS
Current body wt [66.9kg] used for energy calculations as pt falls within % IBW. Needs estimated for age and adjusted for current clinical status, increased needs for malnutrition/repletion. Fluid needs per team*

## 2024-03-28 NOTE — PROGRESS NOTE ADULT - ASSESSMENT
72 y/o M w/PMHX of HTN, BPH, HLD, mitral valve repair present to ED with left sided weakness, left facial droop, and left gaze preference, s/p TNK (3/24/24). Initial NIHSS 17 after TNK was administered, the score was reduced to 15. Stroke code imaging demonstrates right M1 occlusion, now s/p thrombectomy Right M1/2, TICI 0 to 2B (3/24/24). 24-hour CTH post-TNK/thrombectomy demonstrated acute Right MCA infarct, consistent with initial CTH and no acute hemorrhage seen. NIHSS on 3/25/24 improved to 3.     Neuro  #Right MCA infarct status post TNK and thrombectomy with Initial NIHSS 17 improved to 3.   - continue on aspirin 81mg and plavix 75mg daily  - continue atorvastatin 80mg daily  - q4hr stroke neuro checks and vitals  - Stroke Code HCT Results: No gross acute intracranial hemorrhage or mass effect. Hyperdense right M1 MCA, concerning for thrombosis.  CTA COW: Abrupt right M1 occlusion with M2 reconstitution.  CTA NECK: Patent, ECAs, ICAs, no  hemodynamically significant stenosis at ICA origins by NASCET criteria. Bilateral vertebral arteries are patent without flow limiting stenosis.  - no need for MRI as stroke is seen on CTH  - Stroke education  - EP c/s for ILR placement - pending. Since pt is COVID(+), ILR placement is on hold for now.     #Headache  - Decadron 1 mg every 6 hours, FS bid while on steroids - may be stopped if O2 remains stable overnight. Administered due to constant headache but patient is no longer experiencing it.   - Topiramate increased from 25mg to 50 mg once daily on 3/26   - d/c tramadol 2/2 sedation  - can also receive PRN IV tylenol for pain    Cards  #HTN  - permissive hypertension, Goal -180  - hold home blood pressure medication for now  - Echo w/bubble: Aortic sclerosis without significant stenosis.  An annuloplasty ring is noted in the mitral position. Mean   transvalvular gradient is 2.30 mmHg at a heart rate of 67 bpm.  < from: DEBBIE w/Doppler (03.26.24 @ 14:01) >   1. Normal left and right ventricular size and systolic function.   2. No LA/RA/DANIEL/RAA thrombus seen.   3. No evidence of an intracardiac shunt.   4. Mild aortic regurgitation.   5. An annuloplasty ring is noted in the mitral position. The mean   transvalvular gradient is 2.00 mmHg at a heart rate of 68 bpm. There is   trace mitral regurgitation.   6. No echo evidence of pulmonary HTN.   7. No pericardial effusion.    #HLD  - high dose statin as above in CVA  - LDL results: 59 mg/dL    Pulm  - call provider if SPO2 < 94%    GI  #Nutrition/Fluids/Electrolytes   - replete K<4 and Mg <2  - Diet: Soft and Bite-sized   - IVF: s/p NS 500cc bolus x 1 as SBP dropped to low 100s after standing the patient up, started maintenance fluids    Renal  - monitor and trend Cr    Infectious Disease  - CXR with Bilateral lower lobe linear opacities that likely represent atelectasis or less likely viral infection.    #COVID   - Remdesevir x 3 days     Endocrine  - A1C results: 5.5    - TSH results: 0.603      Urology  #BPH  - c/w finasteride and flomax    DVT Prophylaxis  - lovenox sq for DVT prophylaxis   - SCDs for DVT prophylaxis       IDR Goals: Goals reviewed at interdisciplinary rounds with case management, social work, physical therapy, occupational therapy, and speech language pathology.   Please see specific therapy  notes for in depth goals.  Dispo: AR - can tolerate 3 hours of therapy     Discussed daily hospital plans and goals with patient and family at bedside.  Discussed with Neurology Attending Dr. Cast and Neurology Fellow Dr. Sherwood.

## 2024-03-28 NOTE — PROGRESS NOTE ADULT - ASSESSMENT
72 y/o M w/       Problem/Recommendation -   ·  Problem: CVA (cerebrovascular accident).   ·  Recommendation: Pt. c/o L-sided weakness and HA, c/f stroke; Pt. received TNK; CTH reviewed, shows acute R MCA infarct  -- cont. work-up and mgmt per Neuro  -- PT/OT  -- on DAPT + high-intensity statin  -- s/p DEBBIE, possible ILR placement. Continue with telemetry monitoring        Problem/Recommendation -   ·  Problem: COVID infection .   ·  Recommendation: Started on Remdesivir,. Continue to monitor 02 requirement, overnight event noted, however resolved spontaneously  if hypoxia will adjust Dexamethasone to 6 mg daily. Monitor LFTs and creatinine while on Remdesivir. Patient was started on Dexamethasone for H/A on 3/26.   AC per primary team      Problem/Recommendation -   ·  Problem: Essential hypertension.   ·  Recommendation: BP goal per Neuro.     Problem/Recommendation -   ·  Problem: BPH (benign prostatic hyperplasia).   ·  Recommendation: chronic  cont. Flomax + Proscar.     Problem/Recommendation -6 RESOLVED   ·  Problem: Thrombocytopenia.     DVT ppx: SQH  Discussed with primary team

## 2024-03-28 NOTE — DIETITIAN INITIAL EVALUATION ADULT - OTHER INFO
73y/M with HTN, BPH, HLD, coming in with left sided weakness. Pt states that he woke up at 5 am and went for a run. He came back and went to shower. He said he slipped and hit his head on the shower door. Wife states that he heard a thud from the shower around 7:45AM. She went in and noticed his left side was weak and his head was against the shower door. He had not fallen to the ground. She called her daughter who came by, and they also called EMS. Pt states that he slipped and fell, does not think he has any actual weakness. He wonders how his dog is doing. 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 NIHSS 17. Pt states he has a headache, 9/10 but says it isn't that bad and does not need medication for the headache. Case discussed with Dr. Narinder Sherwood who discussed with Dr. Schreiber prior to urgent intervention. Risk and benefits of tenecteplase were discussed with patient/family member including risk of symptomatic ICH/death. Decision was made that benefits outweighed risks and tenecteplase was administered. Thrombectomy was discussed between Neurology and Neuro IR Attendings and decision was made to proceed with thrombectomy. Tenecteplase given at 8:46AM.  Pt was seen about 30 minutes after TNK was given, NIHSS improved to 15. Headache similar to how it was prior to tenecteplase administration    Chart reviewed. Labs & Meds reviewed. Pt seen with visitor at bedside on 5 LA, on room air. Currently ordered for PO DASH/TLC diet and tolerating. Pt confirms no known food allergies, no cultural/ethnic/Cheondoism food preferences obtained. Pt endorses fair appetite at baseline- diet recall obtained. Usual intake consists of tomato soup for lunch and meat & cheese sandwich for dinner. Pt reported unintentional ~20lbs weight loss over last few months due to "walking challenge" at work [~11.9% loss]. NFPE notable for moderate muscle wasting & subcutaneous losses. current weight is 88.9% IBW. Pt meets criteria for moderate malnutrition per ASPEN guidelines. Diet education provided on obtaining adequate kcals/protein, all questions answered. Pt amenable to additional snacks throughout the day to assist in meeting needs. RDN will continue to monitor, reassess, and intervene as appropriate.     Pain: no pain/discomfort noted  Skin: no pressure injuries noted, Dejuan score 19  GI: pt denies n/v/c/d/abd pain

## 2024-03-28 NOTE — PROGRESS NOTE ADULT - SUBJECTIVE AND OBJECTIVE BOX
Patient is a 73y old  Male who presents with a chief complaint of Acute Stroke (27 Mar 2024 15:28)    INTERVAL EVENTS:    SUBJECTIVE:  Patient was seen and examined at bedside. Reports poor night, noted with episode of hypoxia to 89 resolved spontaneously, patient not aware of events, denies SOB, no chest pain. Per chart, patient appeared confused overnight, he feels restless with steroids. No other complaints or events reported. Telemetry reviewed, Atrial ectopy. No other complaints or events reported.    Review of systems: No fever, chills, dizziness, HA, Changes in vision, CP, dyspnea, nausea or vomiting, dysuria, changes in bowel movements, LE edema. Rest of 12 point Review of systems negative unless otherwise documented elsewhere in note.     Diet, DASH/TLC:   Sodium & Cholesterol Restricted (03-27-24 @ 15:26) [Active]      MEDICATIONS:  MEDICATIONS  (STANDING):  aspirin enteric coated 81 milliGRAM(s) Oral daily  atorvastatin 40 milliGRAM(s) Oral at bedtime  benzonatate 100 milliGRAM(s) Oral every 8 hours  clopidogrel Tablet 75 milliGRAM(s) Oral daily  dexAMETHasone     Tablet 1 milliGRAM(s) Oral every 6 hours  enoxaparin Injectable 40 milliGRAM(s) SubCutaneous every 24 hours  finasteride 5 milliGRAM(s) Oral daily  lidocaine   4% Patch 1 Patch Transdermal daily  lidocaine   4% Patch 1 Patch Transdermal every 24 hours  polyethylene glycol 3350 17 Gram(s) Oral daily  remdesivir  IVPB 100 milliGRAM(s) IV Intermittent once  senna 2 Tablet(s) Oral at bedtime  sodium chloride 0.9%. 1000 milliLiter(s) (100 mL/Hr) IV Continuous <Continuous>  tamsulosin 0.4 milliGRAM(s) Oral at bedtime  topiramate 50 milliGRAM(s) Oral every 24 hours    MEDICATIONS  (PRN):  acetaminophen     Tablet .. 650 milliGRAM(s) Oral every 6 hours PRN Temp greater or equal to 38C (100.4F), Mild Pain (1 - 3)  benzocaine/menthol Lozenge 1 Lozenge Oral three times a day PRN Sore Throat  ondansetron Injectable 4 milliGRAM(s) IV Push every 8 hours PRN Nausea and/or Vomiting      Allergies    Allergy Status Unknown    Intolerances        OBJECTIVE:  Vital Signs Last 24 Hrs  T(C): 36.3 (28 Mar 2024 14:33), Max: 36.8 (27 Mar 2024 22:36)  T(F): 97.4 (28 Mar 2024 14:33), Max: 98.2 (27 Mar 2024 22:36)  HR: 68 (28 Mar 2024 12:10) (63 - 123)  BP: 128/79 (28 Mar 2024 12:10) (115/67 - 145/85)  BP(mean): 97 (28 Mar 2024 12:10) (83 - 109)  RR: 18 (28 Mar 2024 12:10) (15 - 26)  SpO2: 98% (28 Mar 2024 12:10) (89% - 98%)    Parameters below as of 28 Mar 2024 12:10  Patient On (Oxygen Delivery Method): room air      I&O's Summary    27 Mar 2024 07:01  -  28 Mar 2024 07:00  --------------------------------------------------------  IN: 2720 mL / OUT: 1420 mL / NET: 1300 mL    28 Mar 2024 07:01  -  28 Mar 2024 15:05  --------------------------------------------------------  IN: 0 mL / OUT: 400 mL / NET: -400 mL        PHYSICAL EXAM:  General: AOx3, NAD, sitting in bed, no labored breathing, on RA  HEENT: AT/NC  Lungs: good air entry, no crackles, no wheezes  Heart: RRR  Abdomen: soft, no tenderess  Extremities: left forearm with hematoma.  warm, no edema, no tenderness     LABS:                        13.2   8.59  )-----------( 156      ( 28 Mar 2024 10:08 )             39.1     03-28    136  |  105  |  14  ----------------------------<  180<H>  3.5   |  25  |  0.78    Ca    9.1      28 Mar 2024 10:08  Phos  2.5     03-28  Mg     2.1     03-28    TPro  5.7<L>  /  Alb  3.5  /  TBili  0.6  /  DBili  x   /  AST  23  /  ALT  10  /  AlkPhos  76  03-26    LIVER FUNCTIONS - ( 26 Mar 2024 23:18 )  Alb: 3.5 g/dL / Pro: 5.7 g/dL / ALK PHOS: 76 U/L / ALT: 10 U/L / AST: 23 U/L / GGT: x           PT/INR - ( 27 Mar 2024 06:25 )   PT: 10.6 sec;   INR: 0.93          PTT - ( 27 Mar 2024 06:25 )  PTT:27.2 sec  CAPILLARY BLOOD GLUCOSE      POCT Blood Glucose.: 115 mg/dL (27 Mar 2024 16:28)    Urinalysis Basic - ( 28 Mar 2024 10:08 )    Color: x / Appearance: x / SG: x / pH: x  Gluc: 180 mg/dL / Ketone: x  / Bili: x / Urobili: x   Blood: x / Protein: x / Nitrite: x   Leuk Esterase: x / RBC: x / WBC x   Sq Epi: x / Non Sq Epi: x / Bacteria: x        MICRODATA:    Urinalysis with Rflx Culture (collected 25 Mar 2024 21:58)        RADIOLOGY/OTHER STUDIES:

## 2024-03-29 LAB
ANION GAP SERPL CALC-SCNC: 8 MMOL/L — SIGNIFICANT CHANGE UP (ref 5–17)
BUN SERPL-MCNC: 13 MG/DL — SIGNIFICANT CHANGE UP (ref 7–23)
CALCIUM SERPL-MCNC: 9.5 MG/DL — SIGNIFICANT CHANGE UP (ref 8.4–10.5)
CHLORIDE SERPL-SCNC: 107 MMOL/L — SIGNIFICANT CHANGE UP (ref 96–108)
CO2 SERPL-SCNC: 24 MMOL/L — SIGNIFICANT CHANGE UP (ref 22–31)
CREAT SERPL-MCNC: 0.7 MG/DL — SIGNIFICANT CHANGE UP (ref 0.5–1.3)
EGFR: 98 ML/MIN/1.73M2 — SIGNIFICANT CHANGE UP
GLUCOSE SERPL-MCNC: 113 MG/DL — HIGH (ref 70–99)
HCT VFR BLD CALC: 40 % — SIGNIFICANT CHANGE UP (ref 39–50)
HGB BLD-MCNC: 13.8 G/DL — SIGNIFICANT CHANGE UP (ref 13–17)
MAGNESIUM SERPL-MCNC: 2 MG/DL — SIGNIFICANT CHANGE UP (ref 1.6–2.6)
MCHC RBC-ENTMCNC: 33.2 PG — SIGNIFICANT CHANGE UP (ref 27–34)
MCHC RBC-ENTMCNC: 34.5 GM/DL — SIGNIFICANT CHANGE UP (ref 32–36)
MCV RBC AUTO: 96.2 FL — SIGNIFICANT CHANGE UP (ref 80–100)
NRBC # BLD: 0 /100 WBCS — SIGNIFICANT CHANGE UP (ref 0–0)
PHOSPHATE SERPL-MCNC: 2.9 MG/DL — SIGNIFICANT CHANGE UP (ref 2.5–4.5)
PLATELET # BLD AUTO: 181 K/UL — SIGNIFICANT CHANGE UP (ref 150–400)
POTASSIUM SERPL-MCNC: 3.7 MMOL/L — SIGNIFICANT CHANGE UP (ref 3.5–5.3)
POTASSIUM SERPL-SCNC: 3.7 MMOL/L — SIGNIFICANT CHANGE UP (ref 3.5–5.3)
RBC # BLD: 4.16 M/UL — LOW (ref 4.2–5.8)
RBC # FLD: 12.5 % — SIGNIFICANT CHANGE UP (ref 10.3–14.5)
SARS-COV-2 RNA SPEC QL NAA+PROBE: DETECTED
SODIUM SERPL-SCNC: 139 MMOL/L — SIGNIFICANT CHANGE UP (ref 135–145)
WBC # BLD: 8.4 K/UL — SIGNIFICANT CHANGE UP (ref 3.8–10.5)
WBC # FLD AUTO: 8.4 K/UL — SIGNIFICANT CHANGE UP (ref 3.8–10.5)

## 2024-03-29 PROCEDURE — 99232 SBSQ HOSP IP/OBS MODERATE 35: CPT

## 2024-03-29 RX ORDER — POTASSIUM CHLORIDE 20 MEQ
40 PACKET (EA) ORAL ONCE
Refills: 0 | Status: COMPLETED | OUTPATIENT
Start: 2024-03-29 | End: 2024-03-29

## 2024-03-29 RX ADMIN — Medication 650 MILLIGRAM(S): at 16:47

## 2024-03-29 RX ADMIN — Medication 50 MILLIGRAM(S): at 21:14

## 2024-03-29 RX ADMIN — ATORVASTATIN CALCIUM 40 MILLIGRAM(S): 80 TABLET, FILM COATED ORAL at 21:14

## 2024-03-29 RX ADMIN — Medication 100 MILLIGRAM(S): at 21:14

## 2024-03-29 RX ADMIN — TAMSULOSIN HYDROCHLORIDE 0.4 MILLIGRAM(S): 0.4 CAPSULE ORAL at 21:14

## 2024-03-29 RX ADMIN — Medication 100 MILLIGRAM(S): at 05:59

## 2024-03-29 RX ADMIN — Medication 100 MILLIGRAM(S): at 12:06

## 2024-03-29 RX ADMIN — Medication 3 MILLIGRAM(S): at 21:14

## 2024-03-29 RX ADMIN — POLYETHYLENE GLYCOL 3350 17 GRAM(S): 17 POWDER, FOR SOLUTION ORAL at 12:03

## 2024-03-29 RX ADMIN — CLOPIDOGREL BISULFATE 75 MILLIGRAM(S): 75 TABLET, FILM COATED ORAL at 12:03

## 2024-03-29 RX ADMIN — Medication 81 MILLIGRAM(S): at 12:03

## 2024-03-29 RX ADMIN — Medication 40 MILLIEQUIVALENT(S): at 10:16

## 2024-03-29 RX ADMIN — SENNA PLUS 2 TABLET(S): 8.6 TABLET ORAL at 21:13

## 2024-03-29 RX ADMIN — Medication 650 MILLIGRAM(S): at 22:08

## 2024-03-29 RX ADMIN — FINASTERIDE 5 MILLIGRAM(S): 5 TABLET, FILM COATED ORAL at 12:03

## 2024-03-29 NOTE — PROGRESS NOTE ADULT - SUBJECTIVE AND OBJECTIVE BOX
Neurology Stroke Progress Note    INTERVAL HPI/OVERNIGHT EVENTS:  Patient seen and examined.      MEDICATIONS  (STANDING):  aspirin enteric coated 81 milliGRAM(s) Oral daily  atorvastatin 40 milliGRAM(s) Oral at bedtime  benzonatate 100 milliGRAM(s) Oral every 8 hours  clopidogrel Tablet 75 milliGRAM(s) Oral daily  enoxaparin Injectable 40 milliGRAM(s) SubCutaneous every 24 hours  finasteride 5 milliGRAM(s) Oral daily  lidocaine   4% Patch 1 Patch Transdermal daily  lidocaine   4% Patch 1 Patch Transdermal every 24 hours  melatonin 3 milliGRAM(s) Oral at bedtime  polyethylene glycol 3350 17 Gram(s) Oral daily  senna 2 Tablet(s) Oral at bedtime  sodium chloride 0.9%. 1000 milliLiter(s) (100 mL/Hr) IV Continuous <Continuous>  tamsulosin 0.4 milliGRAM(s) Oral at bedtime  topiramate 50 milliGRAM(s) Oral every 24 hours    MEDICATIONS  (PRN):  acetaminophen     Tablet .. 650 milliGRAM(s) Oral every 6 hours PRN Temp greater or equal to 38C (100.4F), Mild Pain (1 - 3)  benzocaine/menthol Lozenge 1 Lozenge Oral three times a day PRN Sore Throat  ondansetron Injectable 4 milliGRAM(s) IV Push every 8 hours PRN Nausea and/or Vomiting      Allergies    Allergy Status Unknown    Intolerances        Vital Signs Last 24 Hrs  T(C): 35.9 (29 Mar 2024 05:25), Max: 36.6 (28 Mar 2024 09:53)  T(F): 96.7 (29 Mar 2024 05:25), Max: 97.8 (28 Mar 2024 09:53)  HR: 63 (29 Mar 2024 08:24) (53 - 74)  BP: 131/85 (29 Mar 2024 08:24) (114/78 - 144/87)  BP(mean): 103 (29 Mar 2024 08:24) (92 - 111)  RR: 20 (29 Mar 2024 08:24) (15 - 20)  SpO2: 94% (29 Mar 2024 08:24) (93% - 98%)    Parameters below as of 29 Mar 2024 08:24  Patient On (Oxygen Delivery Method): room air        Neurologic:  -Mental status: Awake, opens eyes and follows commands easily to voice, oriented to person, place, and time. Speech is fluent with intact naming, repetition, and comprehension, Mild dysarthria. Recent and remote memory intact. Follows commands.    -Cranial nerves:   III, IV, VI: Extraocular movements are intact without nystagmus. Pupils equally round and reactive to light  V:  Facial sensation V1-V3 equal and intact   VII: Mild left nasolabial fold flattening.   Motor: Normal bulk and tone. Left upper extremity drift with no pronation observed. Left upper and lower extremity 4/5. Right upper and lower extremity 5/5.   Sensation: Intact to light touch bilaterally. No neglect or extinction on double simultaneous testing.  Coordination: No dysmetria on finger-to-nose bilaterally    LABS:                        13.8   8.40  )-----------( 181      ( 29 Mar 2024 05:30 )             40.0     03-29    139  |  107  |  13  ----------------------------<  113<H>  3.7   |  24  |  0.70    Ca    9.5      29 Mar 2024 05:30  Phos  2.9     03-29  Mg     2.0     03-29        Urinalysis Basic - ( 29 Mar 2024 05:30 )    Color: x / Appearance: x / SG: x / pH: x  Gluc: 113 mg/dL / Ketone: x  / Bili: x / Urobili: x   Blood: x / Protein: x / Nitrite: x   Leuk Esterase: x / RBC: x / WBC x   Sq Epi: x / Non Sq Epi: x / Bacteria: x        RADIOLOGY & ADDITIONAL TESTS:  reviewed

## 2024-03-29 NOTE — PROGRESS NOTE ADULT - ASSESSMENT
72 y/o M w/PMHX of HTN, BPH, HLD, mitral valve repair present to ED with left sided weakness, left facial droop, and left gaze preference, s/p TNK (3/24/24). Initial NIHSS 17 after TNK was administered, the score was reduced to 15. Stroke code imaging demonstrates right M1 occlusion, s/p thrombectomy Right M1/2, TICI 0 to 2B (3/24/24). 24-hour CTH post-TNK/thrombectomy demonstrated acute Right MCA infarct, consistent with initial CTH and no acute hemorrhage seen. NIHSS on 3/25/24 improved to 3.        Problem/Recommendation -   ·  Problem: CVA (cerebrovascular accident).   ·  Recommendation: Pt. c/o L-sided weakness and HA, c/f stroke; Pt. received TNK; CTH reviewed, shows acute R MCA infarct  -- cont. work-up and mgmt per Neuro  -- PT/OT  -- on DAPT + high-intensity statin  -- s/p DEBBIE, possible ILR placement pending COVID + status, will need to time with EP. Continue with telemetry monitoring        Problem/Recommendation -   ·  Problem: COVID infection .   ·  Recommendation: Started on Remdesivir,. Continue to monitor 02 requirement,  if hypoxia will start Dexamethasone to 6 mg daily. Monitor LFTs and creatinine while on Remdesivir. Patient was started on Dexamethasone for H/A on 3/26 discontinued since Headache resolved,   AC per primary team      Problem/Recommendation -   ·  Problem: Essential hypertension.   ·  Recommendation: BP goal per Neuro.     Problem/Recommendation -   ·  Problem: BPH (benign prostatic hyperplasia).   ·  Recommendation: chronic  cont. Flomax + Proscar.     Problem/Recommendation -6 RESOLVED   ·  Problem: Thrombocytopenia.     DVT ppx: SQH  Discussed with primary team

## 2024-03-29 NOTE — PROGRESS NOTE ADULT - SUBJECTIVE AND OBJECTIVE BOX
SUBJECTIVE:  Patient was seen and examined at bedside. Initially sleeping,     Review of systems: No fever, chills, dizziness, HA, Changes in vision, CP, dyspnea, nausea or vomiting, dysuria, changes in bowel movements, LE edema. Rest of 12 point Review of systems negative unless otherwise documented elsewhere in note.     Diet, DASH/TLC:   Sodium & Cholesterol Restricted (03-27-24 @ 15:26) [Active]      MEDICATIONS:  MEDICATIONS  (STANDING):  aspirin enteric coated 81 milliGRAM(s) Oral daily  atorvastatin 40 milliGRAM(s) Oral at bedtime  benzonatate 100 milliGRAM(s) Oral every 8 hours  clopidogrel Tablet 75 milliGRAM(s) Oral daily  enoxaparin Injectable 40 milliGRAM(s) SubCutaneous every 24 hours  finasteride 5 milliGRAM(s) Oral daily  lidocaine   4% Patch 1 Patch Transdermal daily  lidocaine   4% Patch 1 Patch Transdermal every 24 hours  melatonin 3 milliGRAM(s) Oral at bedtime  polyethylene glycol 3350 17 Gram(s) Oral daily  senna 2 Tablet(s) Oral at bedtime  sodium chloride 0.9%. 1000 milliLiter(s) (100 mL/Hr) IV Continuous <Continuous>  tamsulosin 0.4 milliGRAM(s) Oral at bedtime  topiramate 50 milliGRAM(s) Oral every 24 hours    MEDICATIONS  (PRN):  acetaminophen     Tablet .. 650 milliGRAM(s) Oral every 6 hours PRN Temp greater or equal to 38C (100.4F), Mild Pain (1 - 3)  benzocaine/menthol Lozenge 1 Lozenge Oral three times a day PRN Sore Throat  ondansetron Injectable 4 milliGRAM(s) IV Push every 8 hours PRN Nausea and/or Vomiting      Allergies    Allergy Status Unknown    Intolerances        OBJECTIVE:  Vital Signs Last 24 Hrs  T(C): 35.9 (29 Mar 2024 05:25), Max: 36.6 (28 Mar 2024 09:53)  T(F): 96.7 (29 Mar 2024 05:25), Max: 97.8 (28 Mar 2024 09:53)  HR: 69 (29 Mar 2024 05:50) (53 - 74)  BP: 138/81 (29 Mar 2024 05:50) (114/78 - 144/87)  BP(mean): 104 (29 Mar 2024 05:50) (92 - 111)  RR: 16 (29 Mar 2024 05:50) (15 - 20)  SpO2: 93% (29 Mar 2024 05:50) (93% - 98%)    Parameters below as of 29 Mar 2024 05:50  Patient On (Oxygen Delivery Method): room air      I&O's Summary    28 Mar 2024 07:01  -  29 Mar 2024 07:00  --------------------------------------------------------  IN: 1520 mL / OUT: 3175 mL / NET: -1655 mL        PHYSICAL EXAM:  General: Initially sleeping, awakes to voice, NAD, lying in bed, no labored breathing, on RA  HEENT: AT/NC  Lungs: limited, no crackles, no wheezes  Heart: RRR  Abdomen: soft, no tenderess  Extremities: left forearm with resolving hematoma.  warm, no edema, no tenderness     LABS:                        13.8   8.40  )-----------( 181      ( 29 Mar 2024 05:30 )             40.0     03-28    136  |  105  |  14  ----------------------------<  180<H>  3.5   |  25  |  0.78    Ca    9.1      28 Mar 2024 10:08  Phos  2.5     03-28  Mg     2.1     03-28          CAPILLARY BLOOD GLUCOSE        Urinalysis Basic - ( 28 Mar 2024 10:08 )    Color: x / Appearance: x / SG: x / pH: x  Gluc: 180 mg/dL / Ketone: x  / Bili: x / Urobili: x   Blood: x / Protein: x / Nitrite: x   Leuk Esterase: x / RBC: x / WBC x   Sq Epi: x / Non Sq Epi: x / Bacteria: x        MICRODATA:      RADIOLOGY/OTHER STUDIES:

## 2024-03-29 NOTE — PROGRESS NOTE ADULT - ASSESSMENT
74 y/o M w/PMHX of HTN, BPH, HLD, mitral valve repair present to ED with left sided weakness, left facial droop, and left gaze preference, s/p TNK (3/24/24). Initial NIHSS 17 after TNK was administered, the score was reduced to 15. Stroke code imaging demonstrates right M1 occlusion, now s/p thrombectomy Right M1/2, TICI 0 to 2B (3/24/24). 24-hour CTH post-TNK/thrombectomy demonstrated acute Right MCA infarct, consistent with initial CTH and no acute hemorrhage seen. NIHSS on 3/25/24 improved to 3. TTE/DEBBIE unremarkable. COVID pos 3/26. Pending AR and ILR placement.    Neuro  #Right MCA infarct status post TNK and thrombectomy with Initial NIHSS 17 improved to 3.   - continue on aspirin 81mg and plavix 75mg daily  - continue atorvastatin 40mg daily  - q4hr stroke neuro checks and vitals  - Stroke Code HCT Results: No gross acute intracranial hemorrhage or mass effect. Hyperdense right M1 MCA, concerning for thrombosis.  CTA COW: Abrupt right M1 occlusion with M2 reconstitution.  CTA NECK: Patent, ECAs, ICAs, no  hemodynamically significant stenosis at ICA origins by NASCET criteria. Bilateral vertebral arteries are patent without flow limiting stenosis.  - no need for MRI as stroke is seen on CTH  - Stroke education  - EP c/s for ILR placement - pending. Since pt is COVID(+), ILR placement is on hold for now.   - f/u reswab    #Headache  - Decadron 1 mg every 6 hours - d/c as headache is resolved  - Topiramate increased from 25mg to 50 mg once daily on 3/26   - d/c tramadol 2/2 sedation  - can also receive PRN IV tylenol for pain    Cards  #HTN  - permissive hypertension, Goal -180  - hold home blood pressure medication for now  - Echo w/bubble: Aortic sclerosis without significant stenosis.  An annuloplasty ring is noted in the mitral position. Mean   transvalvular gradient is 2.30 mmHg at a heart rate of 67 bpm.  < from: DEBBIE w/Doppler (03.26.24 @ 14:01) >   1. Normal left and right ventricular size and systolic function.   2. No LA/RA/DANIEL/RAA thrombus seen.   3. No evidence of an intracardiac shunt.   4. Mild aortic regurgitation.   5. An annuloplasty ring is noted in the mitral position. The mean   transvalvular gradient is 2.00 mmHg at a heart rate of 68 bpm. There is   trace mitral regurgitation.   6. No echo evidence of pulmonary HTN.   7. No pericardial effusion.    #HLD  - high dose statin as above in CVA  - LDL results: 59 mg/dL    Pulm  - call provider if SPO2 < 94%    GI  #Nutrition/Fluids/Electrolytes   - replete K<4 and Mg <2  - Diet: Soft and Bite-sized   - IVF: s/p NS 500cc bolus x 1 as SBP dropped to low 100s after standing the patient up, started maintenance fluids    Renal  - monitor and trend Cr    Infectious Disease  - CXR with Bilateral lower lobe linear opacities that likely represent atelectasis or less likely viral infection.    #COVID   - Remdesevir x 3 days   - COVID positive 3/26 - pending 10 day isolation for rehab  - f/u reswab    Endocrine  - A1C results: 5.5    - TSH results: 0.603      Urology  #BPH  - c/w finasteride and flomax    DVT Prophylaxis  - lovenox sq for DVT prophylaxis   - SCDs for DVT prophylaxis       IDR Goals: Goals reviewed at interdisciplinary rounds with case management, social work, physical therapy, occupational therapy, and speech language pathology.   Please see specific therapy  notes for in depth goals.  Dispo: AR - can tolerate 3 hours of therapy     Discussed daily hospital plans and goals with patient and daughter at bedside.  Discussed with Dr. Sherwood and Dr. Muir   74 y/o M w/PMHX of HTN, BPH, HLD, mitral valve repair present to ED with left sided weakness, left facial droop, and left gaze preference, s/p TNK (3/24/24). Initial NIHSS 17 after TNK was administered, the score was reduced to 15. Stroke code imaging demonstrates right M1 occlusion, now s/p thrombectomy Right M1/2, TICI 0 to 2B (3/24/24). 24-hour CTH post-TNK/thrombectomy demonstrated acute Right MCA infarct, consistent with initial CTH and no acute hemorrhage seen. NIHSS on 3/25/24 improved to 3. TTE/DEBBIE unremarkable. COVID pos 3/26. Pending AR and ILR placement.    Neuro  #Right MCA infarct status post TNK and thrombectomy with Initial NIHSS 17 improved to 3.   - continue on aspirin 81mg and plavix 75mg daily  - continue atorvastatin 40mg daily  - q4hr stroke neuro checks and vitals  - Stroke Code HCT Results: No gross acute intracranial hemorrhage or mass effect. Hyperdense right M1 MCA, concerning for thrombosis.  CTA COW: Abrupt right M1 occlusion with M2 reconstitution.  CTA NECK: Patent, ECAs, ICAs, no  hemodynamically significant stenosis at ICA origins by NASCET criteria. Bilateral vertebral arteries are patent without flow limiting stenosis.  - no need for MRI as stroke is seen on CTH  - Stroke education  - EP c/s for ILR placement - pending. Since pt is COVID(+), ILR placement is on hold for now.     #Headache  - Decadron 1 mg every 6 hours - d/c as headache is resolved  - Topiramate increased from 25mg to 50 mg once daily on 3/26   - d/c tramadol 2/2 sedation  - can also receive PRN IV tylenol for pain    Cards  #HTN  - permissive hypertension, Goal -180  - hold home blood pressure medication for now  - Echo w/bubble: Aortic sclerosis without significant stenosis.  An annuloplasty ring is noted in the mitral position. Mean   transvalvular gradient is 2.30 mmHg at a heart rate of 67 bpm.  < from: DEBBIE w/Doppler (03.26.24 @ 14:01) >   1. Normal left and right ventricular size and systolic function.   2. No LA/RA/DANIEL/RAA thrombus seen.   3. No evidence of an intracardiac shunt.   4. Mild aortic regurgitation.   5. An annuloplasty ring is noted in the mitral position. The mean   transvalvular gradient is 2.00 mmHg at a heart rate of 68 bpm. There is   trace mitral regurgitation.   6. No echo evidence of pulmonary HTN.   7. No pericardial effusion.    #HLD  - high dose statin as above in CVA  - LDL results: 59 mg/dL    Pulm  - call provider if SPO2 < 94%    GI  #Nutrition/Fluids/Electrolytes   - replete K<4 and Mg <2  - Diet: Soft and Bite-sized   - IVF: s/p NS 500cc bolus x 1 as SBP dropped to low 100s after standing the patient up, started maintenance fluids    Renal  - monitor and trend Cr    Infectious Disease  - CXR with Bilateral lower lobe linear opacities that likely represent atelectasis or less likely viral infection.    #COVID   - Remdesevir x 3 days   - COVID positive 3/26 - pending 10 day isolation for rehab  - COVID reswab 3/29 - positive    Endocrine  - A1C results: 5.5    - TSH results: 0.603      Urology  #BPH  - c/w finasteride and flomax    DVT Prophylaxis  - lovenox sq for DVT prophylaxis   - SCDs for DVT prophylaxis       IDR Goals: Goals reviewed at interdisciplinary rounds with case management, social work, physical therapy, occupational therapy, and speech language pathology.   Please see specific therapy  notes for in depth goals.  Dispo: AR - can tolerate 3 hours of therapy, pending re-eval as weakness is improving     Discussed daily hospital plans and goals with patient and daughter at bedside.  Discussed with Dr. Sherwood and Dr. Muir   74 y/o M w/PMHX of HTN, BPH, HLD, mitral valve repair present to ED with left sided weakness, left facial droop, and left gaze preference, s/p TNK (3/24/24). Initial NIHSS 17 after TNK was administered, the score was reduced to 15. Stroke code imaging demonstrates right M1 occlusion, now s/p thrombectomy Right M1/2, TICI 0 to 2B (3/24/24). 24-hour CTH post-TNK/thrombectomy demonstrated acute Right MCA infarct, consistent with initial CTH and no acute hemorrhage seen. NIHSS on 3/25/24 improved to 3. TTE/DEBBIE unremarkable. COVID pos 3/26. Pending AR and ILR placement.    Neuro  #Right MCA infarct status post TNK and thrombectomy with Initial NIHSS 17 improved to 3.   - continue on aspirin 81mg and plavix 75mg daily  - continue atorvastatin 40mg daily  - q4hr stroke neuro checks and vitals  - Stroke Code HCT Results: No gross acute intracranial hemorrhage or mass effect. Hyperdense right M1 MCA, concerning for thrombosis.  CTA COW: Abrupt right M1 occlusion with M2 reconstitution.  CTA NECK: Patent, ECAs, ICAs, no  hemodynamically significant stenosis at ICA origins by NASCET criteria. Bilateral vertebral arteries are patent without flow limiting stenosis.  - no need for MRI as stroke is seen on CTH  - Stroke education  - EP c/s for ILR placement - pending. Since pt is COVID(+), ILR placement is on hold for now.     #Headache  - Decadron 1 mg every 6 hours - d/c as headache is resolved  - Topiramate increased from 25mg to 50 mg once daily on 3/26   - d/c tramadol 2/2 sedation  - can also receive PRN IV tylenol for pain    Cards  #HTN  - permissive hypertension, Goal -180  - hold home blood pressure medication for now  - Echo w/bubble: Aortic sclerosis without significant stenosis.  An annuloplasty ring is noted in the mitral position. Mean   transvalvular gradient is 2.30 mmHg at a heart rate of 67 bpm.  < from: DEBBIE w/Doppler (03.26.24 @ 14:01) >   1. Normal left and right ventricular size and systolic function.   2. No LA/RA/DANIEL/RAA thrombus seen.   3. No evidence of an intracardiac shunt.   4. Mild aortic regurgitation.   5. An annuloplasty ring is noted in the mitral position. The mean   transvalvular gradient is 2.00 mmHg at a heart rate of 68 bpm. There is   trace mitral regurgitation.   6. No echo evidence of pulmonary HTN.   7. No pericardial effusion.    #HLD  - high dose statin as above in CVA  - LDL results: 59 mg/dL    Pulm  - call provider if SPO2 < 94%    GI  #Nutrition/Fluids/Electrolytes   - replete K<4 and Mg <2  - Diet: Soft and Bite-sized   - IVF: s/p NS 500cc bolus x 1 as SBP dropped to low 100s after standing the patient up, started maintenance fluids    Renal  - monitor and trend Cr    Infectious Disease  - CXR with Bilateral lower lobe linear opacities that likely represent atelectasis or less likely viral infection.    #COVID   - Remdesevir x 3 days   - COVID positive 3/26 - pending 10 day isolation for rehab  - COVID reswab 3/29 - positive    Endocrine  - A1C results: 5.5    - TSH results: 0.603      Urology  #BPH  - c/w finasteride and flomax    DVT Prophylaxis  - lovenox sq for DVT prophylaxis   - SCDs for DVT prophylaxis       IDR Goals: Goals reviewed at interdisciplinary rounds with case management, social work, physical therapy, occupational therapy, and speech language pathology.   Please see specific therapy  notes for in depth goals.  Dispo: AR - can tolerate 3 hours of therapy, can only go to rehab once off isolation      Discussed daily hospital plans and goals with patient and daughter at bedside.  Discussed with Dr. Sherwood and Dr. Muir

## 2024-03-30 DIAGNOSIS — U07.1 COVID-19: ICD-10-CM

## 2024-03-30 LAB
APPEARANCE UR: ABNORMAL
BACTERIA # UR AUTO: ABNORMAL /HPF
BILIRUB UR-MCNC: NEGATIVE — SIGNIFICANT CHANGE UP
CAST: 2 /LPF — SIGNIFICANT CHANGE UP (ref 0–4)
COLOR SPEC: SIGNIFICANT CHANGE UP
DIFF PNL FLD: ABNORMAL
GLUCOSE UR QL: NEGATIVE MG/DL — SIGNIFICANT CHANGE UP
KETONES UR-MCNC: ABNORMAL MG/DL
LEUKOCYTE ESTERASE UR-ACNC: NEGATIVE — SIGNIFICANT CHANGE UP
NITRITE UR-MCNC: NEGATIVE — SIGNIFICANT CHANGE UP
PH UR: 6 — SIGNIFICANT CHANGE UP (ref 5–8)
PROT UR-MCNC: NEGATIVE MG/DL — SIGNIFICANT CHANGE UP
RBC CASTS # UR COMP ASSIST: 8 /HPF — HIGH (ref 0–4)
SP GR SPEC: 1.02 — SIGNIFICANT CHANGE UP (ref 1–1.03)
SQUAMOUS # UR AUTO: 1 /HPF — SIGNIFICANT CHANGE UP (ref 0–5)
UROBILINOGEN FLD QL: 0.2 MG/DL — SIGNIFICANT CHANGE UP (ref 0.2–1)
WBC UR QL: 2 /HPF — SIGNIFICANT CHANGE UP (ref 0–5)

## 2024-03-30 PROCEDURE — 99233 SBSQ HOSP IP/OBS HIGH 50: CPT

## 2024-03-30 RX ORDER — ACETAMINOPHEN 500 MG
1000 TABLET ORAL ONCE
Refills: 0 | Status: COMPLETED | OUTPATIENT
Start: 2024-03-30 | End: 2024-03-30

## 2024-03-30 RX ADMIN — SENNA PLUS 2 TABLET(S): 8.6 TABLET ORAL at 21:24

## 2024-03-30 RX ADMIN — Medication 100 MILLIGRAM(S): at 07:16

## 2024-03-30 RX ADMIN — Medication 650 MILLIGRAM(S): at 12:16

## 2024-03-30 RX ADMIN — TAMSULOSIN HYDROCHLORIDE 0.4 MILLIGRAM(S): 0.4 CAPSULE ORAL at 21:24

## 2024-03-30 RX ADMIN — ENOXAPARIN SODIUM 40 MILLIGRAM(S): 100 INJECTION SUBCUTANEOUS at 17:14

## 2024-03-30 RX ADMIN — Medication 650 MILLIGRAM(S): at 12:45

## 2024-03-30 RX ADMIN — ATORVASTATIN CALCIUM 40 MILLIGRAM(S): 80 TABLET, FILM COATED ORAL at 21:24

## 2024-03-30 RX ADMIN — Medication 1000 MILLIGRAM(S): at 18:17

## 2024-03-30 RX ADMIN — Medication 3 MILLIGRAM(S): at 21:24

## 2024-03-30 RX ADMIN — Medication 400 MILLIGRAM(S): at 18:02

## 2024-03-30 RX ADMIN — Medication 81 MILLIGRAM(S): at 12:17

## 2024-03-30 RX ADMIN — CLOPIDOGREL BISULFATE 75 MILLIGRAM(S): 75 TABLET, FILM COATED ORAL at 12:17

## 2024-03-30 RX ADMIN — FINASTERIDE 5 MILLIGRAM(S): 5 TABLET, FILM COATED ORAL at 12:18

## 2024-03-30 RX ADMIN — LIDOCAINE 1 PATCH: 4 CREAM TOPICAL at 14:21

## 2024-03-30 RX ADMIN — LIDOCAINE 1 PATCH: 4 CREAM TOPICAL at 18:10

## 2024-03-30 RX ADMIN — POLYETHYLENE GLYCOL 3350 17 GRAM(S): 17 POWDER, FOR SOLUTION ORAL at 12:20

## 2024-03-30 NOTE — PROGRESS NOTE ADULT - ASSESSMENT
This is a 74 yo M w/ PMHx of HTN, BPH, HLD, mitral valve repair present to ED with left sided weakness, left facial droop, and left gaze preference, s/p TNK (3/24/24). Initial NIHSS 17 after TNK was administered, the score was reduced to 15. Stroke code imaging demonstrates right M1 occlusion, now s/p thrombectomy Right M1/2, TICI 0 to 2B (3/24/24). 24-hour CTH post-TNK/thrombectomy demonstrated acute Right MCA infarct, consistent with initial CTH and no acute hemorrhage seen. NIHSS on 3/25/24 improved to 3. TTE/DEBBIE unremarkable. COVID pos 3/26. Pending AR and ILR placement.    Plan:  Neuro  #Right MCA infarct status post TNK and thrombectomy with Initial NIHSS 17 improved to 3.   - Continue on Aspirin 81 mg and Plavix 75 mg daily  - Continue Atorvastatin 40 mg daily  - q4hr stroke neuro checks and vitals  - Stroke Code HCT Results: No gross acute intracranial hemorrhage or mass effect. Hyperdense right M1 MCA, concerning for thrombosis.  CTA COW: Abrupt right M1 occlusion with M2 reconstitution.  CTA NECK: Patent, ECAs, ICAs, no  hemodynamically significant stenosis at ICA origins by NASCET criteria. Bilateral vertebral arteries are patent without flow limiting stenosis.  - No need for MRI as stroke is seen on CTH  - Stroke education  - EP c/s for ILR placement - pending. Since pt is COVID(+), ILR placement is on hold for now    #Headache  - Decadron 1 mg every 6 hours - d/c as headache is resolved  - Topiramate increased from 25 mg to 50 mg once daily on 3/26   - D/c'd tramadol 2/2 sedation  - Can also receive PRN IV Tylenol for pain    Cardio  #HTN  - Okay to allow for permissive hypertension, Goal -180  - Okay to hold home blood pressure medication for now  - Echo w/ bubble: Aortic sclerosis without significant stenosis.  An annuloplasty ring is noted in the mitral position. Mean   transvalvular gradient is 2.30 mmHg at a heart rate of 67 bpm.  < from: DEBBIE w/Doppler (03.26.24 @ 14:01) >   1. Normal left and right ventricular size and systolic function.   2. No LA/RA/DANIEL/RAA thrombus seen.   3. No evidence of an intracardiac shunt.   4. Mild aortic regurgitation.   5. An annuloplasty ring is noted in the mitral position. The mean   transvalvular gradient is 2.00 mmHg at a heart rate of 68 bpm. There is   trace mitral regurgitation.   6. No echo evidence of pulmonary HTN.   7. No pericardial effusion.    #HLD  - Continue high dose statin as above in CVA  - LDL results: 59 mg/dL    Pulm  - Place call to provider if SpO2 < 92%    GI  #Nutrition/Fluids/Electrolytes   - Replete K <4 and Mg <2 PRN  - Diet: Soft and Bite-sized   - IVF: S/p NS 500cc bolus x 1 as SBP dropped to low 100s after standing the patient up, started maintenance fluids    Renal  - Monitor and trend Cr  - F/u daily BMPs    Infectious Disease  - CXR with Bilateral lower lobe linear opacities that likely represent atelectasis or less likely viral infection.    #COVID   - Remdesevir x 3 days   - COVID positive 3/26 - pending 10 day isolation for rehab  - COVID reswab 3/29 - positive    Endocrine  - A1C results: 5.5  - TSH results: 0.603      Urology  #BPH  - Continue finasteride and flomax    DVT Prophylaxis  - Lovenox sq for DVT prophylaxis   - SCDs for DVT prophylaxis       IDR Goals: Goals reviewed at interdisciplinary rounds with case management, social work, physical therapy, occupational therapy, and speech language pathology  Please see specific therapy notes for in depth goals  Dispo: AR - can tolerate 3 hours of therapy, can only go to rehab once off isolation  This is a 72 yo M w/ PMHx of HTN, BPH, HLD, mitral valve repair present to ED with left sided weakness, left facial droop, and left gaze preference, s/p TNK (3/24/24). Initial NIHSS 17 after TNK was administered, the score was reduced to 15. Stroke code imaging demonstrates right M1 occlusion, now s/p thrombectomy Right M1/2, TICI 0 to 2B (3/24/24). 24-hour CTH post-TNK/thrombectomy demonstrated acute Right MCA infarct, consistent with initial CTH and no acute hemorrhage seen. NIHSS on 3/25/24 improved to 3. TTE/DEBBIE unremarkable. COVID pos 3/26. Pending AR and ILR placement.    Plan:  Neuro  #Right MCA infarct status post TNK and thrombectomy with Initial NIHSS 17 improved to 3.   - Continue on Aspirin 81 mg and Plavix 75 mg daily  - Continue Atorvastatin 40 mg daily  - q4hr stroke neuro checks and vitals  - Stroke Code HCT Results: No gross acute intracranial hemorrhage or mass effect. Hyperdense right M1 MCA, concerning for thrombosis.  CTA COW: Abrupt right M1 occlusion with M2 reconstitution.  CTA NECK: Patent, ECAs, ICAs, no  hemodynamically significant stenosis at ICA origins by NASCET criteria. Bilateral vertebral arteries are patent without flow limiting stenosis.  - No need for MRI as stroke is seen on CTH  - Stroke education  - EP c/s for ILR placement - pending. Since pt is COVID(+), ILR placement is on hold for now    #Headache, resolved  - Decadron 1 mg every 6 hours - d/c as headache is resolved  - Topiramate increased from 25 mg to 50 mg once daily on 3/26   - D/c'd tramadol 2/2 sedation  - Can also receive PRN IV Tylenol for pain    Cardio  #HTN  - Okay to allow for permissive hypertension, Goal -180  - Okay to hold home blood pressure medication for now  - Echo w/ bubble: Aortic sclerosis without significant stenosis.  An annuloplasty ring is noted in the mitral position. Mean   transvalvular gradient is 2.30 mmHg at a heart rate of 67 bpm.  < from: DEBBIE w/Doppler (03.26.24 @ 14:01) >   1. Normal left and right ventricular size and systolic function.   2. No LA/RA/DANIEL/RAA thrombus seen.   3. No evidence of an intracardiac shunt.   4. Mild aortic regurgitation.   5. An annuloplasty ring is noted in the mitral position. The mean   transvalvular gradient is 2.00 mmHg at a heart rate of 68 bpm. There is   trace mitral regurgitation.   6. No echo evidence of pulmonary HTN.   7. No pericardial effusion.    #HLD  - Continue high dose statin as above in CVA  - LDL results: 59 mg/dL    Pulm  - Place call to provider if SpO2 < 92%    GI  #Nutrition/Fluids/Electrolytes   - Replete K <4 and Mg <2 PRN  - Diet: Soft and Bite-sized     Renal  - Monitor and trend Cr  - F/u daily BMPs    Infectious Disease  - CXR with Bilateral lower lobe linear opacities that likely represent atelectasis or viral infection    #COVID   - Remdesevir x 3 days   - COVID positive 3/26 - pending 10 day isolation for rehab  - COVID reswab 3/29 - positive  - Re swab on Monday    Endocrine  - A1C results: 5.5%  - TSH results: 0.603      Urology  #BPH  - Continue finasteride and flomax    DVT Prophylaxis  - Lovenox subq for DVT prophylaxis   - SCDs for DVT prophylaxis       IDR Goals: Goals reviewed at interdisciplinary rounds with case management, social work, physical therapy, occupational therapy, and speech language pathology  Please see specific therapy notes for in depth goals  Dispo: AR - can tolerate 3 hours of therapy, can only go to rehab once off isolation

## 2024-03-30 NOTE — PROGRESS NOTE ADULT - ASSESSMENT
74 y/o M w/PMHX of HTN, BPH, HLD, mitral valve repair present to ED with left sided weakness, left facial droop, and left gaze preference, s/p TNK (3/24/24). Initial NIHSS 17 after TNK was administered, the score was reduced to 15. Stroke code imaging demonstrates right M1 occlusion, s/p thrombectomy Right M1/2, TICI 0 to 2B (3/24/24). 24-hour CTH post-TNK/thrombectomy demonstrated acute Right MCA infarct, consistent with initial CTH and no acute hemorrhage seen. NIHSS on 3/25/24 improved to 3.

## 2024-03-30 NOTE — PROGRESS NOTE ADULT - SUBJECTIVE AND OBJECTIVE BOX
Patient is a 73y old  Male who presents with a chief complaint of Acute Stroke (30 Mar 2024 10:32)    INTERVAL EVENTS:      SUBJECTIVE:      MEDICATIONS:  MEDICATIONS  (STANDING):  aspirin enteric coated 81 milliGRAM(s) Oral daily  atorvastatin 40 milliGRAM(s) Oral at bedtime  benzonatate 100 milliGRAM(s) Oral every 8 hours  clopidogrel Tablet 75 milliGRAM(s) Oral daily  enoxaparin Injectable 40 milliGRAM(s) SubCutaneous every 24 hours  finasteride 5 milliGRAM(s) Oral daily  lidocaine   4% Patch 1 Patch Transdermal every 24 hours  lidocaine   4% Patch 1 Patch Transdermal daily  melatonin 3 milliGRAM(s) Oral at bedtime  polyethylene glycol 3350 17 Gram(s) Oral daily  senna 2 Tablet(s) Oral at bedtime  tamsulosin 0.4 milliGRAM(s) Oral at bedtime  topiramate 50 milliGRAM(s) Oral every 24 hours    MEDICATIONS  (PRN):  acetaminophen     Tablet .. 650 milliGRAM(s) Oral every 6 hours PRN Temp greater or equal to 38C (100.4F), Mild Pain (1 - 3)  benzocaine/menthol Lozenge 1 Lozenge Oral three times a day PRN Sore Throat  ondansetron Injectable 4 milliGRAM(s) IV Push every 8 hours PRN Nausea and/or Vomiting      Allergies    Allergy Status Unknown    Intolerances        OBJECTIVE:  Vital Signs Last 24 Hrs  T(C): 36.6 (30 Mar 2024 10:52), Max: 36.7 (29 Mar 2024 14:44)  T(F): 97.8 (30 Mar 2024 10:52), Max: 98.1 (29 Mar 2024 18:06)  HR: 74 (30 Mar 2024 08:20) (62 - 88)  BP: 107/68 (30 Mar 2024 08:20) (107/68 - 147/70)  BP(mean): 82 (30 Mar 2024 08:20) (82 - 113)  RR: 20 (30 Mar 2024 08:20) (17 - 21)  SpO2: 98% (30 Mar 2024 08:20) (95% - 99%)    Parameters below as of 30 Mar 2024 08:20  Patient On (Oxygen Delivery Method): room air      I&O's Summary    29 Mar 2024 07:01  -  30 Mar 2024 07:00  --------------------------------------------------------  IN: 236 mL / OUT: 800 mL / NET: -564 mL    30 Mar 2024 07:01  -  30 Mar 2024 12:07  --------------------------------------------------------  IN: 300 mL / OUT: 400 mL / NET: -100 mL        PHYSICAL EXAM:      LABS:                        13.8   8.40  )-----------( 181      ( 29 Mar 2024 05:30 )             40.0     03-29    139  |  107  |  13  ----------------------------<  113<H>  3.7   |  24  |  0.70    Ca    9.5      29 Mar 2024 05:30  Phos  2.9     03-29  Mg     2.0     03-29          CAPILLARY BLOOD GLUCOSE        Urinalysis Basic - ( 29 Mar 2024 05:30 )    Color: x / Appearance: x / SG: x / pH: x  Gluc: 113 mg/dL / Ketone: x  / Bili: x / Urobili: x   Blood: x / Protein: x / Nitrite: x   Leuk Esterase: x / RBC: x / WBC x   Sq Epi: x / Non Sq Epi: x / Bacteria: x        MICRODATA:      RADIOLOGY/OTHER STUDIES:   Patient is a 73y old  Male who presents with a chief complaint of Acute Stroke (30 Mar 2024 10:32)    SUBJECTIVE:  Seen and examined at bedside. No respiratory complaints. No BM x 2 days. Voiding without difficulty. Reports discomfort in lower back which he attributes to laying in bed for a few days. Otherwise no complaints.   Rest of 12 point ROS negative, except where noted above.     MEDICATIONS:  MEDICATIONS  (STANDING):  aspirin enteric coated 81 milliGRAM(s) Oral daily  atorvastatin 40 milliGRAM(s) Oral at bedtime  benzonatate 100 milliGRAM(s) Oral every 8 hours  clopidogrel Tablet 75 milliGRAM(s) Oral daily  enoxaparin Injectable 40 milliGRAM(s) SubCutaneous every 24 hours  finasteride 5 milliGRAM(s) Oral daily  lidocaine   4% Patch 1 Patch Transdermal every 24 hours  lidocaine   4% Patch 1 Patch Transdermal daily  melatonin 3 milliGRAM(s) Oral at bedtime  polyethylene glycol 3350 17 Gram(s) Oral daily  senna 2 Tablet(s) Oral at bedtime  tamsulosin 0.4 milliGRAM(s) Oral at bedtime  topiramate 50 milliGRAM(s) Oral every 24 hours    MEDICATIONS  (PRN):  acetaminophen     Tablet .. 650 milliGRAM(s) Oral every 6 hours PRN Temp greater or equal to 38C (100.4F), Mild Pain (1 - 3)  benzocaine/menthol Lozenge 1 Lozenge Oral three times a day PRN Sore Throat  ondansetron Injectable 4 milliGRAM(s) IV Push every 8 hours PRN Nausea and/or Vomiting      Allergies    Allergy Status Unknown    Intolerances        OBJECTIVE:  Vital Signs Last 24 Hrs  T(C): 36.6 (30 Mar 2024 10:52), Max: 36.7 (29 Mar 2024 14:44)  T(F): 97.8 (30 Mar 2024 10:52), Max: 98.1 (29 Mar 2024 18:06)  HR: 74 (30 Mar 2024 08:20) (62 - 88)  BP: 107/68 (30 Mar 2024 08:20) (107/68 - 147/70)  BP(mean): 82 (30 Mar 2024 08:20) (82 - 113)  RR: 20 (30 Mar 2024 08:20) (17 - 21)  SpO2: 98% (30 Mar 2024 08:20) (95% - 99%)    Parameters below as of 30 Mar 2024 08:20  Patient On (Oxygen Delivery Method): room air      I&O's Summary    29 Mar 2024 07:01  -  30 Mar 2024 07:00  --------------------------------------------------------  IN: 236 mL / OUT: 800 mL / NET: -564 mL    30 Mar 2024 07:01  -  30 Mar 2024 12:07  --------------------------------------------------------  IN: 300 mL / OUT: 400 mL / NET: -100 mL        PHYSICAL EXAM:  General: NAD  HEENT: NC/AT; PERRL, clear conjunctiva  Neck: supple  Respiratory: CTA b/l  Cardiovascular: +S1/S2; RRR  Abdomen: soft, NT/ND; +BS x4  Extremities: 2+ peripheral pulses b/l; no LE edema  Skin: normal color and turgor; no rash  Neurological: AAO x 3. no FND.  Psychiatry: appropriate mood/affect       LABS:                        13.8   8.40  )-----------( 181      ( 29 Mar 2024 05:30 )             40.0     03-29    139  |  107  |  13  ----------------------------<  113<H>  3.7   |  24  |  0.70    Ca    9.5      29 Mar 2024 05:30  Phos  2.9     03-29  Mg     2.0     03-29          CAPILLARY BLOOD GLUCOSE        Urinalysis Basic - ( 29 Mar 2024 05:30 )    Color: x / Appearance: x / SG: x / pH: x  Gluc: 113 mg/dL / Ketone: x  / Bili: x / Urobili: x   Blood: x / Protein: x / Nitrite: x   Leuk Esterase: x / RBC: x / WBC x   Sq Epi: x / Non Sq Epi: x / Bacteria: x        MICRODATA:      RADIOLOGY/OTHER STUDIES:    < from: CT Head No Cont (03.25.24 @ 11:19) >  IMPRESSION: Acute right MCA infarct.    < end of copied text >      < from: CT Angio Neck Stroke Protocol w/ IV Cont (03.24.24 @ 08:55) >  IMPRESSION:    CT PERFUSION: Small core infarct, large ischemic penumbra in the right   MCA territory.    CTA COW: Abrupt right M1 occlusion with M2 reconstitution.    CTA NECK: Patent, ECAs, ICAs, no  hemodynamically significant stenosis at    ICA origins by NASCET criteria.  Bilateral vertebral arteries are patent without flow limiting stenosis.    --- End of Report ---    < end of copied text >      < from: CT Brain Stroke Protocol (03.24.24 @ 08:30) >  IMPRESSION:  Motion limited study.  No gross acute intracranial hemorrhage or mass effect.  Hyperdense right M1 MCA, concerning for thrombosis.      < end of copied text >  < from: Xray Chest 1 View- PORTABLE-Urgent (Xray Chest 1 View- PORTABLE-Urgent .) (03.25.24 @ 22:45) >    IMPRESSION: Bilateral lower lobe linear opacities that likely represent   atelectasis or less likely viral infection such as pelvic. The heart is   normal in size. The bones are intact.      < end of copied text >

## 2024-03-30 NOTE — PROGRESS NOTE ADULT - PROBLEM SELECTOR PLAN 2
Started on Remdesivir. Currently not in any respiratory distress.   - monitor O2  -  if hypoxia will start Dexamethasone to 6 mg daily.   - Monitor LFTs and creatinine while on Remdesivir.   - A/C per primary team    # Constipation  On bowel regimen.   Ensure having BMs. Otherwise increase bowel regimen. Can give one time dulcolax.

## 2024-03-30 NOTE — PROGRESS NOTE ADULT - SUBJECTIVE AND OBJECTIVE BOX
Neurology Progress Note    Interval History:    Patient seen and examined at bedside. There were no acute events overnight.       PAST MEDICAL & SURGICAL HISTORY:  Hypertension  History of BPH  CVA (cerebrovascular accident)  Hyperlipidemia      Medications:  acetaminophen     Tablet .. 650 milliGRAM(s) Oral every 6 hours PRN  aspirin enteric coated 81 milliGRAM(s) Oral daily  atorvastatin 40 milliGRAM(s) Oral at bedtime  benzocaine/menthol Lozenge 1 Lozenge Oral three times a day PRN  benzonatate 100 milliGRAM(s) Oral every 8 hours  clopidogrel Tablet 75 milliGRAM(s) Oral daily  enoxaparin Injectable 40 milliGRAM(s) SubCutaneous every 24 hours  finasteride 5 milliGRAM(s) Oral daily  lidocaine   4% Patch 1 Patch Transdermal daily  lidocaine   4% Patch 1 Patch Transdermal every 24 hours  melatonin 3 milliGRAM(s) Oral at bedtime  ondansetron Injectable 4 milliGRAM(s) IV Push every 8 hours PRN  polyethylene glycol 3350 17 Gram(s) Oral daily  senna 2 Tablet(s) Oral at bedtime  tamsulosin 0.4 milliGRAM(s) Oral at bedtime  topiramate 50 milliGRAM(s) Oral every 24 hours      Vital Signs Last 24 Hrs  T(C): 36.6 (30 Mar 2024 10:52), Max: 36.7 (29 Mar 2024 14:44)  T(F): 97.8 (30 Mar 2024 10:52), Max: 98.1 (29 Mar 2024 18:06)  HR: 74 (30 Mar 2024 08:20) (62 - 88)  BP: 107/68 (30 Mar 2024 08:20) (107/68 - 147/70)  BP(mean): 82 (30 Mar 2024 08:20) (82 - 113)  RR: 20 (30 Mar 2024 08:20) (17 - 21)  SpO2: 98% (30 Mar 2024 08:20) (95% - 99%)    Parameters below as of 30 Mar 2024 08:20  Patient On (Oxygen Delivery Method): room air      Neurological Exam:   Mental status: Awake, alert and oriented x3. No dysarthria, no aphasia. Follows commands.  Cranial nerves: Pupils equally round and reactive to light, visual fields full, no nystagmus, extraocular muscles intact, V1 through V3 intact bilaterally and symmetric, face asymmetric on left, hearing intact to finger rub, palate elevation symmetric, tongue was midline.  Motor: MRC grading 4+/5 LUE/LLE, 5/5 in RUE/RLE.  strength 5/5. Normal tone and bulk. No abnormal movements.    Sensation: Intact to light touch in all extremities.  Coordination: No dysmetria on finger-to-nose testing.  Reflexes: 2+ in bilateral UE/LE, downgoing toes bilaterally.  Gait: Deferred.      Labs:  CBC Full  -  ( 29 Mar 2024 05:30 )  WBC Count : 8.40 K/uL  RBC Count : 4.16 M/uL  Hemoglobin : 13.8 g/dL  Hematocrit : 40.0 %  Platelet Count - Automated : 181 K/uL  Mean Cell Volume : 96.2 fl  Mean Cell Hemoglobin : 33.2 pg  Mean Cell Hemoglobin Concentration : 34.5 gm/dL    03-29    139  |  107  |  13  ----------------------------<  113<H>  3.7   |  24  |  0.70    Ca    9.5      29 Mar 2024 05:30  Phos  2.9     03-29  Mg     2.0     03-29    Urinalysis Basic - ( 29 Mar 2024 05:30 )    Color: x / Appearance: x / SG: x / pH: x  Gluc: 113 mg/dL / Ketone: x  / Bili: x / Urobili: x   Blood: x / Protein: x / Nitrite: x   Leuk Esterase: x / RBC: x / WBC x   Sq Epi: x / Non Sq Epi: x / Bacteria: x

## 2024-03-31 DIAGNOSIS — R31.9 HEMATURIA, UNSPECIFIED: ICD-10-CM

## 2024-03-31 LAB
APPEARANCE UR: CLEAR — SIGNIFICANT CHANGE UP
APPEARANCE UR: CLEAR — SIGNIFICANT CHANGE UP
BACTERIA # UR AUTO: NEGATIVE /HPF — SIGNIFICANT CHANGE UP
BILIRUB UR-MCNC: NEGATIVE — SIGNIFICANT CHANGE UP
BILIRUB UR-MCNC: NEGATIVE — SIGNIFICANT CHANGE UP
COLOR SPEC: SIGNIFICANT CHANGE UP
COLOR SPEC: YELLOW — SIGNIFICANT CHANGE UP
DIFF PNL FLD: ABNORMAL
DIFF PNL FLD: NEGATIVE — SIGNIFICANT CHANGE UP
GLUCOSE UR QL: NEGATIVE MG/DL — SIGNIFICANT CHANGE UP
GLUCOSE UR QL: NEGATIVE MG/DL — SIGNIFICANT CHANGE UP
HCT VFR BLD CALC: 39.5 % — SIGNIFICANT CHANGE UP (ref 39–50)
HGB BLD-MCNC: 13.6 G/DL — SIGNIFICANT CHANGE UP (ref 13–17)
KETONES UR-MCNC: ABNORMAL MG/DL
KETONES UR-MCNC: NEGATIVE MG/DL — SIGNIFICANT CHANGE UP
LEUKOCYTE ESTERASE UR-ACNC: NEGATIVE — SIGNIFICANT CHANGE UP
LEUKOCYTE ESTERASE UR-ACNC: NEGATIVE — SIGNIFICANT CHANGE UP
MCHC RBC-ENTMCNC: 33.3 PG — SIGNIFICANT CHANGE UP (ref 27–34)
MCHC RBC-ENTMCNC: 34.4 GM/DL — SIGNIFICANT CHANGE UP (ref 32–36)
MCV RBC AUTO: 96.6 FL — SIGNIFICANT CHANGE UP (ref 80–100)
NITRITE UR-MCNC: NEGATIVE — SIGNIFICANT CHANGE UP
NITRITE UR-MCNC: NEGATIVE — SIGNIFICANT CHANGE UP
NRBC # BLD: 0 /100 WBCS — SIGNIFICANT CHANGE UP (ref 0–0)
PH UR: 6 — SIGNIFICANT CHANGE UP (ref 5–8)
PH UR: 6.5 — SIGNIFICANT CHANGE UP (ref 5–8)
PLATELET # BLD AUTO: 198 K/UL — SIGNIFICANT CHANGE UP (ref 150–400)
PROT UR-MCNC: NEGATIVE MG/DL — SIGNIFICANT CHANGE UP
PROT UR-MCNC: NEGATIVE MG/DL — SIGNIFICANT CHANGE UP
RBC # BLD: 4.09 M/UL — LOW (ref 4.2–5.8)
RBC # FLD: 12.3 % — SIGNIFICANT CHANGE UP (ref 10.3–14.5)
RBC CASTS # UR COMP ASSIST: 204 /HPF — HIGH (ref 0–4)
SP GR SPEC: 1.01 — SIGNIFICANT CHANGE UP (ref 1–1.03)
SP GR SPEC: 1.02 — SIGNIFICANT CHANGE UP (ref 1–1.03)
SQUAMOUS # UR AUTO: 0 /HPF — SIGNIFICANT CHANGE UP (ref 0–5)
UROBILINOGEN FLD QL: 1 MG/DL — SIGNIFICANT CHANGE UP (ref 0.2–1)
UROBILINOGEN FLD QL: 1 MG/DL — SIGNIFICANT CHANGE UP (ref 0.2–1)
WBC # BLD: 8.19 K/UL — SIGNIFICANT CHANGE UP (ref 3.8–10.5)
WBC # FLD AUTO: 8.19 K/UL — SIGNIFICANT CHANGE UP (ref 3.8–10.5)
WBC UR QL: 1 /HPF — SIGNIFICANT CHANGE UP (ref 0–5)

## 2024-03-31 PROCEDURE — 99232 SBSQ HOSP IP/OBS MODERATE 35: CPT

## 2024-03-31 RX ORDER — SODIUM CHLORIDE 9 MG/ML
500 INJECTION, SOLUTION INTRAVENOUS ONCE
Refills: 0 | Status: COMPLETED | OUTPATIENT
Start: 2024-03-31 | End: 2024-03-31

## 2024-03-31 RX ORDER — SENNA PLUS 8.6 MG/1
2 TABLET ORAL ONCE
Refills: 0 | Status: COMPLETED | OUTPATIENT
Start: 2024-03-31 | End: 2024-03-31

## 2024-03-31 RX ORDER — SODIUM CHLORIDE 9 MG/ML
1000 INJECTION INTRAMUSCULAR; INTRAVENOUS; SUBCUTANEOUS
Refills: 0 | Status: DISCONTINUED | OUTPATIENT
Start: 2024-03-31 | End: 2024-04-05

## 2024-03-31 RX ORDER — SENNA PLUS 8.6 MG/1
2 TABLET ORAL
Refills: 0 | Status: DISCONTINUED | OUTPATIENT
Start: 2024-03-31 | End: 2024-04-05

## 2024-03-31 RX ADMIN — SODIUM CHLORIDE 75 MILLILITER(S): 9 INJECTION INTRAMUSCULAR; INTRAVENOUS; SUBCUTANEOUS at 19:25

## 2024-03-31 RX ADMIN — LIDOCAINE 1 PATCH: 4 CREAM TOPICAL at 11:23

## 2024-03-31 RX ADMIN — Medication 100 MILLIGRAM(S): at 11:34

## 2024-03-31 RX ADMIN — FINASTERIDE 5 MILLIGRAM(S): 5 TABLET, FILM COATED ORAL at 11:23

## 2024-03-31 RX ADMIN — TAMSULOSIN HYDROCHLORIDE 0.4 MILLIGRAM(S): 0.4 CAPSULE ORAL at 21:55

## 2024-03-31 RX ADMIN — LIDOCAINE 1 PATCH: 4 CREAM TOPICAL at 02:51

## 2024-03-31 RX ADMIN — ENOXAPARIN SODIUM 40 MILLIGRAM(S): 100 INJECTION SUBCUTANEOUS at 16:46

## 2024-03-31 RX ADMIN — CLOPIDOGREL BISULFATE 75 MILLIGRAM(S): 75 TABLET, FILM COATED ORAL at 11:23

## 2024-03-31 RX ADMIN — Medication 650 MILLIGRAM(S): at 07:27

## 2024-03-31 RX ADMIN — Medication 81 MILLIGRAM(S): at 11:22

## 2024-03-31 RX ADMIN — LIDOCAINE 1 PATCH: 4 CREAM TOPICAL at 20:21

## 2024-03-31 RX ADMIN — Medication 3 MILLIGRAM(S): at 21:55

## 2024-03-31 RX ADMIN — SODIUM CHLORIDE 500 MILLILITER(S): 9 INJECTION, SOLUTION INTRAVENOUS at 14:08

## 2024-03-31 RX ADMIN — Medication 650 MILLIGRAM(S): at 19:23

## 2024-03-31 RX ADMIN — Medication 50 MILLIGRAM(S): at 21:54

## 2024-03-31 RX ADMIN — ATORVASTATIN CALCIUM 40 MILLIGRAM(S): 80 TABLET, FILM COATED ORAL at 21:55

## 2024-03-31 RX ADMIN — Medication 10 MILLIGRAM(S): at 11:23

## 2024-03-31 RX ADMIN — SENNA PLUS 2 TABLET(S): 8.6 TABLET ORAL at 13:24

## 2024-03-31 RX ADMIN — Medication 100 MILLIGRAM(S): at 19:23

## 2024-03-31 RX ADMIN — Medication 650 MILLIGRAM(S): at 07:05

## 2024-03-31 NOTE — PROGRESS NOTE ADULT - ASSESSMENT
This is a 74 yo M w/ PMHx of HTN, BPH, HLD, mitral valve repair present to ED with left sided weakness, left facial droop, and left gaze preference, s/p TNK (3/24/24). Initial NIHSS 17 after TNK was administered, the score was reduced to 15. Stroke code imaging demonstrates right M1 occlusion, now s/p thrombectomy Right M1/2, TICI 0 to 2B (3/24/24). 24-hour CTH post-TNK/thrombectomy demonstrated acute Right MCA infarct, consistent with initial CTH and no acute hemorrhage seen. NIHSS on 3/25/24 improved to 3. TTE/DEBBIE unremarkable. COVID pos 3/26. Pending AR and ILR placement.    Plan:  Neuro  #Right MCA infarct status post TNK and thrombectomy with Initial NIHSS 17 improved to 3.   - Continue on Aspirin 81 mg and Plavix 75 mg daily  - Continue Atorvastatin 40 mg daily  - q4hr stroke neuro checks and vitals  - Stroke Code HCT Results: No gross acute intracranial hemorrhage or mass effect. Hyperdense right M1 MCA, concerning for thrombosis.  CTA COW: Abrupt right M1 occlusion with M2 reconstitution.  CTA NECK: Patent, ECAs, ICAs, no  hemodynamically significant stenosis at ICA origins by NASCET criteria. Bilateral vertebral arteries are patent without flow limiting stenosis.  - No need for MRI as stroke is seen on CTH  - Stroke education  - EP c/s for ILR placement - pending. Since pt is COVID(+), ILR placement is on hold for now    #Headache, resolved  - Decadron 1 mg every 6 hours - d/c as headache is resolved  - Topiramate increased from 25 mg to 50 mg once daily on 3/26   - D/c'd tramadol 2/2 sedation  - Can also receive PRN IV Tylenol for pain    Cardio  #HTN  - Okay to allow for permissive hypertension, Goal -180  - Okay to hold home blood pressure medication for now  - Echo w/ bubble: Aortic sclerosis without significant stenosis.  An annuloplasty ring is noted in the mitral position. Mean   transvalvular gradient is 2.30 mmHg at a heart rate of 67 bpm.  < from: DEBBIE w/Doppler (03.26.24 @ 14:01) >   1. Normal left and right ventricular size and systolic function.   2. No LA/RA/DANIEL/RAA thrombus seen.   3. No evidence of an intracardiac shunt.   4. Mild aortic regurgitation.   5. An annuloplasty ring is noted in the mitral position. The mean   transvalvular gradient is 2.00 mmHg at a heart rate of 68 bpm. There is   trace mitral regurgitation.   6. No echo evidence of pulmonary HTN.   7. No pericardial effusion.    #HLD  - Continue high dose statin as above in CVA  - LDL results: 59 mg/dL    Pulm  - Place call to provider if SpO2 < 92%    GI  #Nutrition/Fluids/Electrolytes   - Replete K <4 and Mg <2 PRN  - Diet: Soft and Bite-sized     Renal  - Monitor and trend Cr  - F/u daily BMPs    Infectious Disease  - CXR with Bilateral lower lobe linear opacities that likely represent atelectasis or viral infection    #COVID   - Remdesevir x 3 days   - COVID positive 3/26 - pending 10 day isolation for rehab  - COVID reswab 3/29 - positive  - Re swab on Monday    Endocrine  - A1C results: 5.5%  - TSH results: 0.603      Urology  #BPH  - Continue finasteride and flomax    # Hamaturia  Patient with hematuria, unclear source and etiology, hematuria now resolved.   - Will continue to hold Plavix for now  - If hematuria recurs, obtain CT urogram and urology consult      DVT Prophylaxis  - Lovenox subq for DVT prophylaxis   - SCDs for DVT prophylaxis     Dispo: AR - can tolerate 3 hours of therapy, can only go to rehab once off isolation

## 2024-03-31 NOTE — PROGRESS NOTE ADULT - SUBJECTIVE AND OBJECTIVE BOX
Patient is a 73y old  Male who presents with a chief complaint of Acute Stroke (31 Mar 2024 07:18)    INTERVAL EVENTS:      SUBJECTIVE:      MEDICATIONS:  MEDICATIONS  (STANDING):  aspirin enteric coated 81 milliGRAM(s) Oral daily  atorvastatin 40 milliGRAM(s) Oral at bedtime  benzonatate 100 milliGRAM(s) Oral every 8 hours  clopidogrel Tablet 75 milliGRAM(s) Oral daily  enoxaparin Injectable 40 milliGRAM(s) SubCutaneous every 24 hours  finasteride 5 milliGRAM(s) Oral daily  lidocaine   4% Patch 1 Patch Transdermal every 24 hours  lidocaine   4% Patch 1 Patch Transdermal daily  melatonin 3 milliGRAM(s) Oral at bedtime  polyethylene glycol 3350 17 Gram(s) Oral daily  senna 2 Tablet(s) Oral at bedtime  tamsulosin 0.4 milliGRAM(s) Oral at bedtime  topiramate 50 milliGRAM(s) Oral every 24 hours    MEDICATIONS  (PRN):  acetaminophen     Tablet .. 650 milliGRAM(s) Oral every 6 hours PRN Temp greater or equal to 38C (100.4F), Mild Pain (1 - 3)  benzocaine/menthol Lozenge 1 Lozenge Oral three times a day PRN Sore Throat  ondansetron Injectable 4 milliGRAM(s) IV Push every 8 hours PRN Nausea and/or Vomiting      Allergies    Allergy Status Unknown    Intolerances        OBJECTIVE:  Vital Signs Last 24 Hrs  T(C): 36.4 (31 Mar 2024 05:31), Max: 36.8 (30 Mar 2024 14:00)  T(F): 97.6 (31 Mar 2024 05:31), Max: 98.2 (30 Mar 2024 14:00)  HR: 83 (31 Mar 2024 08:20) (65 - 85)  BP: 97/60 (31 Mar 2024 08:20) (97/60 - 110/71)  BP(mean): 73 (31 Mar 2024 08:20) (73 - 86)  RR: 20 (31 Mar 2024 08:20) (18 - 20)  SpO2: 99% (31 Mar 2024 08:20) (98% - 100%)    Parameters below as of 31 Mar 2024 08:20  Patient On (Oxygen Delivery Method): room air      I&O's Summary    30 Mar 2024 07:01  -  31 Mar 2024 07:00  --------------------------------------------------------  IN: 1375 mL / OUT: 2250 mL / NET: -875 mL        PHYSICAL EXAM:      LABS:              CAPILLARY BLOOD GLUCOSE        Urinalysis Basic - ( 31 Mar 2024 02:47 )    Color: Yellow / Appearance: Clear / S.014 / pH: x  Gluc: x / Ketone: Negative mg/dL  / Bili: Negative / Urobili: 1.0 mg/dL   Blood: x / Protein: Negative mg/dL / Nitrite: Negative   Leuk Esterase: Negative / RBC: 204 /HPF / WBC 1 /HPF   Sq Epi: x / Non Sq Epi: 0 /HPF / Bacteria: Negative /HPF        MICRODATA:      RADIOLOGY/OTHER STUDIES:   Patient is a 73y old  Male who presents with a chief complaint of Acute Stroke (31 Mar 2024 07:18)    INTERVAL EVENTS:  Hematuria noted.     SUBJECTIVE:  No complaints. No BM still. Patient reports no dysuria but thinks that hematuria may have been due to urethral bleeding related to [place] condom catheter, however denies any irrituation from the condom cath.   DId not sleep well last night and is tired. No respiratory complaints.   Rest of 12 point ROS negative, except where noted above.       MEDICATIONS:  MEDICATIONS  (STANDING):  aspirin enteric coated 81 milliGRAM(s) Oral daily  atorvastatin 40 milliGRAM(s) Oral at bedtime  benzonatate 100 milliGRAM(s) Oral every 8 hours  clopidogrel Tablet 75 milliGRAM(s) Oral daily  enoxaparin Injectable 40 milliGRAM(s) SubCutaneous every 24 hours  finasteride 5 milliGRAM(s) Oral daily  lidocaine   4% Patch 1 Patch Transdermal every 24 hours  lidocaine   4% Patch 1 Patch Transdermal daily  melatonin 3 milliGRAM(s) Oral at bedtime  polyethylene glycol 3350 17 Gram(s) Oral daily  senna 2 Tablet(s) Oral at bedtime  tamsulosin 0.4 milliGRAM(s) Oral at bedtime  topiramate 50 milliGRAM(s) Oral every 24 hours    MEDICATIONS  (PRN):  acetaminophen     Tablet .. 650 milliGRAM(s) Oral every 6 hours PRN Temp greater or equal to 38C (100.4F), Mild Pain (1 - 3)  benzocaine/menthol Lozenge 1 Lozenge Oral three times a day PRN Sore Throat  ondansetron Injectable 4 milliGRAM(s) IV Push every 8 hours PRN Nausea and/or Vomiting      Allergies    Allergy Status Unknown    Intolerances        OBJECTIVE:  Vital Signs Last 24 Hrs  T(C): 36.4 (31 Mar 2024 05:31), Max: 36.8 (30 Mar 2024 14:00)  T(F): 97.6 (31 Mar 2024 05:31), Max: 98.2 (30 Mar 2024 14:00)  HR: 83 (31 Mar 2024 08:20) (65 - 85)  BP: 97/60 (31 Mar 2024 08:20) (97/60 - 110/71)  BP(mean): 73 (31 Mar 2024 08:20) (73 - 86)  RR: 20 (31 Mar 2024 08:20) (18 - 20)  SpO2: 99% (31 Mar 2024 08:20) (98% - 100%)    Parameters below as of 31 Mar 2024 08:20  Patient On (Oxygen Delivery Method): room air      I&O's Summary    30 Mar 2024 07:01  -  31 Mar 2024 07:00  --------------------------------------------------------  IN: 1375 mL / OUT: 2250 mL / NET: -875 mL      PHYSICAL EXAM:  General: NAD  HEENT: NC/AT; PERRL, clear conjunctiva  Neck: supple  Respiratory: CTA b/l  Cardiovascular: +S1/S2; RRR  Abdomen: soft, NT/ND; +BS x4  Extremities: 2+ peripheral pulses b/l; no LE edema  Skin: normal color and turgor; no rash  Neurological: AAO x 3. no FND.  Psychiatry: appropriate mood/affect     LABS:  Urinalysis Basic - ( 31 Mar 2024 02:47 )    Color: Yellow / Appearance: Clear / S.014 / pH: x  Gluc: x / Ketone: Negative mg/dL  / Bili: Negative / Urobili: 1.0 mg/dL   Blood: x / Protein: Negative mg/dL / Nitrite: Negative   Leuk Esterase: Negative / RBC: 204 /HPF / WBC 1 /HPF   Sq Epi: x / Non Sq Epi: 0 /HPF / Bacteria: Negative /HPF                      CAPILLARY BLOOD GLUCOSE        Urinalysis Basic - ( 31 Mar 2024 02:47 )    Color: Yellow / Appearance: Clear / S.014 / pH: x  Gluc: x / Ketone: Negative mg/dL  / Bili: Negative / Urobili: 1.0 mg/dL   Blood: x / Protein: Negative mg/dL / Nitrite: Negative   Leuk Esterase: Negative / RBC: 204 /HPF / WBC 1 /HPF   Sq Epi: x / Non Sq Epi: 0 /HPF / Bacteria: Negative /HPF        MICRODATA:      RADIOLOGY/OTHER STUDIES:  < from: CT Head No Cont (24 @ 11:19) >  IMPRESSION: Acute right MCA infarct.    < end of copied text >      < from: CT Angio Neck Stroke Protocol w/ IV Cont (24 @ 08:55) >  IMPRESSION:    CT PERFUSION: Small core infarct, large ischemic penumbra in the right   MCA territory.    CTA COW: Abrupt right M1 occlusion with M2 reconstitution.    CTA NECK: Patent, ECAs, ICAs, no  hemodynamically significant stenosis at    ICA origins by NASCET criteria.  Bilateral vertebral arteries are patent without flow limiting stenosis.    --- End of Report ---    < end of copied text >      < from: CT Brain Stroke Protocol (24 @ 08:30) >  IMPRESSION:  Motion limited study.  No gross acute intracranial hemorrhage or mass effect.  Hyperdense right M1 MCA, concerning for thrombosis.      < end of copied text >  < from: Xray Chest 1 View- PORTABLE-Urgent (Xray Chest 1 View- PORTABLE-Urgent .) (24 @ 22:45) >    IMPRESSION: Bilateral lower lobe linear opacities that likely represent   atelectasis or less likely viral infection such as pelvic. The heart is   normal in size. The bones are intact.      < end of copied text >

## 2024-03-31 NOTE — PROGRESS NOTE ADULT - SUBJECTIVE AND OBJECTIVE BOX
INTERVAL HPI/OVERNIGHT EVENTS:  Patient was seen and examined at bedside. As per nurse and patient, no o/n events, patient resting comfortably. No complaints at this time. Patient denies: fever, chills, dizziness, weakness, HA, Changes in vision, CP, palpitations, SOB, cough, N/V/D/C, dysuria, changes in bowel movements, LE edema. ROS otherwise negative.    VITAL SIGNS:  T(F): 97.7 (24 @ 14:08)  HR: 70 (24 @ 15:18)  BP: 114/66 (24 @ 15:18)  RR: 19 (24 @ 13:29)  SpO2: 96% (24 @ 13:29)  Wt(kg): --    PHYSICAL EXAM:  Constitutional: sitting comfortably in chair, no acute distress  HEENT: PERRL, EOMI, sclera non-icteric, no JVD, MMM  Respiratory: CTA b/l, good air entry b/l, no wheezing, no rhonchi, no rales, without accessory muscle use and no intercostal retractions  Cardiovascular: RRR, normal S1S2, no M/R/G  Gastrointestinal: soft, NTND, no masses palpable, BS normal  Extremities: Warm, well perfused, pulses equal bilateral upper and lower extremities, no edema, no clubbing    Neurological Exam:   Mental status: Awake, alert and oriented x3. No dysarthria, no aphasia. Follows commands.  Cranial nerves: Pupils equally round and reactive to light, extraocular muscles intact, tongue was midline.  Motor:  4+/5 LUE/LLE, 5/5 in RUE/RLE.  strength 5/5. Normal tone and bulk  Sensation: Intact to light touch in all extremities.  Coordination: No dysmetria on finger-to-nose testing.  Gait: Deferred.    MEDICATIONS  (STANDING):  aspirin enteric coated 81 milliGRAM(s) Oral daily  atorvastatin 40 milliGRAM(s) Oral at bedtime  benzonatate 100 milliGRAM(s) Oral every 8 hours  enoxaparin Injectable 40 milliGRAM(s) SubCutaneous every 24 hours  finasteride 5 milliGRAM(s) Oral daily  lidocaine   4% Patch 1 Patch Transdermal daily  lidocaine   4% Patch 1 Patch Transdermal every 24 hours  melatonin 3 milliGRAM(s) Oral at bedtime  polyethylene glycol 3350 17 Gram(s) Oral daily  senna 2 Tablet(s) Oral two times a day  sodium chloride 0.9%. 1000 milliLiter(s) (75 mL/Hr) IV Continuous <Continuous>  tamsulosin 0.4 milliGRAM(s) Oral at bedtime  topiramate 50 milliGRAM(s) Oral every 24 hours    MEDICATIONS  (PRN):  acetaminophen     Tablet .. 650 milliGRAM(s) Oral every 6 hours PRN Temp greater or equal to 38C (100.4F), Mild Pain (1 - 3)  benzocaine/menthol Lozenge 1 Lozenge Oral three times a day PRN Sore Throat  ondansetron Injectable 4 milliGRAM(s) IV Push every 8 hours PRN Nausea and/or Vomiting    Allergies    Allergy Status Unknown    Intolerances    LABS:                        13.6   8.19  )-----------( 198      ( 31 Mar 2024 14:00 )             39.5     Urinalysis Basic - ( 31 Mar 2024 10:53 )    Color: Dark Yellow / Appearance: Clear / S.023 / pH: x  Gluc: x / Ketone: Trace mg/dL  / Bili: Negative / Urobili: 1.0 mg/dL   Blood: x / Protein: Negative mg/dL / Nitrite: Negative   Leuk Esterase: Negative / RBC: x / WBC x   Sq Epi: x / Non Sq Epi: x / Bacteria: x    RADIOLOGY & ADDITIONAL TESTS:  Reviewed

## 2024-03-31 NOTE — PROGRESS NOTE ADULT - PROBLEM SELECTOR PLAN 5
cont. Flomax + Proscar. cont. Flomax + Proscar.    # Hematuria  - repeat Urinalysis today  - if persistent, consult urology

## 2024-03-31 NOTE — PROGRESS NOTE ADULT - PROBLEM SELECTOR PLAN 2
Started on Remdesivir. Currently not in any respiratory distress.   - monitor O2  -  if hypoxia will start Dexamethasone to 6 mg daily.   - Monitor LFTs and creatinine while on Remdesivir.   - A/C per primary team    # Constipation  On bowel regimen.   Ensure having BMs. Otherwise increase bowel regimen. Can give one time dulcolax. Started on Remdesivir. Currently not in any respiratory distress.   - monitor O2  -  if hypoxia will start Dexamethasone to 6 mg daily.   - Monitor LFTs and creatinine while on Remdesivir.   - A/C per primary team    # Constipation  On bowel regimen. Still no BM.  - give dulcolax today x 2  - if no bm, give magnesium oxide

## 2024-03-31 NOTE — PROGRESS NOTE ADULT - ASSESSMENT
72 y/o M w/PMHX of HTN, BPH, HLD, mitral valve repair present to ED with left sided weakness, left facial droop, and left gaze preference, s/p TNK (3/24/24). Initial NIHSS 17 after TNK was administered, the score was reduced to 15. Stroke code imaging demonstrates right M1 occlusion, s/p thrombectomy Right M1/2, TICI 0 to 2B (3/24/24). 24-hour CTH post-TNK/thrombectomy demonstrated acute Right MCA infarct, consistent with initial CTH and no acute hemorrhage seen. NIHSS on 3/25/24 improved to 3.

## 2024-04-01 LAB — SARS-COV-2 RNA SPEC QL NAA+PROBE: DETECTED

## 2024-04-01 PROCEDURE — 99232 SBSQ HOSP IP/OBS MODERATE 35: CPT

## 2024-04-01 PROCEDURE — 99233 SBSQ HOSP IP/OBS HIGH 50: CPT

## 2024-04-01 RX ORDER — CLOPIDOGREL BISULFATE 75 MG/1
75 TABLET, FILM COATED ORAL DAILY
Refills: 0 | Status: DISCONTINUED | OUTPATIENT
Start: 2024-04-01 | End: 2024-04-05

## 2024-04-01 RX ADMIN — ATORVASTATIN CALCIUM 40 MILLIGRAM(S): 80 TABLET, FILM COATED ORAL at 21:31

## 2024-04-01 RX ADMIN — Medication 50 MILLIGRAM(S): at 21:31

## 2024-04-01 RX ADMIN — Medication 100 MILLIGRAM(S): at 12:00

## 2024-04-01 RX ADMIN — Medication 100 MILLIGRAM(S): at 19:26

## 2024-04-01 RX ADMIN — LIDOCAINE 1 PATCH: 4 CREAM TOPICAL at 19:29

## 2024-04-01 RX ADMIN — Medication 100 MILLIGRAM(S): at 03:10

## 2024-04-01 RX ADMIN — FINASTERIDE 5 MILLIGRAM(S): 5 TABLET, FILM COATED ORAL at 12:00

## 2024-04-01 RX ADMIN — TAMSULOSIN HYDROCHLORIDE 0.4 MILLIGRAM(S): 0.4 CAPSULE ORAL at 21:30

## 2024-04-01 RX ADMIN — LIDOCAINE 1 PATCH: 4 CREAM TOPICAL at 11:58

## 2024-04-01 RX ADMIN — Medication 3 MILLIGRAM(S): at 21:30

## 2024-04-01 RX ADMIN — POLYETHYLENE GLYCOL 3350 17 GRAM(S): 17 POWDER, FOR SOLUTION ORAL at 12:01

## 2024-04-01 RX ADMIN — SENNA PLUS 2 TABLET(S): 8.6 TABLET ORAL at 06:33

## 2024-04-01 RX ADMIN — ENOXAPARIN SODIUM 40 MILLIGRAM(S): 100 INJECTION SUBCUTANEOUS at 16:09

## 2024-04-01 RX ADMIN — CLOPIDOGREL BISULFATE 75 MILLIGRAM(S): 75 TABLET, FILM COATED ORAL at 14:12

## 2024-04-01 RX ADMIN — SENNA PLUS 2 TABLET(S): 8.6 TABLET ORAL at 19:24

## 2024-04-01 RX ADMIN — Medication 81 MILLIGRAM(S): at 12:00

## 2024-04-01 NOTE — PROGRESS NOTE ADULT - SUBJECTIVE AND OBJECTIVE BOX
INTERVAL HPI/OVERNIGHT EVENTS:  Patient seen and examined. He was A&Ox3, cooperative throughout the examination and engaged in knowing the next steps of his treatment plan.     MEDICATIONS  (STANDING):  aspirin enteric coated 81 milliGRAM(s) Oral daily  atorvastatin 40 milliGRAM(s) Oral at bedtime  benzonatate 100 milliGRAM(s) Oral every 8 hours  clopidogrel Tablet 75 milliGRAM(s) Oral daily  enoxaparin Injectable 40 milliGRAM(s) SubCutaneous every 24 hours  finasteride 5 milliGRAM(s) Oral daily  lidocaine   4% Patch 1 Patch Transdermal daily  lidocaine   4% Patch 1 Patch Transdermal every 24 hours  melatonin 3 milliGRAM(s) Oral at bedtime  polyethylene glycol 3350 17 Gram(s) Oral daily  senna 2 Tablet(s) Oral at bedtime  sodium chloride 0.9%. 1000 milliLiter(s) (100 mL/Hr) IV Continuous <Continuous>  tamsulosin 0.4 milliGRAM(s) Oral at bedtime  topiramate 50 milliGRAM(s) Oral every 24 hours    MEDICATIONS  (PRN):  acetaminophen     Tablet .. 650 milliGRAM(s) Oral every 6 hours PRN Temp greater or equal to 38C (100.4F), Mild Pain (1 - 3)  benzocaine/menthol Lozenge 1 Lozenge Oral three times a day PRN Sore Throat  ondansetron Injectable 4 milliGRAM(s) IV Push every 8 hours PRN Nausea and/or Vomiting      Allergies    Allergy Status Unknown    Intolerances        Vital Signs Last 24 Hrs  T(C): 35.9 (29 Mar 2024 05:25), Max: 36.6 (28 Mar 2024 09:53)  T(F): 96.7 (29 Mar 2024 05:25), Max: 97.8 (28 Mar 2024 09:53)  HR: 63 (29 Mar 2024 08:24) (53 - 74)  BP: 131/85 (29 Mar 2024 08:24) (114/78 - 144/87)  BP(mean): 103 (29 Mar 2024 08:24) (92 - 111)  RR: 20 (29 Mar 2024 08:24) (15 - 20)  SpO2: 94% (29 Mar 2024 08:24) (93% - 98%)    Parameters below as of 29 Mar 2024 08:24  Patient On (Oxygen Delivery Method): room air        Neurologic:  -Mental status: Awake, opens eyes and follows commands easily to voice, oriented to person, place, and time. Speech is fluent with intact naming, repetition, and comprehension, No dysarthria. Recent and remote memory intact. Follows commands.    -Cranial nerves:   III, IV, VI: Extraocular movements are intact without nystagmus. Pupils equally round and reactive to light  V:  Facial sensation V1-V3 equal and intact   VII: Mild left nasolabial fold flattening.   Motor: Normal bulk and tone. Left upper extremity drift with no pronation observed. Left upper extremity 4/5. Left lower extremity, Right upper and lower extremity 5/5.   Sensation: Intact to light touch bilaterally. No neglect or extinction on double simultaneous testing.  Coordination: No dysmetria on finger-to-nose bilaterally    LABS:                        13.8   8.40  )-----------( 181      ( 29 Mar 2024 05:30 )             40.0     03-29    139  |  107  |  13  ----------------------------<  113<H>  3.7   |  24  |  0.70    Ca    9.5      29 Mar 2024 05:30  Phos  2.9     03-29  Mg     2.0     03-29        Urinalysis Basic - ( 29 Mar 2024 05:30 )    Color: x / Appearance: x / SG: x / pH: x  Gluc: 113 mg/dL / Ketone: x  / Bili: x / Urobili: x   Blood: x / Protein: x / Nitrite: x   Leuk Esterase: x / RBC: x / WBC x   Sq Epi: x / Non Sq Epi: x / Bacteria: x        RADIOLOGY & ADDITIONAL TESTS:  Reviewed.      INTERVAL HPI/OVERNIGHT EVENTS:  Patient seen and examined. He was A&Ox3, cooperative throughout the examination and engaged in knowing the next steps of his treatment plan.     MEDICATIONS  (STANDING):  aspirin enteric coated 81 milliGRAM(s) Oral daily  atorvastatin 40 milliGRAM(s) Oral at bedtime  benzonatate 100 milliGRAM(s) Oral every 8 hours  clopidogrel Tablet 75 milliGRAM(s) Oral daily  enoxaparin Injectable 40 milliGRAM(s) SubCutaneous every 24 hours  finasteride 5 milliGRAM(s) Oral daily  lidocaine   4% Patch 1 Patch Transdermal daily  lidocaine   4% Patch 1 Patch Transdermal every 24 hours  melatonin 3 milliGRAM(s) Oral at bedtime  polyethylene glycol 3350 17 Gram(s) Oral daily  senna 2 Tablet(s) Oral at bedtime  sodium chloride 0.9%. 1000 milliLiter(s) (100 mL/Hr) IV Continuous <Continuous>  tamsulosin 0.4 milliGRAM(s) Oral at bedtime  topiramate 50 milliGRAM(s) Oral every 24 hours    MEDICATIONS  (PRN):  acetaminophen     Tablet .. 650 milliGRAM(s) Oral every 6 hours PRN Temp greater or equal to 38C (100.4F), Mild Pain (1 - 3)  benzocaine/menthol Lozenge 1 Lozenge Oral three times a day PRN Sore Throat  ondansetron Injectable 4 milliGRAM(s) IV Push every 8 hours PRN Nausea and/or Vomiting      Allergies    Allergy Status Unknown    Intolerances        Vital Signs Last 24 Hrs  T(C): 35.9 (29 Mar 2024 05:25), Max: 36.6 (28 Mar 2024 09:53)  T(F): 96.7 (29 Mar 2024 05:25), Max: 97.8 (28 Mar 2024 09:53)  HR: 63 (29 Mar 2024 08:24) (53 - 74)  BP: 131/85 (29 Mar 2024 08:24) (114/78 - 144/87)  BP(mean): 103 (29 Mar 2024 08:24) (92 - 111)  RR: 20 (29 Mar 2024 08:24) (15 - 20)  SpO2: 94% (29 Mar 2024 08:24) (93% - 98%)    Parameters below as of 29 Mar 2024 08:24  Patient On (Oxygen Delivery Method): room air        Neurologic:  -Mental status: Awake, opens eyes and follows commands easily to voice, oriented to person, place, and time. Speech is fluent with intact naming, repetition, and comprehension, No dysarthria. Recent and remote memory intact. Follows commands.    -Cranial nerves:   III, IV, VI: Extraocular movements are intact without nystagmus. Pupils equally round and reactive to light  V:  Facial sensation V1-V3 equal and intact   VII: Mild left nasolabial fold flattening.   Motor: Normal bulk and tone. Left upper extremity drift with no pronation observed. Left upper extremity 4/5, Left lower extremity 4+/5. Right upper and lower extremity 5/5.   Sensation: Intact to light touch bilaterally. No neglect or extinction on double simultaneous testing.  Coordination: No dysmetria on finger-to-nose bilaterally    LABS:                        13.8   8.40  )-----------( 181      ( 29 Mar 2024 05:30 )             40.0     03-29    139  |  107  |  13  ----------------------------<  113<H>  3.7   |  24  |  0.70    Ca    9.5      29 Mar 2024 05:30  Phos  2.9     03-29  Mg     2.0     03-29        Urinalysis Basic - ( 29 Mar 2024 05:30 )    Color: x / Appearance: x / SG: x / pH: x  Gluc: 113 mg/dL / Ketone: x  / Bili: x / Urobili: x   Blood: x / Protein: x / Nitrite: x   Leuk Esterase: x / RBC: x / WBC x   Sq Epi: x / Non Sq Epi: x / Bacteria: x        RADIOLOGY & ADDITIONAL TESTS:  Reviewed.   COVID swab 4/1: positive

## 2024-04-01 NOTE — PROGRESS NOTE ADULT - ASSESSMENT
72 y/o M w/PMHX of HTN, BPH, HLD, mitral valve repair present to ED with left sided weakness, left facial droop, and left gaze preference, s/p TNK (3/24/24). Initial NIHSS 17 after TNK was administered, the score was reduced to 15. Stroke code imaging demonstrates right M1 occlusion, now s/p thrombectomy Right M1/2, TICI 0 to 2B (3/24/24). 24-hour CTH post-TNK/thrombectomy demonstrated acute Right MCA infarct, consistent with initial CTH and no acute hemorrhage seen. NIHSS on 3/25/24 improved to 3. TTE/DEBBIE unremarkable. COVID pos 3/26. Pending AR, ILR placement and repeat COVID swab 4/1.    Neuro  #Right MCA infarct status post TNK and thrombectomy with Initial NIHSS 17 improved to 3.   - continue on aspirin 81mg and plavix 75mg daily  - continue atorvastatin 40mg daily  - q4hr stroke neuro checks and vitals  - Stroke Code HCT Results: No gross acute intracranial hemorrhage or mass effect. Hyperdense right M1 MCA, concerning for thrombosis.  CTA COW: Abrupt right M1 occlusion with M2 reconstitution.  CTA NECK: Patent, ECAs, ICAs, no  hemodynamically significant stenosis at ICA origins by NASCET criteria. Bilateral vertebral arteries are patent without flow limiting stenosis.  - no need for MRI as stroke is seen on CTH  - Stroke education  - EP c/s for ILR placement - pending. Since pt is COVID(+), ILR placement is on hold for now.     #Headache  - Decadron 1 mg every 6 hours - d/c as headache is resolved  - Topiramate increased from 25mg to 50 mg once daily on 3/26   - d/c tramadol 2/2 sedation  - can also receive PRN IV tylenol for pain    Cards  #HTN  - permissive hypertension, Goal -180  - hold home blood pressure medication for now  - Echo w/bubble: Aortic sclerosis without significant stenosis.  An annuloplasty ring is noted in the mitral position. Mean   transvalvular gradient is 2.30 mmHg at a heart rate of 67 bpm.  < from: DEBBIE w/Doppler (03.26.24 @ 14:01) >   1. Normal left and right ventricular size and systolic function.   2. No LA/RA/DANIEL/RAA thrombus seen.   3. No evidence of an intracardiac shunt.   4. Mild aortic regurgitation.   5. An annuloplasty ring is noted in the mitral position. The mean   transvalvular gradient is 2.00 mmHg at a heart rate of 68 bpm. There is   trace mitral regurgitation.   6. No echo evidence of pulmonary HTN.   7. No pericardial effusion.    #HLD  - high dose statin as above in CVA  - LDL results: 59 mg/dL    Pulm  - call provider if SPO2 < 94%    GI  #Nutrition/Fluids/Electrolytes   - replete K<4 and Mg <2  - Diet: Soft and Bite-sized   - IVF: s/p NS 500cc bolus x 1 as SBP dropped to low 100s after standing the patient up, started maintenance fluids    Renal  - monitor and trend Cr    Infectious Disease  - CXR with Bilateral lower lobe linear opacities that likely represent atelectasis or less likely viral infection.    #COVID   - Remdesevir x 3 days - completed tx.   - COVID positive 3/26 - pending 10 day isolation for rehab  - COVID reswab 3/29 - positive  - COVID Reswab 4/1 - pending     Endocrine  - A1C results: 5.5    - TSH results: 0.603      Urology  #BPH  - c/w finasteride and flomax    DVT Prophylaxis  - lovenox sq for DVT prophylaxis   - SCDs for DVT prophylaxis       IDR Goals: Goals reviewed at interdisciplinary rounds with case management, social work, physical therapy, occupational therapy, and speech language pathology.   Please see specific therapy  notes for in depth goals.  Dispo: AR - can tolerate 3 hours of therapy, can only go to rehab once off isolation      Discussed daily hospital plans and goals with patient.   Discussed with Neurology Attending Dr. Dinh.  74 y/o M w/PMHX of HTN, BPH, HLD, mitral valve repair present to ED with left sided weakness, left facial droop, and left gaze preference, s/p TNK (3/24/24). Initial NIHSS 17 after TNK was administered, the score was reduced to 15. Stroke code imaging demonstrates right M1 occlusion, now s/p thrombectomy Right M1/2, TICI 0 to 2B (3/24/24). 24-hour CTH post-TNK/thrombectomy demonstrated acute Right MCA infarct, consistent with initial CTH and no acute hemorrhage seen. NIHSS on 3/25/24 improved to 3. TTE/DEBBIE unremarkable. COVID pos 3/26. Pending AR and ILR placement.    Neuro  #Right MCA infarct status post TNK and thrombectomy with Initial NIHSS 17 improved to 3.   - continue on aspirin 81mg and plavix 75mg daily  - continue atorvastatin 40mg daily  - q4hr stroke neuro checks and vitals  - Stroke Code HCT Results: No gross acute intracranial hemorrhage or mass effect. Hyperdense right M1 MCA, concerning for thrombosis.  CTA COW: Abrupt right M1 occlusion with M2 reconstitution.  CTA NECK: Patent, ECAs, ICAs, no  hemodynamically significant stenosis at ICA origins by NASCET criteria. Bilateral vertebral arteries are patent without flow limiting stenosis.  - no need for MRI as stroke is seen on CTH  - Stroke education  - EP c/s for ILR placement - pending. Since pt is COVID(+), ILR placement is on hold for now.     #Headache  - Decadron 1 mg every 6 hours - d/c as headache is resolved  - Topiramate increased from 25mg to 50 mg once daily on 3/26   - d/c tramadol 2/2 sedation  - can also receive PRN IV tylenol for pain    Cards  #HTN  - permissive hypertension, Goal -180  - hold home blood pressure medication for now  - Echo w/bubble: Aortic sclerosis without significant stenosis.  An annuloplasty ring is noted in the mitral position. Mean   transvalvular gradient is 2.30 mmHg at a heart rate of 67 bpm.  < from: DEBBIE w/Doppler (03.26.24 @ 14:01) >   1. Normal left and right ventricular size and systolic function.   2. No LA/RA/DANIEL/RAA thrombus seen.   3. No evidence of an intracardiac shunt.   4. Mild aortic regurgitation.   5. An annuloplasty ring is noted in the mitral position. The mean   transvalvular gradient is 2.00 mmHg at a heart rate of 68 bpm. There is   trace mitral regurgitation.   6. No echo evidence of pulmonary HTN.   7. No pericardial effusion.    #HLD  - high dose statin as above in CVA  - LDL results: 59 mg/dL    Pulm  - call provider if SPO2 < 94%    GI  #Nutrition/Fluids/Electrolytes   - replete K<4 and Mg <2  - Diet: Soft and Bite-sized   - IVF: s/p NS 500cc bolus x 1 as SBP dropped to low 100s after standing the patient up, started maintenance fluids    Renal  - monitor and trend Cr    Infectious Disease  - CXR with Bilateral lower lobe linear opacities that likely represent atelectasis or less likely viral infection.    #COVID   - Remdesevir x 3 days - completed tx.   - COVID positive 3/26 - pending 10 day isolation for rehab  - COVID reswab 3/29 - positive  - COVID Reswab 4/1 - positive  - Will continue to perform Daily COVID swabs for the following week.      Endocrine  - A1C results: 5.5    - TSH results: 0.603      Urology  #BPH  - c/w finasteride and flomax    DVT Prophylaxis  - lovenox sq for DVT prophylaxis   - SCDs for DVT prophylaxis       IDR Goals: Goals reviewed at interdisciplinary rounds with case management, social work, physical therapy, occupational therapy, and speech language pathology.   Please see specific therapy  notes for in depth goals.  Dispo: AR - can tolerate 3 hours of therapy, can only go to rehab once off isolation      Discussed daily hospital plans and goals with patient.   Discussed with Neurology Attending Dr. Dinh.  74 y/o M w/PMHX of HTN, BPH, HLD, mitral valve repair present to ED with left sided weakness, left facial droop, and left gaze preference, s/p TNK (3/24/24). Initial NIHSS 17 after TNK was administered, the score was reduced to 15. Stroke code imaging demonstrates right M1 occlusion, now s/p thrombectomy Right M1/2, TICI 0 to 2B (3/24/24). 24-hour CTH post-TNK/thrombectomy demonstrated acute Right MCA infarct, consistent with initial CTH and no acute hemorrhage seen. NIHSS on 3/25/24 improved to 3. TTE/DEBBIE unremarkable. COVID pos 3/26. Pending AR and ILR placement.    Neuro  #Right MCA infarct status post TNK and thrombectomy with Initial NIHSS 17 improved to 3.   - continue on aspirin 81mg and plavix 75mg daily (Plavix restarted on 4/1/2024 since Hematuria has resolved, discussed with Dr. Dinh and Dr. Young)   - continue atorvastatin 40mg daily  - q4hr stroke neuro checks and vitals  - Stroke Code HCT Results: No gross acute intracranial hemorrhage or mass effect. Hyperdense right M1 MCA, concerning for thrombosis.  CTA COW: Abrupt right M1 occlusion with M2 reconstitution.  CTA NECK: Patent, ECAs, ICAs, no  hemodynamically significant stenosis at ICA origins by NASCET criteria. Bilateral vertebral arteries are patent without flow limiting stenosis.  - no need for MRI as stroke is seen on CTH  - Stroke education  - EP c/s for ILR placement - pending. Since pt is COVID(+), ILR placement is on hold for now.     #Headache  - Decadron 1 mg every 6 hours - d/c as headache is resolved  - Topiramate increased from 25mg to 50 mg once daily on 3/26   - d/c tramadol 2/2 sedation  - can also receive PRN IV tylenol for pain    Cards  #HTN  - permissive hypertension, Goal -180  - hold home blood pressure medication for now  - Echo w/bubble: Aortic sclerosis without significant stenosis.  An annuloplasty ring is noted in the mitral position. Mean   transvalvular gradient is 2.30 mmHg at a heart rate of 67 bpm.  < from: DEBBIE w/Doppler (03.26.24 @ 14:01) >   1. Normal left and right ventricular size and systolic function.   2. No LA/RA/DANIEL/RAA thrombus seen.   3. No evidence of an intracardiac shunt.   4. Mild aortic regurgitation.   5. An annuloplasty ring is noted in the mitral position. The mean   transvalvular gradient is 2.00 mmHg at a heart rate of 68 bpm. There is   trace mitral regurgitation.   6. No echo evidence of pulmonary HTN.   7. No pericardial effusion.    #HLD  - high dose statin as above in CVA  - LDL results: 59 mg/dL    Pulm  - call provider if SPO2 < 94%    GI  #Nutrition/Fluids/Electrolytes   - replete K<4 and Mg <2  - Diet: Soft and Bite-sized   - IVF: s/p NS 500cc bolus x 1 as SBP dropped to low 100s after standing the patient up, started maintenance fluids    Renal  - monitor and trend Cr    Infectious Disease  - CXR with Bilateral lower lobe linear opacities that likely represent atelectasis or less likely viral infection.    #COVID   - Remdesevir x 3 days - completed tx.   - COVID positive 3/26 - pending 10 day isolation for rehab  - COVID reswab 3/29 - positive  - COVID Reswab 4/1 - positive  - Will continue to perform Daily COVID swabs for the following week.      Endocrine  - A1C results: 5.5    - TSH results: 0.603      Urology  #BPH  - c/w finasteride and flomax    #Hematuria   - Plavix d/c on 3/31 since Hematuria occurred  - Resolved and restarted on Plavix on 4/1, discussed with Dr. Dinh and Dr. Young   - If hematuria recurs, obtain CT urogram and urology consult  - Outpatient Urology upon discharge recommended by hospitalist Dr. Young     DVT Prophylaxis  - lovenox sq for DVT prophylaxis   - SCDs for DVT prophylaxis       IDR Goals: Goals reviewed at interdisciplinary rounds with case management, social work, physical therapy, occupational therapy, and speech language pathology.   Please see specific therapy  notes for in depth goals.  Dispo: AR - can tolerate 3 hours of therapy, can only go to rehab once off isolation      Discussed daily hospital plans and goals with patient.   Discussed with Neurology Attending Dr. Dinh.

## 2024-04-01 NOTE — PROGRESS NOTE ADULT - SUBJECTIVE AND OBJECTIVE BOX
S: patient seen bedside. no acute complaints. Hematuria essentially resolved. No pain.       Vital Signs Last 24 Hrs  T(C): 36.3 (01 Apr 2024 13:09), Max: 36.6 (01 Apr 2024 00:51)  T(F): 97.3 (01 Apr 2024 13:09), Max: 97.9 (01 Apr 2024 00:51)  HR: 83 (01 Apr 2024 12:00) (67 - 84)  BP: 113/78 (01 Apr 2024 12:00) (95/63 - 116/77)  BP(mean): 89 (01 Apr 2024 12:00) (74 - 92)  RR: 18 (01 Apr 2024 12:00) (16 - 19)  SpO2: 97% (01 Apr 2024 12:00) (95% - 100%)    Parameters below as of 01 Apr 2024 12:00  Patient On (Oxygen Delivery Method): room air        PHYSICAL EXAM:  General: NAD  HEENT: NC/AT; PERRL, clear conjunctiva  Neck: supple  Respiratory: CTA b/l  Cardiovascular: +S1/S2; RRR  Abdomen: soft, NT/ND; +BS x4  Extremities: 2+ peripheral pulses b/l; no LE edema  Skin: normal color and turgor; no rash  Neurological: AAO x 3. no FND.  Psychiatry: appropriate mood/affect                           13.6   8.19  )-----------( 198      ( 31 Mar 2024 14:00 )             39.5

## 2024-04-01 NOTE — PROGRESS NOTE ADULT - PROBLEM SELECTOR PLAN 2
Started on Remdesivir. Currently not in any respiratory distress.   - monitor O2  -  if hypoxia will start Dexamethasone to 6 mg daily.   - Monitor LFTs and creatinine while on Remdesivir.   - A/C per primary team    # Constipation  On bowel regimen. Still no BM.  - give dulcolax today x 2  - if no bm, give magnesium oxide

## 2024-04-01 NOTE — PROGRESS NOTE ADULT - PROBLEM SELECTOR PLAN 5
cont. Flomax + Proscar.    # Hematuria  - Cleared. NO reports of passing clots. He should follow up with a urologist outpatient. Plavix has been held for a few days but cleared up very quickly. Would resume. Again if hematuria persists then would get uro follow up while admitted.

## 2024-04-02 LAB
ANION GAP SERPL CALC-SCNC: 13 MMOL/L — SIGNIFICANT CHANGE UP (ref 5–17)
BUN SERPL-MCNC: 13 MG/DL — SIGNIFICANT CHANGE UP (ref 7–23)
CALCIUM SERPL-MCNC: 9 MG/DL — SIGNIFICANT CHANGE UP (ref 8.4–10.5)
CHLORIDE SERPL-SCNC: 108 MMOL/L — SIGNIFICANT CHANGE UP (ref 96–108)
CO2 SERPL-SCNC: 17 MMOL/L — LOW (ref 22–31)
CREAT SERPL-MCNC: 0.68 MG/DL — SIGNIFICANT CHANGE UP (ref 0.5–1.3)
EGFR: 99 ML/MIN/1.73M2 — SIGNIFICANT CHANGE UP
GLUCOSE SERPL-MCNC: 96 MG/DL — SIGNIFICANT CHANGE UP (ref 70–99)
HCT VFR BLD CALC: 39.2 % — SIGNIFICANT CHANGE UP (ref 39–50)
HGB BLD-MCNC: 13.5 G/DL — SIGNIFICANT CHANGE UP (ref 13–17)
MAGNESIUM SERPL-MCNC: 2.1 MG/DL — SIGNIFICANT CHANGE UP (ref 1.6–2.6)
MCHC RBC-ENTMCNC: 32.6 PG — SIGNIFICANT CHANGE UP (ref 27–34)
MCHC RBC-ENTMCNC: 34.4 GM/DL — SIGNIFICANT CHANGE UP (ref 32–36)
MCV RBC AUTO: 94.7 FL — SIGNIFICANT CHANGE UP (ref 80–100)
NRBC # BLD: 0 /100 WBCS — SIGNIFICANT CHANGE UP (ref 0–0)
PHOSPHATE SERPL-MCNC: 3.5 MG/DL — SIGNIFICANT CHANGE UP (ref 2.5–4.5)
PLATELET # BLD AUTO: 223 K/UL — SIGNIFICANT CHANGE UP (ref 150–400)
POTASSIUM SERPL-MCNC: 3.8 MMOL/L — SIGNIFICANT CHANGE UP (ref 3.5–5.3)
POTASSIUM SERPL-SCNC: 3.8 MMOL/L — SIGNIFICANT CHANGE UP (ref 3.5–5.3)
RBC # BLD: 4.14 M/UL — LOW (ref 4.2–5.8)
RBC # FLD: 12.1 % — SIGNIFICANT CHANGE UP (ref 10.3–14.5)
SARS-COV-2 RNA SPEC QL NAA+PROBE: DETECTED
SODIUM SERPL-SCNC: 138 MMOL/L — SIGNIFICANT CHANGE UP (ref 135–145)
WBC # BLD: 7.92 K/UL — SIGNIFICANT CHANGE UP (ref 3.8–10.5)
WBC # FLD AUTO: 7.92 K/UL — SIGNIFICANT CHANGE UP (ref 3.8–10.5)

## 2024-04-02 PROCEDURE — 99232 SBSQ HOSP IP/OBS MODERATE 35: CPT

## 2024-04-02 RX ADMIN — ENOXAPARIN SODIUM 40 MILLIGRAM(S): 100 INJECTION SUBCUTANEOUS at 16:17

## 2024-04-02 RX ADMIN — Medication 81 MILLIGRAM(S): at 11:47

## 2024-04-02 RX ADMIN — SENNA PLUS 2 TABLET(S): 8.6 TABLET ORAL at 05:03

## 2024-04-02 RX ADMIN — Medication 50 MILLIGRAM(S): at 21:03

## 2024-04-02 RX ADMIN — ATORVASTATIN CALCIUM 40 MILLIGRAM(S): 80 TABLET, FILM COATED ORAL at 21:03

## 2024-04-02 RX ADMIN — FINASTERIDE 5 MILLIGRAM(S): 5 TABLET, FILM COATED ORAL at 11:55

## 2024-04-02 RX ADMIN — CLOPIDOGREL BISULFATE 75 MILLIGRAM(S): 75 TABLET, FILM COATED ORAL at 11:46

## 2024-04-02 RX ADMIN — Medication 100 MILLIGRAM(S): at 11:46

## 2024-04-02 RX ADMIN — Medication 100 MILLIGRAM(S): at 19:14

## 2024-04-02 RX ADMIN — SENNA PLUS 2 TABLET(S): 8.6 TABLET ORAL at 19:14

## 2024-04-02 RX ADMIN — Medication 100 MILLIGRAM(S): at 04:42

## 2024-04-02 RX ADMIN — TAMSULOSIN HYDROCHLORIDE 0.4 MILLIGRAM(S): 0.4 CAPSULE ORAL at 21:02

## 2024-04-02 RX ADMIN — Medication 3 MILLIGRAM(S): at 21:02

## 2024-04-02 NOTE — PROGRESS NOTE ADULT - PROBLEM SELECTOR PLAN 2
Finished Remdesivir. Currently not in any respiratory distress.   - monitor O2  -  if hypoxia will start Dexamethasone to 6 mg daily.   - A/C per primary team    # Constipation  On bowel regimen. Still no BM.  - give dulcolax today x 2  - if no bm, give magnesium oxide

## 2024-04-02 NOTE — PROGRESS NOTE ADULT - NS ATTEND AMEND GEN_ALL_CORE FT
73 year old man w/ PMH of HTN, HLD, BPH, mitral valve repair presented w/ R. MCA syndrome secondary to R. M1 occlusion s/p TNK and MT w/ TICI 2B reperfusion. Found to be covid positive.     On exam, fluent, fc, vff, eomi, mild L. facial droop, LUE drift, ?LLE drift.     CTH demonstrates small R. MCA infarction  DEBBIE neg  A1c 5.5  LDL 59   COVID positive     Imp: R. MCA infarction s/p ESUS +/- hypercoag of COVID.     Plan:   Continue ASA 81mg + Plavix 75mg for 3w followed by ASA 81 indefinitely (start plavix if cleared by medicine/urology team)   Continue statin  Normotension   ILR prior to discharge  PT - AR     40 minutes spent on total encounter. The necessity of the time spent during the encounter on this date of service was due to:     Review of imaging and chart; obtaining history; examination of pt; discussion and coordination of care, and discussion of lifestyle modification and risk factor control.
73 year old man w/ PMH of HTN, HLD, BPH, mitral valve repair presented w/ R. MCA syndrome secondary to R. M1 occlusion s/p TNK and MT w/ TICI 2B reperfusion. Found to be covid positive.     On exam, fluent, fc, vff, eomi, mild L. facial droop, LUE drift, ?LLE drift.     CTH demonstrates small R. MCA infarction  DEBBIE neg  A1c 5.5  LDL 59   COVID positive     Imp: R. MCA infarction s/p ESUS +/- hypercoag of COVID.     Plan:   Continue ASA 81mg + Plavix 75mg for 3w followed by ASA 81 indefinitely (start plavix if cleared by medicine/urology team)   Continue statin  Normotension   ILR prior to discharge  PT - AR     50 minutes spent on total encounter. The necessity of the time spent during the encounter on this date of service was due to:     Review of imaging and chart; obtaining history; examination of pt; discussion and coordination of care, and discussion of lifestyle modification and risk factor control.

## 2024-04-02 NOTE — PROGRESS NOTE ADULT - SUBJECTIVE AND OBJECTIVE BOX
Neurology Stroke Progress Note    INTERVAL HPI/OVERNIGHT EVENTS:  Patient seen and examined. Denies any acute overnight events.    MEDICATIONS  (STANDING):  aspirin enteric coated 81 milliGRAM(s) Oral daily  atorvastatin 40 milliGRAM(s) Oral at bedtime  benzonatate 100 milliGRAM(s) Oral every 8 hours  clopidogrel Tablet 75 milliGRAM(s) Oral daily  enoxaparin Injectable 40 milliGRAM(s) SubCutaneous every 24 hours  finasteride 5 milliGRAM(s) Oral daily  lidocaine   4% Patch 1 Patch Transdermal daily  lidocaine   4% Patch 1 Patch Transdermal every 24 hours  melatonin 3 milliGRAM(s) Oral at bedtime  polyethylene glycol 3350 17 Gram(s) Oral daily  senna 2 Tablet(s) Oral two times a day  sodium chloride 0.9%. 1000 milliLiter(s) (75 mL/Hr) IV Continuous <Continuous>  tamsulosin 0.4 milliGRAM(s) Oral at bedtime  topiramate 50 milliGRAM(s) Oral every 24 hours    MEDICATIONS  (PRN):  acetaminophen     Tablet .. 650 milliGRAM(s) Oral every 6 hours PRN Temp greater or equal to 38C (100.4F), Mild Pain (1 - 3)  benzocaine/menthol Lozenge 1 Lozenge Oral three times a day PRN Sore Throat  bisacodyl Suppository 10 milliGRAM(s) Rectal daily PRN Constipation  ondansetron Injectable 4 milliGRAM(s) IV Push every 8 hours PRN Nausea and/or Vomiting      Allergies    Allergy Status Unknown    Intolerances        Vital Signs Last 24 Hrs  T(C): 36.6 (02 Apr 2024 09:42), Max: 37 (01 Apr 2024 16:44)  T(F): 97.8 (02 Apr 2024 09:42), Max: 98.6 (01 Apr 2024 16:44)  HR: 63 (02 Apr 2024 08:17) (63 - 83)  BP: 111/77 (02 Apr 2024 08:17) (100/69 - 116/67)  BP(mean): 88 (02 Apr 2024 08:17) (80 - 91)  RR: 16 (02 Apr 2024 08:17) (16 - 18)  SpO2: 99% (02 Apr 2024 08:17) (97% - 99%)    Parameters below as of 02 Apr 2024 08:17  Patient On (Oxygen Delivery Method): room air        Physical exam:  General: No acute distress, awake and alert  Eyes: Anicteric sclerae, moist conjunctivae, see below for CNs  Neck: trachea midline, FROM  Cardiovascular: Regular rate and rhythm  Pulmonary: No use of accessory muscles    Neurologic:  -Mental status: Awake, alert, oriented to person, place, and time. Speech is fluent with intact naming, repetition, and comprehension, no dysarthria. Recent and remote memory intact. Follows commands. Attention/concentration intact. Fund of knowledge appropriate.  -Cranial nerves:   II: Visual fields are full to confrontation.  III, IV, VI: Extraocular movements are intact without nystagmus. Pupils equally round and reactive to light  V:  Facial sensation V1-V3 equal and intact   VII: Mild left nasolabial fold flattening  XI: Head turning intact.  Motor: Normal bulk and tone. LUE drift with no pronator drift. Right upper and lower extremities 5/5. Left upper and lower extremities 4/5.  Sensation: Intact to light touch bilaterally. No neglect or extinction on double simultaneous testing.  Coordination: No dysmetria on finger-to-nose and heel-to-shin bilaterally  Reflexes: Downgoing toes bilaterally     LABS:                        13.5   7.92  )-----------( 223      ( 02 Apr 2024 05:30 )             39.2     04-02    138  |  108  |  13  ----------------------------<  96  3.8   |  17<L>  |  0.68    Ca    9.0      02 Apr 2024 05:30  Phos  3.5     04-02  Mg     2.1     04-02        Urinalysis Basic - ( 02 Apr 2024 05:30 )    Color: x / Appearance: x / SG: x / pH: x  Gluc: 96 mg/dL / Ketone: x  / Bili: x / Urobili: x   Blood: x / Protein: x / Nitrite: x   Leuk Esterase: x / RBC: x / WBC x   Sq Epi: x / Non Sq Epi: x / Bacteria: x        RADIOLOGY & ADDITIONAL TESTS:    COVID swab 4/2: Positive

## 2024-04-02 NOTE — PROGRESS NOTE ADULT - ASSESSMENT
72 y/o M w/PMHX of HTN, BPH, HLD, mitral valve repair present to ED with left sided weakness, left facial droop, and left gaze preference, s/p TNK (3/24/24). Initial NIHSS 17 after TNK was administered, the score was reduced to 15. Stroke code imaging demonstrates right M1 occlusion, now s/p thrombectomy Right M1/2, TICI 0 to 2B (3/24/24). 24-hour CTH post-TNK/thrombectomy demonstrated acute Right MCA infarct, consistent with initial CTH and no acute hemorrhage seen. NIHSS on 3/25/24 improved to 3. TTE/DEBBIE unremarkable. COVID pos 3/26. Pending AR and ILR placement.    Neuro  #Right MCA infarct status post TNK and thrombectomy with Initial NIHSS 17 improved to 3.   - continue on aspirin 81mg and plavix 75mg daily (Plavix restarted on 4/1/2024 since Hematuria has resolved, discussed with Dr. Dinh and Dr. Young)   - continue atorvastatin 40mg daily  - q4hr stroke neuro checks and vitals  - Stroke Code HCT Results: No gross acute intracranial hemorrhage or mass effect. Hyperdense right M1 MCA, concerning for thrombosis.  CTA COW: Abrupt right M1 occlusion with M2 reconstitution.  CTA NECK: Patent, ECAs, ICAs, no  hemodynamically significant stenosis at ICA origins by NASCET criteria. Bilateral vertebral arteries are patent without flow limiting stenosis.  - no need for MRI as stroke is seen on CTH  - Stroke education  - EP c/s for ILR placement - pending. Since pt is COVID(+), ILR placement is on hold for now.     #Headache  - Decadron 1 mg every 6 hours - d/c as headache is resolved  - Topiramate increased from 25mg to 50 mg once daily on 3/26   - d/c tramadol 2/2 sedation  - can also receive PRN IV tylenol for pain    Cards  #HTN  - permissive hypertension, Goal -180  - hold home blood pressure medication for now  - Echo w/bubble: Aortic sclerosis without significant stenosis.  An annuloplasty ring is noted in the mitral position. Mean   transvalvular gradient is 2.30 mmHg at a heart rate of 67 bpm.  < from: DEBBIE w/Doppler (03.26.24 @ 14:01) >   1. Normal left and right ventricular size and systolic function.   2. No LA/RA/DANIEL/RAA thrombus seen.   3. No evidence of an intracardiac shunt.   4. Mild aortic regurgitation.   5. An annuloplasty ring is noted in the mitral position. The mean   transvalvular gradient is 2.00 mmHg at a heart rate of 68 bpm. There is   trace mitral regurgitation.   6. No echo evidence of pulmonary HTN.   7. No pericardial effusion.    #HLD  - high dose statin as above in CVA  - LDL results: 59 mg/dL    Pulm  - call provider if SPO2 < 94%    GI  #Nutrition/Fluids/Electrolytes   - replete K<4 and Mg <2  - Diet: Soft and Bite-sized   - IVF: s/p NS 500cc bolus x 1 as SBP dropped to low 100s after standing the patient up, started maintenance fluids    Renal  - monitor and trend Cr    Infectious Disease  - CXR with Bilateral lower lobe linear opacities that likely represent atelectasis or less likely viral infection.    #COVID   - Remdesevir x 3 days - completed tx.   - COVID positive 3/26 - pending 10 day isolation for rehab  - COVID reswab 3/29 - positive  - COVID Reswab 4/1 - positive  - COVID Reswab 4/2 - positive  - Will continue to perform Daily COVID swabs for the following week.      Endocrine  - A1C results: 5.5    - TSH results: 0.603      Urology  #BPH  - c/w finasteride and flomax    #Hematuria   - Plavix d/c on 3/31 since Hematuria occurred  - Resolved and restarted on Plavix on 4/1, discussed with Dr. Dinh and Dr. Young   - If hematuria recurs, obtain CT urogram and urology consult  - Outpatient Urology upon discharge recommended by hospitalist Dr. Young     DVT Prophylaxis  - lovenox sq for DVT prophylaxis   - SCDs for DVT prophylaxis       IDR Goals: Goals reviewed at interdisciplinary rounds with case management, social work, physical therapy, occupational therapy, and speech language pathology.   Please see specific therapy  notes for in depth goals.  Dispo: AR - can tolerate 3 hours of therapy, can only go to rehab once off isolation (off iso scheduled for Friday)     Discussed daily hospital plans and goals with patient.   Discussed with Neurology Attending Dr. Dinh.

## 2024-04-02 NOTE — PROGRESS NOTE ADULT - SUBJECTIVE AND OBJECTIVE BOX
S: patient seen bedside. no acute complaints. Hematuria resolved. Feels well today.        Vital Signs Last 24 Hrs  T(C): 36.6 (02 Apr 2024 09:42), Max: 37 (01 Apr 2024 16:44)  T(F): 97.8 (02 Apr 2024 09:42), Max: 98.6 (01 Apr 2024 16:44)  HR: 63 (02 Apr 2024 08:17) (63 - 83)  BP: 111/77 (02 Apr 2024 08:17) (100/69 - 116/67)  BP(mean): 88 (02 Apr 2024 08:17) (80 - 91)  RR: 16 (02 Apr 2024 08:17) (16 - 18)  SpO2: 99% (02 Apr 2024 08:17) (97% - 99%)    Parameters below as of 02 Apr 2024 08:17  Patient On (Oxygen Delivery Method): room air            PHYSICAL EXAM:  General: NAD  HEENT: NC/AT; PERRL, clear conjunctiva  Neck: supple  Respiratory: CTA b/l  Cardiovascular: +S1/S2; RRR  Abdomen: soft, NT/ND; +BS x4  Extremities: 2+ peripheral pulses b/l; no LE edema  Skin: normal color and turgor; no rash  Neurological: AAO x 3. no FND.  Psychiatry: appropriate mood/affect                                      13.5   7.92  )-----------( 223      ( 02 Apr 2024 05:30 )             39.2   04-02    138  |  108  |  13  ----------------------------<  96  3.8   |  17<L>  |  0.68    Ca    9.0      02 Apr 2024 05:30  Phos  3.5     04-02  Mg     2.1     04-02

## 2024-04-03 DIAGNOSIS — K59.00 CONSTIPATION, UNSPECIFIED: ICD-10-CM

## 2024-04-03 DIAGNOSIS — R31.0 GROSS HEMATURIA: ICD-10-CM

## 2024-04-03 LAB
ANION GAP SERPL CALC-SCNC: 10 MMOL/L — SIGNIFICANT CHANGE UP (ref 5–17)
BUN SERPL-MCNC: 14 MG/DL — SIGNIFICANT CHANGE UP (ref 7–23)
CALCIUM SERPL-MCNC: 9.6 MG/DL — SIGNIFICANT CHANGE UP (ref 8.4–10.5)
CHLORIDE SERPL-SCNC: 100 MMOL/L — SIGNIFICANT CHANGE UP (ref 96–108)
CO2 SERPL-SCNC: 25 MMOL/L — SIGNIFICANT CHANGE UP (ref 22–31)
CREAT SERPL-MCNC: 0.83 MG/DL — SIGNIFICANT CHANGE UP (ref 0.5–1.3)
EGFR: 93 ML/MIN/1.73M2 — SIGNIFICANT CHANGE UP
GLUCOSE SERPL-MCNC: 97 MG/DL — SIGNIFICANT CHANGE UP (ref 70–99)
HCT VFR BLD CALC: 40.8 % — SIGNIFICANT CHANGE UP (ref 39–50)
HGB BLD-MCNC: 13.8 G/DL — SIGNIFICANT CHANGE UP (ref 13–17)
MAGNESIUM SERPL-MCNC: 2.5 MG/DL — SIGNIFICANT CHANGE UP (ref 1.6–2.6)
MCHC RBC-ENTMCNC: 32.9 PG — SIGNIFICANT CHANGE UP (ref 27–34)
MCHC RBC-ENTMCNC: 33.8 GM/DL — SIGNIFICANT CHANGE UP (ref 32–36)
MCV RBC AUTO: 97.1 FL — SIGNIFICANT CHANGE UP (ref 80–100)
NRBC # BLD: 0 /100 WBCS — SIGNIFICANT CHANGE UP (ref 0–0)
PHOSPHATE SERPL-MCNC: 3.9 MG/DL — SIGNIFICANT CHANGE UP (ref 2.5–4.5)
PLATELET # BLD AUTO: 232 K/UL — SIGNIFICANT CHANGE UP (ref 150–400)
POTASSIUM SERPL-MCNC: 4.6 MMOL/L — SIGNIFICANT CHANGE UP (ref 3.5–5.3)
POTASSIUM SERPL-SCNC: 4.6 MMOL/L — SIGNIFICANT CHANGE UP (ref 3.5–5.3)
RBC # BLD: 4.2 M/UL — SIGNIFICANT CHANGE UP (ref 4.2–5.8)
RBC # FLD: 12.2 % — SIGNIFICANT CHANGE UP (ref 10.3–14.5)
SARS-COV-2 RNA SPEC QL NAA+PROBE: DETECTED
SODIUM SERPL-SCNC: 135 MMOL/L — SIGNIFICANT CHANGE UP (ref 135–145)
WBC # BLD: 6.96 K/UL — SIGNIFICANT CHANGE UP (ref 3.8–10.5)
WBC # FLD AUTO: 6.96 K/UL — SIGNIFICANT CHANGE UP (ref 3.8–10.5)

## 2024-04-03 PROCEDURE — 99231 SBSQ HOSP IP/OBS SF/LOW 25: CPT

## 2024-04-03 PROCEDURE — 99233 SBSQ HOSP IP/OBS HIGH 50: CPT

## 2024-04-03 RX ORDER — MIRTAZAPINE 45 MG/1
7.5 TABLET, ORALLY DISINTEGRATING ORAL AT BEDTIME
Refills: 0 | Status: DISCONTINUED | OUTPATIENT
Start: 2024-04-03 | End: 2024-04-05

## 2024-04-03 RX ADMIN — SENNA PLUS 2 TABLET(S): 8.6 TABLET ORAL at 06:00

## 2024-04-03 RX ADMIN — Medication 50 MILLIGRAM(S): at 21:11

## 2024-04-03 RX ADMIN — FINASTERIDE 5 MILLIGRAM(S): 5 TABLET, FILM COATED ORAL at 11:35

## 2024-04-03 RX ADMIN — MIRTAZAPINE 7.5 MILLIGRAM(S): 45 TABLET, ORALLY DISINTEGRATING ORAL at 21:11

## 2024-04-03 RX ADMIN — CLOPIDOGREL BISULFATE 75 MILLIGRAM(S): 75 TABLET, FILM COATED ORAL at 11:35

## 2024-04-03 RX ADMIN — Medication 81 MILLIGRAM(S): at 11:35

## 2024-04-03 RX ADMIN — Medication 650 MILLIGRAM(S): at 01:18

## 2024-04-03 RX ADMIN — Medication 3 MILLIGRAM(S): at 21:11

## 2024-04-03 RX ADMIN — POLYETHYLENE GLYCOL 3350 17 GRAM(S): 17 POWDER, FOR SOLUTION ORAL at 11:35

## 2024-04-03 RX ADMIN — Medication 650 MILLIGRAM(S): at 00:20

## 2024-04-03 RX ADMIN — Medication 100 MILLIGRAM(S): at 21:14

## 2024-04-03 RX ADMIN — TAMSULOSIN HYDROCHLORIDE 0.4 MILLIGRAM(S): 0.4 CAPSULE ORAL at 21:11

## 2024-04-03 RX ADMIN — Medication 100 MILLIGRAM(S): at 06:00

## 2024-04-03 RX ADMIN — ATORVASTATIN CALCIUM 40 MILLIGRAM(S): 80 TABLET, FILM COATED ORAL at 21:11

## 2024-04-03 NOTE — PROGRESS NOTE ADULT - PROBLEM SELECTOR PLAN 2
likely cause of fevers; clinically resolved; s/p 3-day course of remdesivir (given mild disease and risk factors, in Pt. not admitted for COVID-19)  -- cont. contact + airborne precautions x10 days total, per Epidemiology

## 2024-04-03 NOTE — PROGRESS NOTE ADULT - ASSESSMENT
72 y/o M w/PMHX of HTN, BPH, HLD, mitral valve repair present to ED with left sided weakness, left facial droop, and left gaze preference, s/p TNK (3/24/24). Initial NIHSS 17 after TNK was administered, the score was reduced to 15. Stroke code imaging demonstrates right M1 occlusion, now s/p thrombectomy Right M1/2, TICI 0 to 2B (3/24/24). 24-hour CTH post-TNK/thrombectomy demonstrated acute Right MCA infarct, consistent with initial CTH and no acute hemorrhage seen. NIHSS on 3/25/24 improved to 3. TTE/DEBBIE unremarkable. COVID pos 3/26. Pending AR and ILR placement after isolation period ends on 4/5/2024.    Neuro  #Right MCA infarct status post TNK and thrombectomy with Initial NIHSS 17 improved to 3.   - continue on aspirin 81mg and plavix 75mg daily (Plavix restarted on 4/1/2024 since Hematuria has resolved, discussed with Dr. Dinh and Dr. Young)   - continue atorvastatin 40mg daily  - q4hr stroke neuro checks and vitals  - Stroke Code HCT Results: No gross acute intracranial hemorrhage or mass effect. Hyperdense right M1 MCA, concerning for thrombosis.  CTA COW: Abrupt right M1 occlusion with M2 reconstitution.  CTA NECK: Patent, ECAs, ICAs, no  hemodynamically significant stenosis at ICA origins by NASCET criteria. Bilateral vertebral arteries are patent without flow limiting stenosis.  - no need for MRI as stroke is seen on CTH  - Stroke education  - EP c/s for ILR placement - pending. Since pt is COVID(+), ILR placement is on hold for now.     #Headache  - Decadron 1 mg every 6 hours - d/c as headache is resolved  - Topiramate increased from 25mg to 50 mg once daily on 3/26   - d/c tramadol 2/2 sedation  - can also receive PRN IV tylenol for pain    Cards  #HTN  - permissive hypertension, Goal -180  - hold home blood pressure medication for now  - Echo w/bubble: Aortic sclerosis without significant stenosis.  An annuloplasty ring is noted in the mitral position. Mean   transvalvular gradient is 2.30 mmHg at a heart rate of 67 bpm.  < from: DEBBIE w/Doppler (03.26.24 @ 14:01) >   1. Normal left and right ventricular size and systolic function.   2. No LA/RA/DANIEL/RAA thrombus seen.   3. No evidence of an intracardiac shunt.   4. Mild aortic regurgitation.   5. An annuloplasty ring is noted in the mitral position. The mean   transvalvular gradient is 2.00 mmHg at a heart rate of 68 bpm. There is   trace mitral regurgitation.   6. No echo evidence of pulmonary HTN.   7. No pericardial effusion.    #HLD  - high dose statin as above in CVA  - LDL results: 59 mg/dL    Pulm  - call provider if SPO2 < 94%    GI  #Nutrition/Fluids/Electrolytes   - replete K<4 and Mg <2  - Diet: Soft and Bite-sized   - IVF: s/p NS 500cc bolus x 1 as SBP dropped to low 100s after standing the patient up, started maintenance fluids    Renal  - monitor and trend Cr    Infectious Disease  - CXR with Bilateral lower lobe linear opacities that likely represent atelectasis or less likely viral infection.    #COVID   - Remdesevir x 3 days - completed tx.   - COVID positive 3/26 - pending 10 day isolation for rehab  - COVID reswab 3/29 - positive  - COVID Reswab 4/1 - positive  - COVID Reswab 4/2 - positive  - COVID Reswab 4/3 - positive   - Will continue to perform Daily COVID swabs for the following week.      Endocrine  - A1C results: 5.5    - TSH results: 0.603      Urology  #BPH  - c/w finasteride and flomax    #Hematuria   - Plavix d/c on 3/31 since Hematuria occurred  - Resolved and restarted on Plavix on 4/1, discussed with Dr. Dinh and Dr. Young   - If hematuria recurs, obtain CT urogram and urology consult  - Outpatient Urology upon discharge recommended by hospitalist Dr. Young     DVT Prophylaxis  - lovenox sq for DVT prophylaxis   - SCDs for DVT prophylaxis       IDR Goals: Goals reviewed at interdisciplinary rounds with case management, social work, physical therapy, occupational therapy, and speech language pathology.   Please see specific therapy  notes for in depth goals.  Dispo: AR - can tolerate 3 hours of therapy, can only go to rehab once off isolation (off iso scheduled for Friday, 4/5)     Discussed daily hospital plans and goals with patient.   Discussed with Neurology Attending Dr. Schreiber.

## 2024-04-03 NOTE — PROGRESS NOTE ADULT - SUBJECTIVE AND OBJECTIVE BOX
Patient is a 73y old  Male who presents with a chief complaint of Acute Stroke (03 Apr 2024 09:30)      INTERVAL HPI/OVERNIGHT EVENTS:    Pt. seen and examined earlier today  Pt. feels well, is looking forward to acute rehab placement  No further episodes of hematuria  Pt. denies CP, SOB, cough    Review of Systems: 12 point review of systems otherwise negative    MEDICATIONS  (STANDING):  aspirin enteric coated 81 milliGRAM(s) Oral daily  atorvastatin 40 milliGRAM(s) Oral at bedtime  benzonatate 100 milliGRAM(s) Oral every 8 hours  clopidogrel Tablet 75 milliGRAM(s) Oral daily  enoxaparin Injectable 40 milliGRAM(s) SubCutaneous every 24 hours  finasteride 5 milliGRAM(s) Oral daily  lidocaine   4% Patch 1 Patch Transdermal daily  lidocaine   4% Patch 1 Patch Transdermal every 24 hours  melatonin 3 milliGRAM(s) Oral at bedtime  polyethylene glycol 3350 17 Gram(s) Oral daily  senna 2 Tablet(s) Oral two times a day  sodium chloride 0.9%. 1000 milliLiter(s) (75 mL/Hr) IV Continuous <Continuous>  tamsulosin 0.4 milliGRAM(s) Oral at bedtime  topiramate 50 milliGRAM(s) Oral every 24 hours    MEDICATIONS  (PRN):  acetaminophen     Tablet .. 650 milliGRAM(s) Oral every 6 hours PRN Temp greater or equal to 38C (100.4F), Mild Pain (1 - 3)  benzocaine/menthol Lozenge 1 Lozenge Oral three times a day PRN Sore Throat  bisacodyl Suppository 10 milliGRAM(s) Rectal daily PRN Constipation  ondansetron Injectable 4 milliGRAM(s) IV Push every 8 hours PRN Nausea and/or Vomiting      Allergies    Allergy Status Unknown    Intolerances          Vital Signs Last 24 Hrs  T(C): 36.8 (03 Apr 2024 13:55), Max: 36.8 (03 Apr 2024 13:55)  T(F): 98.2 (03 Apr 2024 13:55), Max: 98.2 (03 Apr 2024 13:55)  HR: 89 (03 Apr 2024 12:36) (65 - 97)  BP: 115/70 (03 Apr 2024 12:36) (90/75 - 117/72)  BP(mean): 86 (03 Apr 2024 12:36) (79 - 87)  RR: 16 (03 Apr 2024 12:15) (16 - 16)  SpO2: 99% (03 Apr 2024 12:15) (97% - 99%)    Parameters below as of 03 Apr 2024 12:15  Patient On (Oxygen Delivery Method): room air      CAPILLARY BLOOD GLUCOSE          04-02 @ 07:01  -  04-03 @ 07:00  --------------------------------------------------------  IN: 474 mL / OUT: 600 mL / NET: -126 mL    04-03 @ 07:01  -  04-03 @ 15:14  --------------------------------------------------------  IN: 0 mL / OUT: 200 mL / NET: -200 mL        Physical Exam:  (earlier today)  Daily     Daily   General:  well appearing in NAD  HEENT:  MMM  CV:  RRR  Lungs:  CTA B/L  Abdomen:  soft NT ND  Extremities:  no edema B/L LE  Skin:  WWP  Neuro:  AAOx3, no dysarthria    LABS:                        13.8   6.96  )-----------( 232      ( 03 Apr 2024 05:30 )             40.8     04-03    135  |  100  |  14  ----------------------------<  97  4.6   |  25  |  0.83    Ca    9.6      03 Apr 2024 05:30  Phos  3.9     04-03  Mg     2.5     04-03        Urinalysis Basic - ( 03 Apr 2024 05:30 )    Color: x / Appearance: x / SG: x / pH: x  Gluc: 97 mg/dL / Ketone: x  / Bili: x / Urobili: x   Blood: x / Protein: x / Nitrite: x   Leuk Esterase: x / RBC: x / WBC x   Sq Epi: x / Non Sq Epi: x / Bacteria: x

## 2024-04-03 NOTE — CHART NOTE - NSCHARTNOTEFT_GEN_A_CORE
Documentation of ELLEN as emergency procedure    ·       ACP Template : Tenecteplase/ ELLEN emergency procedure   Case discussed with Dr. Rubio and Dr. Schreiber_prior to urgent intervention. Risk and benefits of alteplase were discussed with patient/family member including risk of symptomatic ICH/death up to 6.4%. Decision was made that benefits outweighed risks and Tenecteplase was administered. Thrombectomy was discussed between Neurology and Neuro IR Attendings and decision was made to proceed. Patient able to consent for self consented for emergency thrombectomy after discussion of risk and benefits of procedure.
Patient can tolerate 3 hours of Acute Rehab therapy.
Admitting Diagnosis:   Patient is a 73y old  Male who presents with a chief complaint of Acute Stroke (01 Apr 2024 13:24)      PAST MEDICAL & SURGICAL HISTORY:  Hypertension      History of BPH      CVA (cerebrovascular accident)      Hyperlipidemia          Current Nutrition Order: Regular diet + IV NS    PO Intake: Good (%) [   ]  Fair (50-75%) [   ] Poor (<25%) [   ]    GI Issues: no s/s GI distress noted, latest BM 3/31    Pain: no pain/discomfort noted    Skin Integrity: no pressure injuries noted, Dejuan score 20    I&Os:   03-31-24 @ 07:01  -  04-01-24 @ 07:00  --------------------------------------------------------  IN: 336 mL / OUT: 350 mL / NET: -14 mL    04-01-24 @ 07:01  -  04-01-24 @ 15:00  --------------------------------------------------------  IN: 657 mL / OUT: 325 mL / NET: 332 mL        Labs:         CAPILLARY BLOOD GLUCOSE          Medications:  MEDICATIONS  (STANDING):  aspirin enteric coated 81 milliGRAM(s) Oral daily  atorvastatin 40 milliGRAM(s) Oral at bedtime  benzonatate 100 milliGRAM(s) Oral every 8 hours  clopidogrel Tablet 75 milliGRAM(s) Oral daily  enoxaparin Injectable 40 milliGRAM(s) SubCutaneous every 24 hours  finasteride 5 milliGRAM(s) Oral daily  lidocaine   4% Patch 1 Patch Transdermal daily  lidocaine   4% Patch 1 Patch Transdermal every 24 hours  melatonin 3 milliGRAM(s) Oral at bedtime  polyethylene glycol 3350 17 Gram(s) Oral daily  senna 2 Tablet(s) Oral two times a day  sodium chloride 0.9%. 1000 milliLiter(s) (75 mL/Hr) IV Continuous <Continuous>  tamsulosin 0.4 milliGRAM(s) Oral at bedtime  topiramate 50 milliGRAM(s) Oral every 24 hours    MEDICATIONS  (PRN):  acetaminophen     Tablet .. 650 milliGRAM(s) Oral every 6 hours PRN Temp greater or equal to 38C (100.4F), Mild Pain (1 - 3)  benzocaine/menthol Lozenge 1 Lozenge Oral three times a day PRN Sore Throat  ondansetron Injectable 4 milliGRAM(s) IV Push every 8 hours PRN Nausea and/or Vomiting      Weight:  3/24 66.9kg    Weight Change: No other weights obtained, consider weekly weights for trending    Estimated energy needs:   Current body wt [66.9kg] used for energy calculations as pt falls within % IBW. Needs estimated for age and adjusted for current clinical status, increased needs for malnutrition/repletion. Fluid needs per team*    Calories: 6306-6982 kcals based on 25-35 kcals/kg  Protein: 80.3-100.3g based on 1.2-1.5g protein/kg  Fluid needs per team*    Subjective:   73y/M with HTN, BPH, HLD, coming in with left sided weakness. Pt states that he woke up at 5 am and went for a run. He came back and went to shower. He said he slipped and hit his head on the shower door. Wife states that he heard a thud from the shower around 7:45AM. She went in and noticed his left side was weak and his head was against the shower door. He had not fallen to the ground. She called her daughter who came by, and they also called EMS. Pt states that he slipped and fell, does not think he has any actual weakness. He wonders how his dog is doing. 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 NIHSS 17. Pt states he has a headache, 9/10 but says it isn't that bad and does not need medication for the headache. Case discussed with Dr. Narinder Sherwood who discussed with Dr. Schreiber prior to urgent intervention. Risk and benefits of tenecteplase were discussed with patient/family member including risk of symptomatic ICH/death. Decision was made that benefits outweighed risks and tenecteplase was administered. Thrombectomy was discussed between Neurology and Neuro IR Attendings and decision was made to proceed with thrombectomy. Tenecteplase given at 8:46AM.  Pt was seen about 30 minutes after TNK was given, NIHSS improved to 15. Headache similar to how it was prior to tenecteplase administration    Chart reviewed. Pt observed at bedside on 5 LA, receiving care from RN at time of assessment. Subjective per chart. Currently ordered for Regular PO diet and tolerating, no s/s intolerance noted. Previously noted with fair to good appetite & PO intake, receiving additional snacks throughout the day per preference. No s/s GI intolerance noted. Labs reviewed. Meds- on senna, zofran prn, miralax. RDN will continue to monitor, reassess, and intervene as appropriate.     Previous Nutrition Diagnosis: Malnutrition... [Moderate] related to inadequate PO intake as evidenced by <75% EER x >1 month, reported unintentional wt loss of ~20lbs, NFPE findings    Active [ X ]  Resolved [   ]    If resolved, new PES:     Goal:  Pt will meet at least 75% of protein & energy needs via most appropriate route for nutrition     Recommendations:  1. c/w current diet order  - continue additional snacks per preference  - monitor intake & tolerance  - honor food preferences as able  - small, frequent meals encouraged  2. Consider oral nutrition supplement if PO intake suboptimal to meet needs  3. Hydration per team  4. Monitor chemistry, GI function, skin integrity   5. Pain & bowel regimen per team    Education: deferred    Risk Level: High [ X  ] Moderate [   ] Low [   ]
Admitting Diagnosis:   Patient is a 73y old  Male who presents with a chief complaint of Acute Stroke (03 Apr 2024 09:30)      PAST MEDICAL & SURGICAL HISTORY:  Hypertension      History of BPH      CVA (cerebrovascular accident)      Hyperlipidemia          Current Nutrition Order: Regular + IV NS    PO Intake: Good (%) [ X  ]  Fair (50-75%) [   ] Poor (<25%) [   ] - per RN    GI Issues: some constipation noted, on bowel regimen    Pain: no pain/discomfort noted    Skin Integrity: R groin sx incision, Dejuan score 20    I&Os:   04-02-24 @ 07:01  -  04-03-24 @ 07:00  --------------------------------------------------------  IN: 474 mL / OUT: 600 mL / NET: -126 mL    04-03-24 @ 07:01  -  04-03-24 @ 15:07  --------------------------------------------------------  IN: 0 mL / OUT: 200 mL / NET: -200 mL        Labs:   04-03    135  |  100  |  14  ----------------------------<  97  4.6   |  25  |  0.83    Ca    9.6      03 Apr 2024 05:30  Phos  3.9     04-03  Mg     2.5     04-03      CAPILLARY BLOOD GLUCOSE          Medications:  MEDICATIONS  (STANDING):  aspirin enteric coated 81 milliGRAM(s) Oral daily  atorvastatin 40 milliGRAM(s) Oral at bedtime  benzonatate 100 milliGRAM(s) Oral every 8 hours  clopidogrel Tablet 75 milliGRAM(s) Oral daily  enoxaparin Injectable 40 milliGRAM(s) SubCutaneous every 24 hours  finasteride 5 milliGRAM(s) Oral daily  lidocaine   4% Patch 1 Patch Transdermal daily  lidocaine   4% Patch 1 Patch Transdermal every 24 hours  melatonin 3 milliGRAM(s) Oral at bedtime  polyethylene glycol 3350 17 Gram(s) Oral daily  senna 2 Tablet(s) Oral two times a day  sodium chloride 0.9%. 1000 milliLiter(s) (75 mL/Hr) IV Continuous <Continuous>  tamsulosin 0.4 milliGRAM(s) Oral at bedtime  topiramate 50 milliGRAM(s) Oral every 24 hours    MEDICATIONS  (PRN):  acetaminophen     Tablet .. 650 milliGRAM(s) Oral every 6 hours PRN Temp greater or equal to 38C (100.4F), Mild Pain (1 - 3)  benzocaine/menthol Lozenge 1 Lozenge Oral three times a day PRN Sore Throat  bisacodyl Suppository 10 milliGRAM(s) Rectal daily PRN Constipation  ondansetron Injectable 4 milliGRAM(s) IV Push every 8 hours PRN Nausea and/or Vomiting      Weight:  3/24 66.9kg    Weight Change: No other weights obtained, consider weekly weights for trending    IBW: 75.3kg  % IBW: 88.8%    Estimated energy needs:   Current body wt [66.9kg] used for energy calculations as pt falls within % IBW. Needs estimated for age and adjusted for current clinical status, increased needs for malnutrition/repletion. Fluid needs per team*    Calories: 5507-0628 kcals based on 25-35 kcals/kg  Protein: 80.3-100.3g based on 1.2-1.5g protein/kg  Fluid needs per team*    Subjective:   72 y/o M w/PMHX of HTN, BPH, HLD, mitral valve repair present to ED with left sided weakness, left facial droop, and left gaze preference, s/p TNK (3/24/24). Initial NIHSS 17 after TNK was administered, the score was reduced to 15. Stroke code imaging demonstrates right M1 occlusion, s/p thrombectomy Right M1/2, TICI 0 to 2B (3/24/24). 24-hour CTH post-TNK/thrombectomy demonstrated acute Right MCA infarct, consistent with initial CTH and no acute hemorrhage seen. NIHSS on 3/25/24 improved to 3.     Chart reviewed. Pt observed on 5 LA, on room air- receiving care from team. Discussed with RN. Pt currently ordered for Regular PO diet with additional snacks per preference. On IV NS. Per RN, pt with good appetite & PO intake, eating well. No difficulty with chew/swallow noted, diet tolerated well. Constipation noted, on bowel regimen- adequate fluid & fiber encouraged. Labs reviewed- Na 135 borderline low [monitor fluid/volume status]. Meds- on zofran prn, miralax, dulcolax. RDN will continue to monitor, reassess, and intervene as appropriate.     Previous Nutrition Diagnosis:  Malnutrition... [Moderate] related to inadequate PO intake as evidenced by <75% EER x >1 month, reported unintentional wt loss of ~20lbs, NFPE findings    Active [ X  ]  Resolved [   ]    If resolved, new PES:     Goal:  Pt will meet at least 75% of protein & energy needs via most appropriate route for nutrition     Recommendations:  1. c/w current diet order  - continue additional snacks per preference  - monitor intake & tolerance  - honor food preferences as able  - small, frequent meals encouraged  2. Consider oral nutrition supplement if PO intake suboptimal to meet needs [<50% at meals]  3. GI  - adequate fluid & fiber encouraged  - bowel regimen per team  4. Hydration per team  5. Monitor chemistry, GI function, skin integrity   6. Pain regimen per team    Education: deferred    Risk Level: High [   ] Moderate [  X ] Low [   ]

## 2024-04-03 NOTE — PROGRESS NOTE ADULT - SUBJECTIVE AND OBJECTIVE BOX
INTERVAL HPI/OVERNIGHT EVENTS:  Patient seen and examined. He was A&Ox3, cooperative throughout the examination. States to be feeling much better and ready for the next steps of his treatment.     MEDICATIONS  (STANDING):  aspirin enteric coated 81 milliGRAM(s) Oral daily  atorvastatin 40 milliGRAM(s) Oral at bedtime  benzonatate 100 milliGRAM(s) Oral every 8 hours  clopidogrel Tablet 75 milliGRAM(s) Oral daily  enoxaparin Injectable 40 milliGRAM(s) SubCutaneous every 24 hours  finasteride 5 milliGRAM(s) Oral daily  lidocaine   4% Patch 1 Patch Transdermal daily  lidocaine   4% Patch 1 Patch Transdermal every 24 hours  melatonin 3 milliGRAM(s) Oral at bedtime  polyethylene glycol 3350 17 Gram(s) Oral daily  senna 2 Tablet(s) Oral two times a day  sodium chloride 0.9%. 1000 milliLiter(s) (75 mL/Hr) IV Continuous <Continuous>  tamsulosin 0.4 milliGRAM(s) Oral at bedtime  topiramate 50 milliGRAM(s) Oral every 24 hours    MEDICATIONS  (PRN):  acetaminophen     Tablet .. 650 milliGRAM(s) Oral every 6 hours PRN Temp greater or equal to 38C (100.4F), Mild Pain (1 - 3)  benzocaine/menthol Lozenge 1 Lozenge Oral three times a day PRN Sore Throat  bisacodyl Suppository 10 milliGRAM(s) Rectal daily PRN Constipation  ondansetron Injectable 4 milliGRAM(s) IV Push every 8 hours PRN Nausea and/or Vomiting      Allergies    Allergy Status Unknown    Intolerances        Vital Signs Last 24 Hrs  T(C): 36.6 (02 Apr 2024 09:42), Max: 37 (01 Apr 2024 16:44)  T(F): 97.8 (02 Apr 2024 09:42), Max: 98.6 (01 Apr 2024 16:44)  HR: 63 (02 Apr 2024 08:17) (63 - 83)  BP: 111/77 (02 Apr 2024 08:17) (100/69 - 116/67)  BP(mean): 88 (02 Apr 2024 08:17) (80 - 91)  RR: 16 (02 Apr 2024 08:17) (16 - 18)  SpO2: 99% (02 Apr 2024 08:17) (97% - 99%)    Parameters below as of 02 Apr 2024 08:17  Patient On (Oxygen Delivery Method): room air        Physical exam:  General: No acute distress, awake and alert  Eyes: Anicteric sclerae, moist conjunctivae, see below for CNs  Neck: trachea midline, FROM  Cardiovascular: Regular rate and rhythm  Pulmonary: No use of accessory muscles    Neurologic:  -Mental status: Awake, alert, oriented to person, place, and time. Speech is fluent with intact naming, repetition, and comprehension, no dysarthria. Recent and remote memory intact. Follows commands. Attention/concentration intact. Fund of knowledge appropriate.  -Cranial nerves:   II: Visual fields are full to confrontation.  III, IV, VI: Extraocular movements are intact without nystagmus. Pupils equally round and reactive to light  V:  Facial sensation V1-V3 equal and intact   VII: Mild left nasolabial fold flattening  Motor: Normal bulk and tone. Left upper extremity drift with no pronator drift. Right upper and lower extremities 5/5. Left upper extremity 4/5, Left lower extremity 4+/5.  Sensation: Intact to light touch bilaterally. No neglect or extinction on double simultaneous testing.  Coordination: No dysmetria on finger-to-nose bilaterally.    LABS:                        13.5   7.92  )-----------( 223      ( 02 Apr 2024 05:30 )             39.2     04-02    138  |  108  |  13  ----------------------------<  96  3.8   |  17<L>  |  0.68    Ca    9.0      02 Apr 2024 05:30  Phos  3.5     04-02  Mg     2.1     04-02        Urinalysis Basic - ( 02 Apr 2024 05:30 )    Color: x / Appearance: x / SG: x / pH: x  Gluc: 96 mg/dL / Ketone: x  / Bili: x / Urobili: x   Blood: x / Protein: x / Nitrite: x   Leuk Esterase: x / RBC: x / WBC x   Sq Epi: x / Non Sq Epi: x / Bacteria: x        RADIOLOGY & ADDITIONAL TESTS:  Reviewed.   COVID Swab 4/3: Positive.

## 2024-04-04 LAB
ANION GAP SERPL CALC-SCNC: 12 MMOL/L — SIGNIFICANT CHANGE UP (ref 5–17)
BUN SERPL-MCNC: 14 MG/DL — SIGNIFICANT CHANGE UP (ref 7–23)
CALCIUM SERPL-MCNC: 9.8 MG/DL — SIGNIFICANT CHANGE UP (ref 8.4–10.5)
CHLORIDE SERPL-SCNC: 105 MMOL/L — SIGNIFICANT CHANGE UP (ref 96–108)
CO2 SERPL-SCNC: 23 MMOL/L — SIGNIFICANT CHANGE UP (ref 22–31)
CREAT SERPL-MCNC: 0.79 MG/DL — SIGNIFICANT CHANGE UP (ref 0.5–1.3)
EGFR: 94 ML/MIN/1.73M2 — SIGNIFICANT CHANGE UP
GLUCOSE SERPL-MCNC: 109 MG/DL — HIGH (ref 70–99)
HCT VFR BLD CALC: 44.1 % — SIGNIFICANT CHANGE UP (ref 39–50)
HGB BLD-MCNC: 15.2 G/DL — SIGNIFICANT CHANGE UP (ref 13–17)
MAGNESIUM SERPL-MCNC: 2.3 MG/DL — SIGNIFICANT CHANGE UP (ref 1.6–2.6)
MCHC RBC-ENTMCNC: 32.9 PG — SIGNIFICANT CHANGE UP (ref 27–34)
MCHC RBC-ENTMCNC: 34.5 GM/DL — SIGNIFICANT CHANGE UP (ref 32–36)
MCV RBC AUTO: 95.5 FL — SIGNIFICANT CHANGE UP (ref 80–100)
NRBC # BLD: 0 /100 WBCS — SIGNIFICANT CHANGE UP (ref 0–0)
PHOSPHATE SERPL-MCNC: 3.8 MG/DL — SIGNIFICANT CHANGE UP (ref 2.5–4.5)
PLATELET # BLD AUTO: 286 K/UL — SIGNIFICANT CHANGE UP (ref 150–400)
POTASSIUM SERPL-MCNC: 4.2 MMOL/L — SIGNIFICANT CHANGE UP (ref 3.5–5.3)
POTASSIUM SERPL-SCNC: 4.2 MMOL/L — SIGNIFICANT CHANGE UP (ref 3.5–5.3)
RBC # BLD: 4.62 M/UL — SIGNIFICANT CHANGE UP (ref 4.2–5.8)
RBC # FLD: 12.3 % — SIGNIFICANT CHANGE UP (ref 10.3–14.5)
SODIUM SERPL-SCNC: 140 MMOL/L — SIGNIFICANT CHANGE UP (ref 135–145)
WBC # BLD: 8.8 K/UL — SIGNIFICANT CHANGE UP (ref 3.8–10.5)
WBC # FLD AUTO: 8.8 K/UL — SIGNIFICANT CHANGE UP (ref 3.8–10.5)

## 2024-04-04 PROCEDURE — 99231 SBSQ HOSP IP/OBS SF/LOW 25: CPT

## 2024-04-04 PROCEDURE — 99233 SBSQ HOSP IP/OBS HIGH 50: CPT

## 2024-04-04 RX ADMIN — CLOPIDOGREL BISULFATE 75 MILLIGRAM(S): 75 TABLET, FILM COATED ORAL at 11:23

## 2024-04-04 RX ADMIN — ATORVASTATIN CALCIUM 40 MILLIGRAM(S): 80 TABLET, FILM COATED ORAL at 21:38

## 2024-04-04 RX ADMIN — Medication 100 MILLIGRAM(S): at 21:38

## 2024-04-04 RX ADMIN — FINASTERIDE 5 MILLIGRAM(S): 5 TABLET, FILM COATED ORAL at 11:25

## 2024-04-04 RX ADMIN — Medication 81 MILLIGRAM(S): at 11:23

## 2024-04-04 RX ADMIN — MIRTAZAPINE 7.5 MILLIGRAM(S): 45 TABLET, ORALLY DISINTEGRATING ORAL at 21:37

## 2024-04-04 RX ADMIN — Medication 100 MILLIGRAM(S): at 05:58

## 2024-04-04 RX ADMIN — TAMSULOSIN HYDROCHLORIDE 0.4 MILLIGRAM(S): 0.4 CAPSULE ORAL at 21:38

## 2024-04-04 RX ADMIN — Medication 3 MILLIGRAM(S): at 21:38

## 2024-04-04 RX ADMIN — Medication 50 MILLIGRAM(S): at 21:38

## 2024-04-04 NOTE — PROGRESS NOTE ADULT - ASSESSMENT
74 y/o M w/PMHX of HTN, BPH, HLD, mitral valve repair present to ED with left sided weakness, left facial droop, and left gaze preference, s/p TNK (3/24/24). Initial NIHSS 17 after TNK was administered, the score was reduced to 15. Stroke code imaging demonstrates right M1 occlusion, now s/p thrombectomy Right M1/2, TICI 0 to 2B (3/24/24). 24-hour CTH post-TNK/thrombectomy demonstrated acute Right MCA infarct, consistent with initial CTH and no acute hemorrhage seen. NIHSS on 3/25/24 improved to 3. TTE/DEBBIE unremarkable. COVID pos 3/26. Pending AR and ILR placement after isolation period ends on 4/5/2024.    Neuro  #Right MCA infarct status post TNK and thrombectomy with Initial NIHSS 17 improved to 3.   - continue on aspirin 81mg and plavix 75mg daily (Plavix restarted on 4/1/2024 since Hematuria has resolved, discussed with Dr. Dinh and Dr. Young)   - continue atorvastatin 40mg daily  - q4hr stroke neuro checks and vitals  - Stroke Code HCT Results: No gross acute intracranial hemorrhage or mass effect. Hyperdense right M1 MCA, concerning for thrombosis.  CTA COW: Abrupt right M1 occlusion with M2 reconstitution.  CTA NECK: Patent, ECAs, ICAs, no  hemodynamically significant stenosis at ICA origins by NASCET criteria. Bilateral vertebral arteries are patent without flow limiting stenosis.  - no need for MRI as stroke is seen on CTH  - Stroke education  - EP c/s for ILR placement - pending. Since pt is COVID(+), ILR placement is on hold for now. Will be placed on 4/5/2024 due to this being day 10 of isolation period.     #Headache  - Decadron 1 mg every 6 hours - d/c as headache is resolved  - Topiramate increased from 25mg to 50 mg once daily on 3/26   - d/c tramadol 2/2 sedation  - can also receive PRN IV tylenol for pain    Cards  #HTN  - permissive hypertension, Goal -180  - hold home blood pressure medication for now  - Echo w/bubble: Aortic sclerosis without significant stenosis.  An annuloplasty ring is noted in the mitral position. Mean   transvalvular gradient is 2.30 mmHg at a heart rate of 67 bpm.  < from: DEBBIE w/Doppler (03.26.24 @ 14:01) >   1. Normal left and right ventricular size and systolic function.   2. No LA/RA/DANIEL/RAA thrombus seen.   3. No evidence of an intracardiac shunt.   4. Mild aortic regurgitation.   5. An annuloplasty ring is noted in the mitral position. The mean   transvalvular gradient is 2.00 mmHg at a heart rate of 68 bpm. There is   trace mitral regurgitation.   6. No echo evidence of pulmonary HTN.   7. No pericardial effusion.    #HLD  - high dose statin as above in CVA  - LDL results: 59 mg/dL    Pulm  - call provider if SPO2 < 94%    GI  #Nutrition/Fluids/Electrolytes   - replete K<4 and Mg <2  - Diet: Soft and Bite-sized   - IVF: s/p NS 500cc bolus x 1 as SBP dropped to low 100s after standing the patient up, started maintenance fluids    Renal  - monitor and trend Cr    Infectious Disease  - CXR with Bilateral lower lobe linear opacities that likely represent atelectasis or less likely viral infection.    #COVID   - Remdesevir x 3 days - completed tx.   - COVID positive 3/26 - pending 10 day isolation for rehab  - COVID reswab 3/29 - positive  - COVID Reswab 4/1 - positive  - COVID Reswab 4/2 - positive  - COVID Reswab 4/3 - positive   - 10-day isolation period ends tomorrow, 4/5/2024.     Endocrine  - A1C results: 5.5    - TSH results: 0.603      Urology  #BPH  - c/w finasteride and flomax    #Hematuria   - Plavix d/c on 3/31 since Hematuria occurred  - Resolved and restarted on Plavix on 4/1, discussed with Dr. Dinh and Dr. Young   - If hematuria recurs, obtain CT urogram and urology consult  - Outpatient Urology upon discharge recommended by hospitalist Dr. Young     DVT Prophylaxis  - lovenox sq for DVT prophylaxis   - SCDs for DVT prophylaxis       IDR Goals: Goals reviewed at interdisciplinary rounds with case management, social work, physical therapy, occupational therapy, and speech language pathology.   Please see specific therapy  notes for in depth goals.  Dispo: AR - can tolerate 3 hours of therapy, can only go to rehab once off isolation (off iso scheduled for Friday, 4/5/2024)     Discussed daily hospital plans and goals with patient.   Discussed with Neurology Attending Dr. Schreiber.

## 2024-04-04 NOTE — PROGRESS NOTE ADULT - SUBJECTIVE AND OBJECTIVE BOX
INTERVAL HPI/OVERNIGHT EVENTS:  Patient seen and examined. He was A&Ox3, cooperative throughout the examination. States to be feeling much better and ready for the next steps of his treatment.     MEDICATIONS  (STANDING):  aspirin enteric coated 81 milliGRAM(s) Oral daily  atorvastatin 40 milliGRAM(s) Oral at bedtime  benzonatate 100 milliGRAM(s) Oral every 8 hours  clopidogrel Tablet 75 milliGRAM(s) Oral daily  enoxaparin Injectable 40 milliGRAM(s) SubCutaneous every 24 hours  finasteride 5 milliGRAM(s) Oral daily  lidocaine   4% Patch 1 Patch Transdermal daily  lidocaine   4% Patch 1 Patch Transdermal every 24 hours  melatonin 3 milliGRAM(s) Oral at bedtime  polyethylene glycol 3350 17 Gram(s) Oral daily  senna 2 Tablet(s) Oral two times a day  sodium chloride 0.9%. 1000 milliLiter(s) (75 mL/Hr) IV Continuous <Continuous>  tamsulosin 0.4 milliGRAM(s) Oral at bedtime  topiramate 50 milliGRAM(s) Oral every 24 hours    MEDICATIONS  (PRN):  acetaminophen     Tablet .. 650 milliGRAM(s) Oral every 6 hours PRN Temp greater or equal to 38C (100.4F), Mild Pain (1 - 3)  benzocaine/menthol Lozenge 1 Lozenge Oral three times a day PRN Sore Throat  bisacodyl Suppository 10 milliGRAM(s) Rectal daily PRN Constipation  ondansetron Injectable 4 milliGRAM(s) IV Push every 8 hours PRN Nausea and/or Vomiting      Allergies    Allergy Status Unknown    Intolerances        Vital Signs Last 24 Hrs  T(C): 36.6 (02 Apr 2024 09:42), Max: 37 (01 Apr 2024 16:44)  T(F): 97.8 (02 Apr 2024 09:42), Max: 98.6 (01 Apr 2024 16:44)  HR: 63 (02 Apr 2024 08:17) (63 - 83)  BP: 111/77 (02 Apr 2024 08:17) (100/69 - 116/67)  BP(mean): 88 (02 Apr 2024 08:17) (80 - 91)  RR: 16 (02 Apr 2024 08:17) (16 - 18)  SpO2: 99% (02 Apr 2024 08:17) (97% - 99%)    Parameters below as of 02 Apr 2024 08:17  Patient On (Oxygen Delivery Method): room air        Physical exam:  General: No acute distress, awake and alert  Eyes: Anicteric sclerae, moist conjunctivae, see below for CNs  Neck: trachea midline, FROM  Cardiovascular: Regular rate and rhythm  Pulmonary: No use of accessory muscles    Neurologic:  -Mental status: Awake, alert, oriented to person, place, and time. Speech is fluent with intact naming, repetition, and comprehension, no dysarthria. Recent and remote memory intact. Follows commands. Attention/concentration intact. Fund of knowledge appropriate.  -Cranial nerves:   II: Visual fields are full to confrontation.  III, IV, VI: Extraocular movements are intact without nystagmus. Pupils equally round and reactive to light  V:  Facial sensation V1-V3 equal and intact   VII: Mild left nasolabial fold flattening  Motor: Normal bulk and tone. Left upper extremity drift with no pronator drift. Right upper and lower extremities 5/5. Left upper extremity 4/5, Left lower extremity 4+/5.  Sensation: Intact to light touch bilaterally. No neglect or extinction on double simultaneous testing.  Coordination: No dysmetria on finger-to-nose bilaterally.    LABS:                        13.5   7.92  )-----------( 223      ( 02 Apr 2024 05:30 )             39.2     04-02    138  |  108  |  13  ----------------------------<  96  3.8   |  17<L>  |  0.68    Ca    9.0      02 Apr 2024 05:30  Phos  3.5     04-02  Mg     2.1     04-02        Urinalysis Basic - ( 02 Apr 2024 05:30 )    Color: x / Appearance: x / SG: x / pH: x  Gluc: 96 mg/dL / Ketone: x  / Bili: x / Urobili: x   Blood: x / Protein: x / Nitrite: x   Leuk Esterase: x / RBC: x / WBC x   Sq Epi: x / Non Sq Epi: x / Bacteria: x        RADIOLOGY & ADDITIONAL TESTS:  Reviewed.

## 2024-04-04 NOTE — PROGRESS NOTE ADULT - SUBJECTIVE AND OBJECTIVE BOX
Patient is a 73y old  Male who presents with a chief complaint of Acute Stroke (04 Apr 2024 09:12)      INTERVAL HPI/OVERNIGHT EVENTS:    Pt. seen and examined earlier today  Pt. feels well, has no complaints  Pt. denies F/C, HA, CP, SOB, cough    Review of Systems: 12 point review of systems otherwise negative    MEDICATIONS  (STANDING):  aspirin enteric coated 81 milliGRAM(s) Oral daily  atorvastatin 40 milliGRAM(s) Oral at bedtime  benzonatate 100 milliGRAM(s) Oral every 8 hours  clopidogrel Tablet 75 milliGRAM(s) Oral daily  enoxaparin Injectable 40 milliGRAM(s) SubCutaneous every 24 hours  finasteride 5 milliGRAM(s) Oral daily  lidocaine   4% Patch 1 Patch Transdermal daily  lidocaine   4% Patch 1 Patch Transdermal every 24 hours  melatonin 3 milliGRAM(s) Oral at bedtime  mirtazapine 7.5 milliGRAM(s) Oral at bedtime  polyethylene glycol 3350 17 Gram(s) Oral daily  senna 2 Tablet(s) Oral two times a day  sodium chloride 0.9%. 1000 milliLiter(s) (75 mL/Hr) IV Continuous <Continuous>  tamsulosin 0.4 milliGRAM(s) Oral at bedtime  topiramate 50 milliGRAM(s) Oral every 24 hours    MEDICATIONS  (PRN):  acetaminophen     Tablet .. 650 milliGRAM(s) Oral every 6 hours PRN Temp greater or equal to 38C (100.4F), Mild Pain (1 - 3)  benzocaine/menthol Lozenge 1 Lozenge Oral three times a day PRN Sore Throat  bisacodyl Suppository 10 milliGRAM(s) Rectal daily PRN Constipation  ondansetron Injectable 4 milliGRAM(s) IV Push every 8 hours PRN Nausea and/or Vomiting      Allergies    Allergy Status Unknown    Intolerances          Vital Signs Last 24 Hrs  T(C): 36.7 (04 Apr 2024 13:53), Max: 36.7 (04 Apr 2024 13:53)  T(F): 98.1 (04 Apr 2024 13:53), Max: 98.1 (04 Apr 2024 13:53)  HR: 83 (04 Apr 2024 16:21) (80 - 85)  BP: 100/66 (04 Apr 2024 16:21) (98/66 - 109/72)  BP(mean): 77 (04 Apr 2024 16:21) (74 - 88)  RR: 18 (04 Apr 2024 16:21) (16 - 18)  SpO2: 99% (04 Apr 2024 16:21) (97% - 99%)    Parameters below as of 04 Apr 2024 16:21  Patient On (Oxygen Delivery Method): room air      CAPILLARY BLOOD GLUCOSE          04-03 @ 07:01  -  04-04 @ 07:00  --------------------------------------------------------  IN: 0 mL / OUT: 200 mL / NET: -200 mL    04-04 @ 07:01  -  04-04 @ 16:29  --------------------------------------------------------  IN: 850 mL / OUT: 0 mL / NET: 850 mL        Physical Exam:  (earlier today)  Daily     Daily   General:  well appearing in NAD  HEENT:  MMM  CV:  RRR  Lungs:  CTA B/L  Abdomen:  soft NT ND  Extremities:  no edema B/L LE  Skin:  WWP  Neuro:  AAOx3, no dysarthria  LABS:                        15.2   8.80  )-----------( 286      ( 04 Apr 2024 10:18 )             44.1     04-04    140  |  105  |  14  ----------------------------<  109<H>  4.2   |  23  |  0.79    Ca    9.8      04 Apr 2024 10:18  Phos  3.8     04-04  Mg     2.3     04-04        Urinalysis Basic - ( 04 Apr 2024 10:18 )    Color: x / Appearance: x / SG: x / pH: x  Gluc: 109 mg/dL / Ketone: x  / Bili: x / Urobili: x   Blood: x / Protein: x / Nitrite: x   Leuk Esterase: x / RBC: x / WBC x   Sq Epi: x / Non Sq Epi: x / Bacteria: x

## 2024-04-05 ENCOUNTER — TRANSCRIPTION ENCOUNTER (OUTPATIENT)
Age: 74
End: 2024-04-05

## 2024-04-05 VITALS — SYSTOLIC BLOOD PRESSURE: 94 MMHG | RESPIRATION RATE: 17 BRPM | DIASTOLIC BLOOD PRESSURE: 62 MMHG | HEART RATE: 82 BPM

## 2024-04-05 PROBLEM — Z87.438 PERSONAL HISTORY OF OTHER DISEASES OF MALE GENITAL ORGANS: Chronic | Status: ACTIVE | Noted: 2024-03-24

## 2024-04-05 PROBLEM — Z00.00 ENCOUNTER FOR PREVENTIVE HEALTH EXAMINATION: Status: ACTIVE | Noted: 2024-04-05

## 2024-04-05 PROBLEM — I63.9 CEREBRAL INFARCTION, UNSPECIFIED: Chronic | Status: ACTIVE | Noted: 2024-03-24

## 2024-04-05 PROBLEM — I10 ESSENTIAL (PRIMARY) HYPERTENSION: Chronic | Status: ACTIVE | Noted: 2024-03-24

## 2024-04-05 PROBLEM — E78.5 HYPERLIPIDEMIA, UNSPECIFIED: Chronic | Status: ACTIVE | Noted: 2024-03-24

## 2024-04-05 LAB
ANION GAP SERPL CALC-SCNC: 11 MMOL/L — SIGNIFICANT CHANGE UP (ref 5–17)
BUN SERPL-MCNC: 15 MG/DL — SIGNIFICANT CHANGE UP (ref 7–23)
CALCIUM SERPL-MCNC: 9.4 MG/DL — SIGNIFICANT CHANGE UP (ref 8.4–10.5)
CHLORIDE SERPL-SCNC: 109 MMOL/L — HIGH (ref 96–108)
CO2 SERPL-SCNC: 21 MMOL/L — LOW (ref 22–31)
CREAT SERPL-MCNC: 0.73 MG/DL — SIGNIFICANT CHANGE UP (ref 0.5–1.3)
EGFR: 97 ML/MIN/1.73M2 — SIGNIFICANT CHANGE UP
GLUCOSE SERPL-MCNC: 99 MG/DL — SIGNIFICANT CHANGE UP (ref 70–99)
HCT VFR BLD CALC: 38.1 % — LOW (ref 39–50)
HGB BLD-MCNC: 13.5 G/DL — SIGNIFICANT CHANGE UP (ref 13–17)
MAGNESIUM SERPL-MCNC: 2.4 MG/DL — SIGNIFICANT CHANGE UP (ref 1.6–2.6)
MCHC RBC-ENTMCNC: 33.4 PG — SIGNIFICANT CHANGE UP (ref 27–34)
MCHC RBC-ENTMCNC: 35.4 GM/DL — SIGNIFICANT CHANGE UP (ref 32–36)
MCV RBC AUTO: 94.3 FL — SIGNIFICANT CHANGE UP (ref 80–100)
NRBC # BLD: 0 /100 WBCS — SIGNIFICANT CHANGE UP (ref 0–0)
PHOSPHATE SERPL-MCNC: 4 MG/DL — SIGNIFICANT CHANGE UP (ref 2.5–4.5)
PLATELET # BLD AUTO: 262 K/UL — SIGNIFICANT CHANGE UP (ref 150–400)
POTASSIUM SERPL-MCNC: 3.7 MMOL/L — SIGNIFICANT CHANGE UP (ref 3.5–5.3)
POTASSIUM SERPL-SCNC: 3.7 MMOL/L — SIGNIFICANT CHANGE UP (ref 3.5–5.3)
RBC # BLD: 4.04 M/UL — LOW (ref 4.2–5.8)
RBC # FLD: 12.4 % — SIGNIFICANT CHANGE UP (ref 10.3–14.5)
SODIUM SERPL-SCNC: 141 MMOL/L — SIGNIFICANT CHANGE UP (ref 135–145)
WBC # BLD: 7.71 K/UL — SIGNIFICANT CHANGE UP (ref 3.8–10.5)
WBC # FLD AUTO: 7.71 K/UL — SIGNIFICANT CHANGE UP (ref 3.8–10.5)

## 2024-04-05 PROCEDURE — 97116 GAIT TRAINING THERAPY: CPT

## 2024-04-05 PROCEDURE — 37195 THROMBOLYTIC THERAPY STROKE: CPT

## 2024-04-05 PROCEDURE — 99291 CRITICAL CARE FIRST HOUR: CPT

## 2024-04-05 PROCEDURE — 97110 THERAPEUTIC EXERCISES: CPT

## 2024-04-05 PROCEDURE — 81003 URINALYSIS AUTO W/O SCOPE: CPT

## 2024-04-05 PROCEDURE — 97163 PT EVAL HIGH COMPLEX 45 MIN: CPT

## 2024-04-05 PROCEDURE — 85610 PROTHROMBIN TIME: CPT

## 2024-04-05 PROCEDURE — 99232 SBSQ HOSP IP/OBS MODERATE 35: CPT

## 2024-04-05 PROCEDURE — 70498 CT ANGIOGRAPHY NECK: CPT | Mod: MC

## 2024-04-05 PROCEDURE — 97164 PT RE-EVAL EST PLAN CARE: CPT

## 2024-04-05 PROCEDURE — 84100 ASSAY OF PHOSPHORUS: CPT

## 2024-04-05 PROCEDURE — ZZZZZ: CPT

## 2024-04-05 PROCEDURE — 83735 ASSAY OF MAGNESIUM: CPT

## 2024-04-05 PROCEDURE — 80053 COMPREHEN METABOLIC PANEL: CPT

## 2024-04-05 PROCEDURE — 71045 X-RAY EXAM CHEST 1 VIEW: CPT

## 2024-04-05 PROCEDURE — 81001 URINALYSIS AUTO W/SCOPE: CPT

## 2024-04-05 PROCEDURE — 70450 CT HEAD/BRAIN W/O DYE: CPT | Mod: MC

## 2024-04-05 PROCEDURE — 86146 BETA-2 GLYCOPROTEIN ANTIBODY: CPT

## 2024-04-05 PROCEDURE — 86147 CARDIOLIPIN ANTIBODY EA IG: CPT

## 2024-04-05 PROCEDURE — C1757: CPT

## 2024-04-05 PROCEDURE — C1769: CPT

## 2024-04-05 PROCEDURE — 93312 ECHO TRANSESOPHAGEAL: CPT

## 2024-04-05 PROCEDURE — 0042T: CPT | Mod: MC

## 2024-04-05 PROCEDURE — 99238 HOSP IP/OBS DSCHRG MGMT 30/<: CPT

## 2024-04-05 PROCEDURE — 85027 COMPLETE CBC AUTOMATED: CPT

## 2024-04-05 PROCEDURE — 33285 INSJ SUBQ CAR RHYTHM MNTR: CPT

## 2024-04-05 PROCEDURE — 36415 COLL VENOUS BLD VENIPUNCTURE: CPT

## 2024-04-05 PROCEDURE — C1894: CPT

## 2024-04-05 PROCEDURE — 0225U NFCT DS DNA&RNA 21 SARSCOV2: CPT

## 2024-04-05 PROCEDURE — C1889: CPT

## 2024-04-05 PROCEDURE — 80048 BASIC METABOLIC PNL TOTAL CA: CPT

## 2024-04-05 PROCEDURE — 82962 GLUCOSE BLOOD TEST: CPT

## 2024-04-05 PROCEDURE — 97165 OT EVAL LOW COMPLEX 30 MIN: CPT

## 2024-04-05 PROCEDURE — 85613 RUSSELL VIPER VENOM DILUTED: CPT

## 2024-04-05 PROCEDURE — 97112 NEUROMUSCULAR REEDUCATION: CPT

## 2024-04-05 PROCEDURE — 85598 HEXAGNAL PHOSPH PLTLT NEUTRL: CPT

## 2024-04-05 PROCEDURE — 97535 SELF CARE MNGMENT TRAINING: CPT

## 2024-04-05 PROCEDURE — 87635 SARS-COV-2 COVID-19 AMP PRB: CPT

## 2024-04-05 PROCEDURE — 93306 TTE W/DOPPLER COMPLETE: CPT

## 2024-04-05 PROCEDURE — 80061 LIPID PANEL: CPT

## 2024-04-05 PROCEDURE — 85730 THROMBOPLASTIN TIME PARTIAL: CPT

## 2024-04-05 PROCEDURE — C1764: CPT

## 2024-04-05 PROCEDURE — C1887: CPT

## 2024-04-05 PROCEDURE — C1760: CPT

## 2024-04-05 PROCEDURE — 83036 HEMOGLOBIN GLYCOSYLATED A1C: CPT

## 2024-04-05 PROCEDURE — 86803 HEPATITIS C AB TEST: CPT

## 2024-04-05 PROCEDURE — 97530 THERAPEUTIC ACTIVITIES: CPT

## 2024-04-05 PROCEDURE — 70496 CT ANGIOGRAPHY HEAD: CPT | Mod: MC

## 2024-04-05 PROCEDURE — 85025 COMPLETE CBC W/AUTO DIFF WBC: CPT

## 2024-04-05 PROCEDURE — 84443 ASSAY THYROID STIM HORMONE: CPT

## 2024-04-05 RX ORDER — LIDOCAINE 4 G/100G
1 CREAM TOPICAL
Qty: 0 | Refills: 0 | DISCHARGE
Start: 2024-04-05

## 2024-04-05 RX ORDER — ATORVASTATIN CALCIUM 80 MG/1
1 TABLET, FILM COATED ORAL
Refills: 0 | DISCHARGE

## 2024-04-05 RX ORDER — MIRTAZAPINE 45 MG/1
1 TABLET, ORALLY DISINTEGRATING ORAL
Qty: 0 | Refills: 0 | DISCHARGE
Start: 2024-04-05

## 2024-04-05 RX ORDER — ATORVASTATIN CALCIUM 80 MG/1
1 TABLET, FILM COATED ORAL
Qty: 30 | Refills: 0
Start: 2024-04-05 | End: 2024-05-04

## 2024-04-05 RX ORDER — TOPIRAMATE 25 MG
1 TABLET ORAL
Qty: 0 | Refills: 0 | DISCHARGE
Start: 2024-04-05

## 2024-04-05 RX ORDER — ATORVASTATIN CALCIUM 80 MG/1
1 TABLET, FILM COATED ORAL
Qty: 30 | Refills: 0
Start: 2024-04-05

## 2024-04-05 RX ORDER — ASPIRIN/CALCIUM CARB/MAGNESIUM 324 MG
1 TABLET ORAL
Qty: 0 | Refills: 0 | DISCHARGE
Start: 2024-04-05

## 2024-04-05 RX ORDER — CLOPIDOGREL BISULFATE 75 MG/1
1 TABLET, FILM COATED ORAL
Qty: 0 | Refills: 0 | DISCHARGE
Start: 2024-04-05

## 2024-04-05 RX ORDER — LIDOCAINE HYDROCHLORIDE AND EPINEPHRINE 10; 10 MG/ML; UG/ML
20 INJECTION, SOLUTION INFILTRATION; PERINEURAL ONCE
Refills: 0 | Status: DISCONTINUED | OUTPATIENT
Start: 2024-04-05 | End: 2024-04-05

## 2024-04-05 RX ORDER — LISINOPRIL 2.5 MG/1
1 TABLET ORAL
Qty: 0 | Refills: 0 | DISCHARGE

## 2024-04-05 RX ADMIN — Medication 81 MILLIGRAM(S): at 13:18

## 2024-04-05 RX ADMIN — FINASTERIDE 5 MILLIGRAM(S): 5 TABLET, FILM COATED ORAL at 13:18

## 2024-04-05 RX ADMIN — CLOPIDOGREL BISULFATE 75 MILLIGRAM(S): 75 TABLET, FILM COATED ORAL at 13:18

## 2024-04-05 RX ADMIN — Medication 100 MILLIGRAM(S): at 13:18

## 2024-04-05 RX ADMIN — SENNA PLUS 2 TABLET(S): 8.6 TABLET ORAL at 05:35

## 2024-04-05 RX ADMIN — Medication 100 MILLIGRAM(S): at 05:35

## 2024-04-05 NOTE — DISCHARGE NOTE NURSING/CASE MANAGEMENT/SOCIAL WORK - NSFLUVACAGEDISCH_IMM_ALL_CORE
Pt awake and alert, resting comfortably in stretcher. VSS as per flow sheet. IV access attempted by RN, able to draw labs but unable to get PIV. MD aware, as per MD Guerin will wait for labs to result before attempting PIV again. Pt finished bottle at this time and mom breastfeeding. No increased WOB. Safety is maintained
Adult

## 2024-04-05 NOTE — PROGRESS NOTE ADULT - REASON FOR ADMISSION
Acute Stroke

## 2024-04-05 NOTE — PROGRESS NOTE ADULT - PROBLEM SELECTOR PLAN 4
BP goal per Neuro
- c/w statin
BP goal per Neuro
BP goal per Neuro

## 2024-04-05 NOTE — PROGRESS NOTE ADULT - PROBLEM SELECTOR PLAN 1
Pt. c/o L-sided weakness and HA, c/f stroke; Pt. received TNK; CTH reviewed, shows acute R MCA infarct  -- cont. work-up and mgmt per Neuro  -- PT/OT  -- on DAPT + high-intensity statin  -- s/p DEBBIE, possible ILR placement pending COVID + status, will need to time with EP. Continue with telemetry monitoring
Pt. c/o L-sided weakness and HA, c/f stroke; Pt. received TNK; CTH reviewed, shows acute R MCA infarct  -- cont. work-up and mgmt per Neuro  -- PT/OT  -- on DAPT + high-intensity statin  -- s/p DEBBIE, possible ILR placement pending COVID + status, will need to time with EP.
Pt. c/o L-sided weakness and HA, c/f stroke; Pt. received TNK; CTH reviewed, shows acute R MCA infarct  -- cont. work-up and mgmt per Neuro  -- PT/OT  -- on DAPT + high-intensity statin  -- s/p DEBBIE, possible ILR placement pending COVID + status, will need to time with EP. Continue with telemetry monitoring
Pt. c/o L-sided weakness and HA, c/f stroke; Pt. received TNK; CTH reviewed, shows acute R MCA infarct  -- cont. work-up and mgmt per Neuro  -- PT/OT  -- on DAPT + high-intensity statin  -- s/p DEBBIE, possible ILR placement pending COVID + status, will need to time with EP.
Pt. c/o L-sided weakness and HA, c/f stroke; Pt. received TNK; CTH reviewed, shows acute R MCA infarct; no e/o thrombus on DEBBIE  -- cont. work-up and mgmt per Neuro  -- PT/OT  -- on DAPT + high-intensity statin  -- plan for ILR placement, pending completion of COVID-19 isolation period
Pt. c/o L-sided weakness and HA, c/f stroke; Pt. received TNK; CTH reviewed, shows acute R MCA infarct; no e/o thrombus on DEBBIE  -- cont. work-up and mgmt per Neuro  -- PT/OT  -- on DAPT + high-intensity statin  -- plan for ILR placement tomorrow, pending completion of COVID-19 isolation period
Pt. c/o L-sided weakness and HA, c/f stroke; Pt. received TNK; CTH reviewed, shows acute R MCA infarct; no e/o thrombus on DEBBIE; s/p ILR placement today  -- cont. work-up and mgmt per Neuro  -- PT/OT  -- on DAPT + high-intensity statin

## 2024-04-05 NOTE — PROGRESS NOTE ADULT - PROBLEM/PLAN-2
DISPLAY PLAN FREE TEXT
Alert-The patient is alert, awake and responds to voice. The patient is oriented to time, place, and person. The triage nurse is able to obtain subjective information.

## 2024-04-05 NOTE — DISCHARGE NOTE NURSING/CASE MANAGEMENT/SOCIAL WORK - NSDCFUADDAPPT_GEN_ALL_CORE_FT
You will follow up with our stroke NP Alina Lomeli. If you do not receive a call within 1 week to schedule a follow up appointment, please call [phone] to schedule an appointment. Please follow up with your PCP.

## 2024-04-05 NOTE — PROGRESS NOTE ADULT - PROBLEM SELECTOR PROBLEM 5
BPH (benign prostatic hyperplasia)
BPH (benign prostatic hyperplasia)
Hyperlipidemia
BPH (benign prostatic hyperplasia)
BPH (benign prostatic hyperplasia)
Hyperlipidemia
Hyperlipidemia

## 2024-04-05 NOTE — PROGRESS NOTE ADULT - PROBLEM SELECTOR PROBLEM 6
Fever
Fever
BPH (benign prostatic hyperplasia)
Fever
Fever
BPH (benign prostatic hyperplasia)
BPH (benign prostatic hyperplasia)

## 2024-04-05 NOTE — PROGRESS NOTE ADULT - PROVIDER SPECIALTY LIST ADULT
Internal Medicine
Neurology
Electrophysiology
Neurology
Neurology
Neurosurgery
Neurology
Neurosurgery
Internal Medicine
NSICU
Internal Medicine
Internal Medicine
Hospitalist
Hospitalist
Internal Medicine
Internal Medicine

## 2024-04-05 NOTE — DISCHARGE NOTE NURSING/CASE MANAGEMENT/SOCIAL WORK - PATIENT PORTAL LINK FT
You can access the FollowMyHealth Patient Portal offered by [facility] by registering at the following website: [email or web address]. By joining [employer]’s FollowMyHealth portal, you will also be able to view your health information using other applications (apps) compatible with our system.

## 2024-04-05 NOTE — PROGRESS NOTE ADULT - PROBLEM SELECTOR PROBLEM 7
Constipation
Thrombocytopenia
Thrombocytopenia
Constipation
Constipation
Thrombocytopenia
Thrombocytopenia

## 2024-04-05 NOTE — PROGRESS NOTE ADULT - NSPROGADDITIONALINFOA_GEN_ALL_CORE
DVT ppx: LMWH  Dispo: acute rehab

## 2024-04-05 NOTE — PROGRESS NOTE ADULT - PROBLEM SELECTOR PLAN 3
resolved  -- monitor while on DAPT  -- needs outpatient Urology f/u for further work-up
- BP goal per Neurology stroke team
resolved  -- monitor while on DAPT  -- needs outpatient Urology f/u for further work-up
- BP goal per Neurology stroke team
- BP goal per Neurology stroke team
resolved  -- monitor while on DAPT  -- needs outpatient Urology f/u for further work-up
- BP goal per Neurology stroke team

## 2024-04-05 NOTE — PROGRESS NOTE ADULT - SUBJECTIVE AND OBJECTIVE BOX
Patient is a 73y old  Male who presents with a chief complaint of Acute Stroke (05 Apr 2024 12:38)      INTERVAL HPI/OVERNIGHT EVENTS:    Pt. seen and examined earlier today  Pt.'s wife present at bedside  s/p ILR placement, which went well  contact/airborne precautions d/c'ed, per Epidemiology   No fevers    Review of Systems: 12 point review of systems otherwise negative    MEDICATIONS  (STANDING):  aspirin enteric coated 81 milliGRAM(s) Oral daily  atorvastatin 40 milliGRAM(s) Oral at bedtime  benzonatate 100 milliGRAM(s) Oral every 8 hours  clopidogrel Tablet 75 milliGRAM(s) Oral daily  enoxaparin Injectable 40 milliGRAM(s) SubCutaneous every 24 hours  finasteride 5 milliGRAM(s) Oral daily  lidocaine   4% Patch 1 Patch Transdermal daily  lidocaine   4% Patch 1 Patch Transdermal every 24 hours  lidocaine 1%/epinephrine 1:100,000 Inj 20 milliLiter(s) Local Injection once  melatonin 3 milliGRAM(s) Oral at bedtime  mirtazapine 7.5 milliGRAM(s) Oral at bedtime  polyethylene glycol 3350 17 Gram(s) Oral daily  senna 2 Tablet(s) Oral two times a day  sodium chloride 0.9%. 1000 milliLiter(s) (75 mL/Hr) IV Continuous <Continuous>  tamsulosin 0.4 milliGRAM(s) Oral at bedtime  topiramate 50 milliGRAM(s) Oral every 24 hours    MEDICATIONS  (PRN):  acetaminophen     Tablet .. 650 milliGRAM(s) Oral every 6 hours PRN Temp greater or equal to 38C (100.4F), Mild Pain (1 - 3)  benzocaine/menthol Lozenge 1 Lozenge Oral three times a day PRN Sore Throat  bisacodyl Suppository 10 milliGRAM(s) Rectal daily PRN Constipation  ondansetron Injectable 4 milliGRAM(s) IV Push every 8 hours PRN Nausea and/or Vomiting      Allergies    Allergy Status Unknown    Intolerances          Vital Signs Last 24 Hrs  T(C): 36.5 (05 Apr 2024 09:48), Max: 36.7 (04 Apr 2024 13:53)  T(F): 97.7 (05 Apr 2024 09:48), Max: 98.1 (04 Apr 2024 13:53)  HR: 81 (05 Apr 2024 08:30) (81 - 85)  BP: 92/66 (05 Apr 2024 08:30) (92/66 - 104/69)  BP(mean): 75 (05 Apr 2024 08:30) (75 - 80)  RR: 18 (05 Apr 2024 08:30) (18 - 18)  SpO2: 98% (05 Apr 2024 04:55) (98% - 99%)    Parameters below as of 05 Apr 2024 08:30  Patient On (Oxygen Delivery Method): room air      CAPILLARY BLOOD GLUCOSE          04-04 @ 07:01  -  04-05 @ 07:00  --------------------------------------------------------  IN: 850 mL / OUT: 0 mL / NET: 850 mL        Physical Exam:  (earlier today)  Daily     Daily   General:  well appearing in NAD, sitting in a chair  HEENT:  MMM  Chest:  ILR dressing C/D/I    LABS:                        13.5   7.71  )-----------( 262      ( 05 Apr 2024 05:30 )             38.1     04-05    141  |  109<H>  |  15  ----------------------------<  99  3.7   |  21<L>  |  0.73    Ca    9.4      05 Apr 2024 05:30  Phos  4.0     04-05  Mg     2.4     04-05        Urinalysis Basic - ( 05 Apr 2024 05:30 )    Color: x / Appearance: x / SG: x / pH: x  Gluc: 99 mg/dL / Ketone: x  / Bili: x / Urobili: x   Blood: x / Protein: x / Nitrite: x   Leuk Esterase: x / RBC: x / WBC x   Sq Epi: x / Non Sq Epi: x / Bacteria: x

## 2024-04-05 NOTE — PROGRESS NOTE ADULT - SUBJECTIVE AND OBJECTIVE BOX
Procedure- ILR implant  Location- Bedside, [facility]    The rationale for insertion of an implantable loop recorder was discussed with the patient in detail.  The risks of infection, bleeding, pocket hematoma and pain were discussed.  Vendor presence for technical support was also discussed.   Alternatives were reviewed and all questions were answered.  The patient agrees to proceed, and informed consent was signed and placed in the chart.  The patient was in a non-fasting state.    Cloroprep was used to clean procedure site (L chest) and patient was draped in a sterile manner.  The procedure was performed under local anesthetic only.  The site was infiltrated with 1% Lidocaine with 0.001% Epi.  A small incision was made with a specialized scalpel in the region of the fourth intercostal space along the left sternal border.  Using the ILR insertion tool the ILR was “injected” in the subcutaneous plane and deployed (please see CCW report for model number) in the region of optimal R-wave sensing.  Hemostasis was achieved and the wound was closed in a running fashion using 4-0 vircyl suture.  Skin was closed using Dermabond.  Tegaderm was placed over incision.    Complications- None.       Plan of Care-   -	Patient to keep Tegaderm on for 24 hours and then should be removed by the patient.    -	Patient should follow up with EP in 4-6 weeks (call [phone] for an appointment)

## 2024-04-05 NOTE — PROGRESS NOTE ADULT - TIME BILLING
direct patient encounter  reviewed labs and images  documentation  d/w Stroke ACP
None
direct patient encounter  reviewed labs and images  documentation  d/w Stroke ACP
direct patient encounter  reviewed labs and images  documentation  d/w Stroke ACP

## 2024-04-05 NOTE — PROGRESS NOTE ADULT - PROBLEM SELECTOR PLAN 8
likely due to COVID-19; since resolved  -- monitor CBC

## 2024-04-05 NOTE — PROGRESS NOTE ADULT - PROBLEM SELECTOR PLAN 6
2/2 covid19. resolved.
2/2 covid19. resolved.
chronic  -- cont. Flomax + Proscar
chronic  -- cont. Flomax + Proscar
2/2 covid19. resolved.
2/2 covid19. resolved.
chronic  -- cont. Flomax + Proscar

## 2024-04-16 DIAGNOSIS — N40.0 BENIGN PROSTATIC HYPERPLASIA WITHOUT LOWER URINARY TRACT SYMPTOMS: ICD-10-CM

## 2024-04-16 DIAGNOSIS — R29.717 NIHSS SCORE 17: ICD-10-CM

## 2024-04-16 DIAGNOSIS — R47.81 SLURRED SPEECH: ICD-10-CM

## 2024-04-16 DIAGNOSIS — R31.9 HEMATURIA, UNSPECIFIED: ICD-10-CM

## 2024-04-16 DIAGNOSIS — E11.9 TYPE 2 DIABETES MELLITUS WITHOUT COMPLICATIONS: ICD-10-CM

## 2024-04-16 DIAGNOSIS — G81.94 HEMIPLEGIA, UNSPECIFIED AFFECTING LEFT NONDOMINANT SIDE: ICD-10-CM

## 2024-04-16 DIAGNOSIS — D69.6 THROMBOCYTOPENIA, UNSPECIFIED: ICD-10-CM

## 2024-04-16 DIAGNOSIS — I63.311 CEREBRAL INFARCTION DUE TO THROMBOSIS OF RIGHT MIDDLE CEREBRAL ARTERY: ICD-10-CM

## 2024-04-16 DIAGNOSIS — E78.5 HYPERLIPIDEMIA, UNSPECIFIED: ICD-10-CM

## 2024-04-16 DIAGNOSIS — I63.9 CEREBRAL INFARCTION, UNSPECIFIED: ICD-10-CM

## 2024-04-16 DIAGNOSIS — R47.1 DYSARTHRIA AND ANARTHRIA: ICD-10-CM

## 2024-04-16 DIAGNOSIS — U07.1 COVID-19: ICD-10-CM

## 2024-04-16 DIAGNOSIS — I10 ESSENTIAL (PRIMARY) HYPERTENSION: ICD-10-CM

## 2024-04-16 DIAGNOSIS — J98.11 ATELECTASIS: ICD-10-CM

## 2024-04-17 ENCOUNTER — APPOINTMENT (OUTPATIENT)
Dept: NEUROLOGY | Facility: CLINIC | Age: 74
End: 2024-04-17
Payer: COMMERCIAL

## 2024-04-17 ENCOUNTER — APPOINTMENT (OUTPATIENT)
Dept: HEART AND VASCULAR | Facility: CLINIC | Age: 74
End: 2024-04-17
Payer: COMMERCIAL

## 2024-04-17 VITALS
TEMPERATURE: 93.56 F | WEIGHT: 133 LBS | HEIGHT: 70 IN | HEART RATE: 77 BPM | DIASTOLIC BLOOD PRESSURE: 70 MMHG | BODY MASS INDEX: 19.04 KG/M2 | OXYGEN SATURATION: 96 % | SYSTOLIC BLOOD PRESSURE: 97 MMHG

## 2024-04-17 VITALS
SYSTOLIC BLOOD PRESSURE: 105 MMHG | HEIGHT: 70 IN | DIASTOLIC BLOOD PRESSURE: 76 MMHG | WEIGHT: 133 LBS | BODY MASS INDEX: 19.04 KG/M2 | HEART RATE: 71 BPM

## 2024-04-17 DIAGNOSIS — Z95.818 PRESENCE OF OTHER CARDIAC IMPLANTS AND GRAFTS: ICD-10-CM

## 2024-04-17 DIAGNOSIS — R49.0 DYSPHONIA: ICD-10-CM

## 2024-04-17 DIAGNOSIS — Z86.73 PERSONAL HISTORY OF TRANSIENT ISCHEMIC ATTACK (TIA), AND CEREBRAL INFARCTION W/OUT RESIDUAL DEFICITS: ICD-10-CM

## 2024-04-17 DIAGNOSIS — R06.83 SNORING: ICD-10-CM

## 2024-04-17 PROCEDURE — 99212 OFFICE O/P EST SF 10 MIN: CPT | Mod: 25

## 2024-04-17 PROCEDURE — 99205 OFFICE O/P NEW HI 60 MIN: CPT

## 2024-04-17 PROCEDURE — 93285 PRGRMG DEV EVAL SCRMS IP: CPT

## 2024-04-17 RX ORDER — ELECTROLYTES/DEXTROSE
SOLUTION, ORAL ORAL
Refills: 0 | Status: ACTIVE | COMMUNITY

## 2024-04-17 RX ORDER — TAMSULOSIN HYDROCHLORIDE 0.4 MG/1
0.4 CAPSULE ORAL
Refills: 0 | Status: ACTIVE | COMMUNITY

## 2024-04-17 RX ORDER — ATORVASTATIN CALCIUM 40 MG/1
40 TABLET, FILM COATED ORAL DAILY
Refills: 0 | Status: ACTIVE | COMMUNITY

## 2024-04-17 RX ORDER — ASPIRIN 81 MG/1
81 TABLET ORAL DAILY
Refills: 0 | Status: ACTIVE | COMMUNITY

## 2024-04-17 RX ORDER — FINASTERIDE 5 MG/1
5 TABLET, FILM COATED ORAL DAILY
Refills: 0 | Status: ACTIVE | COMMUNITY

## 2024-04-17 NOTE — PHYSICAL EXAM
[FreeTextEntry1] : Alert. Fully oriented. Speech and language are intact. No dysarthria but dysphonia noted. Cranial nerves II-XII are intact. No nystagmus. Mild L NlFF. Motor exam reveals intact strength with individual muscle testing in bilateral upper and lower extremities. No drift. Tone is normal. Sensation is intact to light touch in distal extremities. Finger-to-nose is intact. No satelliting. Rapid alternating movements are normal in the upper and lower extremities. Gait is normal. Mild difficulty with tandem walk.

## 2024-04-17 NOTE — ASSESSMENT
[FreeTextEntry1] : Nilson Rincon is a 73 year old male with PMH of HTN, BPH, HLD who presented to [facility] with left sided weakness with HCT showing R M1 MCA s/p TNK, CTA with R M1 occlusion s/p thrombectomy with TICI 2B c/b COVID now presents for post hospitalization follow up.  Plan: -Continue Aspirin 81 mg daily -Continue Atorvastatin 40 mg, LDL goal <70 -F/u with EP for ILR interrogation -ENT referral for dysphonia -Sleep medicine referral to r/o MARIE -Continue PT/OT/SLP as tolerated -Encouraged to stop all alcohol intake or at least, significantly decrease to maximum of 1 drink per week -Continue aggressive vascular risk factor control with PCP, BP goal <130/80 -Counselled on healthy eating (DASH/Mediterranean diet, limiting red meats and fried foods) -Counselled on importance of regular exercise and remaining active -Counselled on f/u with PCP regarding regular health maintenance and prevention, including routine screening -Counselled on signs of stroke BEFAST and to call 911 with any new or worsening neurological symptoms -RTO in 3 months, will recheck stroke labs at that point including Lipoprotein A

## 2024-04-17 NOTE — REASON FOR VISIT
[Follow-up Device Check] : is here today for a follow-up device check visit for [Other ___] : [unfilled] [Post Hospitalization] : a post hospitalization visit

## 2024-04-17 NOTE — END OF VISIT
Advance Care Planning   Healthcare Decision Maker:    Primary Decision Maker: Ethan Lam Spouse - [phone]    Arnulfo Leal.
[Time Spent: ___ minutes] : I have spent [unfilled] minutes of time on the encounter.

## 2024-04-17 NOTE — HISTORY OF PRESENT ILLNESS
[FreeTextEntry1] : Nilson Rincon is a 73 year old male with PMH of HTN, BPH, HLD who presented to [facility] with left sided weakness with HCT showing R M1 MCA s/p TNK, CTA with R M1 occlusion s/p thrombectomy with TICI 2B c/b COVID now presents for post hospitalization follow up.  Hospital Course 3/24/24-4/5/24: HCT: No gross acute intracranial hemorrhage or mass effect. Hyperdense right M1 MCA, concerning for thrombosis. CTP: Small core infarct, large ischemic penumbra in the right MCA territory. CTA COW: Abrupt right M1 occlusion with MR reconstitution.  CTA Neck: Patent ECAs and ICAs, no hemodynamically significant stenosis at ICA origins. Bilateral vertebral arteries are patent without flow limiting stenosis. TTE: EF 70-75%. Aortic sclerosis without significant stenosis. No PFO or pericardial effusion. DEBBIE: Mild aortic regurgitation. An annuloplasty ring is noted in the mitral position. The mean transvalvular gradient is 2 mmHg at a heart rate of 68 bpm. There is trace mitral regurgitation. No LA thrombus. S/p ILR placement on 4/5/24 Discharge lab work includes A1c 5.5%, TSH 0.603 and LDL 59. Discharge medications include Aspirin, Plavix and Topiramate 50 mg.   Since discharge, patient reports no new stroke-like symptoms. He is tolerating his Aspirin and Atorvastatin without bleeding, bruising or myalgias. BP today 97/70. He follows a regular diet and has been walking almost daily. He does reports snoring but has never had a sleep study completed. He denies any family history of stroke or blood clots. He is scheduled to follow up with EP for ILR monitoring. He was recently discharged from [facility] and continues to get PT/OT/SLP weekly. He reports improve in his appetite but does report some mind brain fog. He endorses drinking 1-2 beers about 4 days a week prior to the stroke. He is interested in seeing ENT to assess his vocal cords. He denies any headaches and has stopped the Topiramate since discharge. He is currently on leave as a [place] at [employer] [place] He endorses some issues with his balance but denies any falls.

## 2024-04-18 ENCOUNTER — APPOINTMENT (OUTPATIENT)
Dept: HEART AND VASCULAR | Facility: CLINIC | Age: 74
End: 2024-04-18

## 2024-04-23 NOTE — END OF VISIT
[Time Spent: ___ minutes] : I have spent [unfilled] minutes of time on the encounter. [FreeTextEntry3] : I, Rachel Hooker, am scribing for (in the presence of) Dr. Rodney the following sections: HPI, PMH,Family/social history, ROS, Physical Exam, Assessment / Plan.   I, Reddy Rodney, personally performed the services described in the documentation, reviewed the documentation recorded by the scribe in my presence and it accurately and completely records my words and actions.

## 2024-04-23 NOTE — PHYSICAL EXAM
[General Appearance - Well Developed] : well developed [Normal Appearance] : normal appearance [Well Groomed] : well groomed [General Appearance - Well Nourished] : well nourished [No Deformities] : no deformities [General Appearance - In No Acute Distress] : no acute distress [Clean] : clean [Dry] : dry [Well-Healed] : well-healed [FreeTextEntry1] : L anterior chest

## 2024-04-23 NOTE — HISTORY OF PRESENT ILLNESS
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   The patient offers no device related complaints. He feels well. He denies pain over implant site, swelling, bleeding, exudate and redness. Notes ecchymosis. He has the bedside monitor but transmissions are being sent to [place].   Hospital Course 3/24/24-4/5/24: HCT: No gross acute intracranial hemorrhage or mass effect. Hyperdense right M1 MCA, concerning for thrombosis. CTP: Small core infarct, large ischemic penumbra in the right MCA territory. CTA COW: Abrupt right M1 occlusion with MR reconstitution.  CTA Neck: Patent ECAs and ICAs, no hemodynamically significant stenosis at ICA origins. Bilateral vertebral arteries are patent without flow limiting stenosis. TTE: EF 70-75%. Aortic sclerosis without significant stenosis. No PFO or pericardial effusion. DEBBIE: Mild aortic regurgitation. An annuloplasty ring is noted in the mitral position. The mean transvalvular gradient is 2 mmHg at a heart rate of 68 bpm. There is trace mitral regurgitation. No LA thrombus. S/p ILR placement on 4/5/24 Discharge lab work includes A1c 5.5%, TSH 0.603 and LDL 59. Discharge medications include Aspirin, Plavix and Topiramate 50 mg.

## 2024-07-11 ENCOUNTER — APPOINTMENT (OUTPATIENT)
Dept: NEUROLOGY | Facility: CLINIC | Age: 74
End: 2024-07-11
Payer: COMMERCIAL

## 2024-07-11 ENCOUNTER — TRANSCRIPTION ENCOUNTER (OUTPATIENT)
Age: 74
End: 2024-07-11

## 2024-07-11 ENCOUNTER — NON-APPOINTMENT (OUTPATIENT)
Age: 74
End: 2024-07-11

## 2024-07-11 VITALS
BODY MASS INDEX: 18.9 KG/M2 | HEART RATE: 83 BPM | DIASTOLIC BLOOD PRESSURE: 69 MMHG | SYSTOLIC BLOOD PRESSURE: 102 MMHG | OXYGEN SATURATION: 97 % | WEIGHT: 132 LBS | HEIGHT: 70 IN

## 2024-07-11 DIAGNOSIS — Z86.73 PERSONAL HISTORY OF TRANSIENT ISCHEMIC ATTACK (TIA), AND CEREBRAL INFARCTION W/OUT RESIDUAL DEFICITS: ICD-10-CM

## 2024-07-11 DIAGNOSIS — G47.30 SLEEP APNEA, UNSPECIFIED: ICD-10-CM

## 2024-07-11 DIAGNOSIS — Z95.818 PRESENCE OF OTHER CARDIAC IMPLANTS AND GRAFTS: ICD-10-CM

## 2024-07-11 PROCEDURE — 99215 OFFICE O/P EST HI 40 MIN: CPT

## 2024-09-25 RX ORDER — MIRTAZAPINE 7.5 MG/1
7.5 TABLET, FILM COATED ORAL
Qty: 30 | Refills: 3 | Status: ACTIVE | COMMUNITY
Start: 2024-09-25 | End: 1900-01-01

## 2025-01-21 ENCOUNTER — RX RENEWAL (OUTPATIENT)
Age: 75
End: 2025-01-21